# Patient Record
Sex: FEMALE | Race: WHITE | NOT HISPANIC OR LATINO | Employment: PART TIME | URBAN - METROPOLITAN AREA
[De-identification: names, ages, dates, MRNs, and addresses within clinical notes are randomized per-mention and may not be internally consistent; named-entity substitution may affect disease eponyms.]

---

## 2017-01-24 DIAGNOSIS — K51.919 ULCERATIVE COLITIS WITH COMPLICATION, UNSPECIFIED LOCATION (H): ICD-10-CM

## 2017-01-24 DIAGNOSIS — K51.90 ULCERATIVE COLITIS (H): Primary | ICD-10-CM

## 2017-01-24 NOTE — TELEPHONE ENCOUNTER
sulfaSALAzine ER (AZULFIDINE EN) 500 MG EC tablet      Last Written Prescription Date: 4/13/2016  Last Fill Quantity: 240,  # refills: 4   Last Office Visit with [facility], [facility] or [facility] prescribing provider: 4/15/2016

## 2017-01-25 RX ORDER — SULFASALAZINE 500 MG/1
TABLET, DELAYED RELEASE ORAL
Qty: 240 TABLET | Refills: 3 | Status: SHIPPED | OUTPATIENT
Start: 2017-01-25 | End: 2017-09-02

## 2017-01-25 NOTE — TELEPHONE ENCOUNTER
BK  Routing refill request to provider for review/approval because:  Drug not on the [facility] refill protocol   Please approve if appropriate  Juhi Gonzalez RN

## 2017-02-01 ENCOUNTER — TELEPHONE (OUTPATIENT)
Dept: FAMILY MEDICINE | Facility: CLINIC | Age: 57
End: 2017-02-01

## 2017-04-17 DIAGNOSIS — G89.29 CHRONIC BACK PAIN: ICD-10-CM

## 2017-04-17 DIAGNOSIS — M54.9 CHRONIC BACK PAIN: ICD-10-CM

## 2017-04-18 NOTE — TELEPHONE ENCOUNTER
Left message for patient to call and schedule        cyclobenzaprine (FLEXERIL) 10 MG tablet        Last Written Prescription Date: 4/15/2016  Last Fill Quantity: 30,  # refills: 5   Last Office Visit with [facility], [facility] or [facility] prescribing provider: 4/15/2016

## 2017-04-20 RX ORDER — CYCLOBENZAPRINE HCL 10 MG
TABLET ORAL
Qty: 30 TABLET | Refills: 0 | OUTPATIENT
Start: 2017-04-20

## 2017-06-02 ENCOUNTER — TRANSFERRED RECORDS (OUTPATIENT)
Dept: HEALTH INFORMATION MANAGEMENT | Facility: CLINIC | Age: 57
End: 2017-06-02

## 2017-06-07 ENCOUNTER — TRANSFERRED RECORDS (OUTPATIENT)
Dept: HEALTH INFORMATION MANAGEMENT | Facility: CLINIC | Age: 57
End: 2017-06-07

## 2017-06-13 ENCOUNTER — TRANSFERRED RECORDS (OUTPATIENT)
Dept: HEALTH INFORMATION MANAGEMENT | Facility: CLINIC | Age: 57
End: 2017-06-13

## 2017-06-16 ENCOUNTER — HOSPITAL ENCOUNTER (EMERGENCY)
Facility: CLINIC | Age: 57
Discharge: HOME | End: 2017-06-16
Attending: EMERGENCY MEDICINE | Admitting: EMERGENCY MEDICINE
Payer: COMMERCIAL

## 2017-06-16 VITALS
OXYGEN SATURATION: 98 % | TEMPERATURE: 98.3 F | HEART RATE: 103 BPM | DIASTOLIC BLOOD PRESSURE: 73 MMHG | HEIGHT: 61 IN | RESPIRATION RATE: 20 BRPM | SYSTOLIC BLOOD PRESSURE: 122 MMHG

## 2017-06-16 DIAGNOSIS — R55 NEAR SYNCOPE: ICD-10-CM

## 2017-06-16 DIAGNOSIS — E86.0 DEHYDRATION: ICD-10-CM

## 2017-06-16 LAB
ALBUMIN UR-MCNC: NEGATIVE MG/DL
ANION GAP SERPL CALCULATED.3IONS-SCNC: 12 MMOL/L (ref 3–14)
APPEARANCE UR: CLEAR
BASOPHILS # BLD AUTO: 0 10E9/L (ref 0–0.2)
BASOPHILS NFR BLD AUTO: 0.2 %
BILIRUB UR QL STRIP: NEGATIVE
BUN SERPL-MCNC: 10 MG/DL (ref 7–30)
CALCIUM SERPL-MCNC: 7.8 MG/DL (ref 8.5–10.1)
CHLORIDE SERPL-SCNC: 107 MMOL/L (ref 94–109)
CO2 SERPL-SCNC: 19 MMOL/L (ref 20–32)
COLOR UR AUTO: YELLOW
CREAT SERPL-MCNC: 0.81 MG/DL (ref 0.52–1.04)
DIFFERENTIAL METHOD BLD: ABNORMAL
EOSINOPHIL # BLD AUTO: 0.1 10E9/L (ref 0–0.7)
EOSINOPHIL NFR BLD AUTO: 0.9 %
ERYTHROCYTE [DISTWIDTH] IN BLOOD BY AUTOMATED COUNT: 14.1 % (ref 10–15)
ETHANOL SERPL-MCNC: 0.04 G/DL
GFR SERPL CREATININE-BSD FRML MDRD: 73 ML/MIN/1.7M2
GLUCOSE SERPL-MCNC: 110 MG/DL (ref 70–99)
GLUCOSE UR STRIP-MCNC: NEGATIVE MG/DL
HCT VFR BLD AUTO: 35.8 % (ref 35–47)
HGB BLD-MCNC: 12.1 G/DL (ref 11.7–15.7)
HGB UR QL STRIP: NEGATIVE
IMM GRANULOCYTES # BLD: 0.1 10E9/L (ref 0–0.4)
IMM GRANULOCYTES NFR BLD: 0.5 %
KETONES UR STRIP-MCNC: NEGATIVE MG/DL
LEUKOCYTE ESTERASE UR QL STRIP: NEGATIVE
LYMPHOCYTES # BLD AUTO: 1.5 10E9/L (ref 0.8–5.3)
LYMPHOCYTES NFR BLD AUTO: 13.4 %
MCH RBC QN AUTO: 33.3 PG (ref 26.5–33)
MCHC RBC AUTO-ENTMCNC: 33.8 G/DL (ref 31.5–36.5)
MCV RBC AUTO: 99 FL (ref 78–100)
MONOCYTES # BLD AUTO: 0.6 10E9/L (ref 0–1.3)
MONOCYTES NFR BLD AUTO: 5.7 %
NEUTROPHILS # BLD AUTO: 8.7 10E9/L (ref 1.6–8.3)
NEUTROPHILS NFR BLD AUTO: 79.3 %
NITRATE UR QL: NEGATIVE
NRBC # BLD AUTO: 0 10*3/UL
NRBC BLD AUTO-RTO: 0 /100
PH UR STRIP: 5 PH (ref 5–7)
PLATELET # BLD AUTO: 303 10E9/L (ref 150–450)
POTASSIUM SERPL-SCNC: 3.4 MMOL/L (ref 3.4–5.3)
RBC # BLD AUTO: 3.63 10E12/L (ref 3.8–5.2)
SODIUM SERPL-SCNC: 138 MMOL/L (ref 133–144)
SP GR UR STRIP: 1.01 (ref 1–1.03)
URN SPEC COLLECT METH UR: NORMAL
UROBILINOGEN UR STRIP-ACNC: 0.2 EU/DL (ref 0.2–1)
WBC # BLD AUTO: 10.9 10E9/L (ref 4–11)

## 2017-06-16 PROCEDURE — 93005 ELECTROCARDIOGRAM TRACING: CPT

## 2017-06-16 PROCEDURE — 99284 EMERGENCY DEPT VISIT MOD MDM: CPT | Mod: 25

## 2017-06-16 PROCEDURE — 80048 BASIC METABOLIC PNL TOTAL CA: CPT | Performed by: EMERGENCY MEDICINE

## 2017-06-16 PROCEDURE — 80320 DRUG SCREEN QUANTALCOHOLS: CPT | Performed by: EMERGENCY MEDICINE

## 2017-06-16 PROCEDURE — 81003 URINALYSIS AUTO W/O SCOPE: CPT | Performed by: EMERGENCY MEDICINE

## 2017-06-16 PROCEDURE — 85025 COMPLETE CBC W/AUTO DIFF WBC: CPT | Performed by: EMERGENCY MEDICINE

## 2017-06-16 PROCEDURE — 96360 HYDRATION IV INFUSION INIT: CPT

## 2017-06-16 PROCEDURE — 25000128 H RX IP 250 OP 636: Performed by: EMERGENCY MEDICINE

## 2017-06-16 PROCEDURE — 96361 HYDRATE IV INFUSION ADD-ON: CPT

## 2017-06-16 RX ORDER — SODIUM CHLORIDE 9 MG/ML
1000 INJECTION, SOLUTION INTRAVENOUS CONTINUOUS
Status: DISCONTINUED | OUTPATIENT
Start: 2017-06-16 | End: 2017-06-16 | Stop reason: HOSPADM

## 2017-06-16 RX ADMIN — SODIUM CHLORIDE 1000 ML: 9 INJECTION, SOLUTION INTRAVENOUS at 17:57

## 2017-06-16 RX ADMIN — SODIUM CHLORIDE 1000 ML: 9 INJECTION, SOLUTION INTRAVENOUS at 16:47

## 2017-06-16 NOTE — ED AVS SNAPSHOT
Emergency Department    [address] [place] [address]    Phone:  [phone]    Fax:  [phone]                                       John Steven   MRN: [id number]    Department:   Emergency Department   Date of Visit:  6/16/2017           After Visit Summary Signature Page     I have received my discharge instructions, and my questions have been answered. I have discussed any challenges I see with this plan with the nurse or doctor.    ..........................................................................................................................................  Patient/Patient Representative Signature      ..........................................................................................................................................  Patient Representative Print Name and Relationship to Patient    ..................................................               ................................................  Date                                            Time    ..........................................................................................................................................  Reviewed by Signature/Title    ...................................................              ..............................................  Date                                                            Time

## 2017-06-16 NOTE — ED NOTES
Bed: ED28  Expected date:   Expected time:   Means of arrival:   Comments:  Tawana 414 low BP 57 female

## 2017-06-16 NOTE — ED AVS SNAPSHOT
Emergency Department    [address] [facility] [address]    Phone:  [phone]    Fax:  [phone]                                       John Steven   MRN: [id number]    Department:   Emergency Department   Date of Visit:  6/16/2017           Patient Information     Date Of Birth          1960        Your diagnoses for this visit were:     Near syncope     Dehydration        You were seen by Trierweiler, Chad A, MD.      Follow-up Information     Follow up with Rianna Sim MD In 1 week.    Specialty:  Internal Medicine    Contact information:    [facility]  [address] [facility] [address]  [facility] [id number] [phone]          Follow up with  Emergency Department.    Specialty:  EMERGENCY MEDICINE    Why:  If symptoms worsen    Contact information:    [address] [facility] [address] [phone]        Discharge Instructions       Discharge Instructions  Syncope    Syncope (fainting) is a sudden, short loss of consciousness (passing out spell). People will usually fall to the ground when they faint or slump over if seated.  At this time your doctor does not find that your fainting spell is a sign of anything dangerous or life-threatening.  However, sometimes the signs of serious illness do not show up right away.  If you have new or worse symptoms, you may need to be seen again in the Emergency Department or by your primary doctor. Be sure to follow up as directed, or at least within 1 week.      Return to the Emergency Department if:    You faint again.     You have any significant bleeding.    You have chest pain or fast heartbeat, or if your heartbeat is irregular or skipping beats.    You feel short of breath.    You cough up any blood.    You have belly (abdominal) pain or unusual back pain.    You have ongoing vomiting (throwing up) or diarrhea, or have a black or tarry bowel movement or blood in the bowels or blood in your vomit.    You have a  fever over 101 degrees.    You lose feeling or cannot move a part of your body or cannot talk normally.    You are confused, have a headache, cannot see well, or have a seizure.    DO NOT DRIVE. CALL 911 INSTEAD!    What can I do to help myself?    Follow any specific instructions that your provider discussed with you.    If you feel light-headed, make sure to sit down right away, even if you have to sit on the floor.    Follow up with your regular medical doctor as discussed for further management. This may include lowering your blood pressure medications, insulin or other diabetic medications, checking your blood sugar more frequently, and drinking more fluids, taking medicines for vomiting or diarrhea or getting up slower.  If you were given a prescription for medicine here today, be sure to read all of the information (including the package insert) that comes with your prescription.  This will include important information about the medicine, its side effects, and any warnings that you need to know about.  The pharmacist who fills the prescription can provide more information and answer questions you may have about the medicine.  If you have questions or concerns that the pharmacist cannot address, please call or return to the Emergency Department.     Future Appointments        Provider Department Dept Phone Center    6/23/2017 10:00 AM Rianna Sim MD [facility] [phone]       24 Hour Appointment Hotline       To make an appointment at any [facility], call [phone] ([phone]). If you don't have a family doctor or clinic, we will help you find one. [facility] (formerly [facility]) are conveniently located to serve the needs of you and your family.             Review of your medicines      Our records show that you are taking the medicines listed below. If these are incorrect, please call your family doctor or clinic.        Dose / Directions Last dose taken    acyclovir 5 % cream   Commonly  known as:  ZOVIRAX   Quantity:  5 g        Apply topically 5 times daily Apply to cold sores/fever blisters   Refills:  3        albuterol 108 (90 BASE) MCG/ACT Inhaler   Commonly known as:  PROAIR HFA/PROVENTIL HFA/VENTOLIN HFA   Dose:  2 puff        Inhale 2 puffs into the lungs every 6 hours as needed for shortness of breath / dyspnea or wheezing   Refills:  0        ALPRAZolam 0.25 MG tablet   Commonly known as:  XANAX   Quantity:  45 tablet        TAKE 2 TABLETS BY MOUTH THREE TIMES DAILY AS NEEDED FOR ANXIETY   Refills:  0        cetirizine 10 MG tablet   Commonly known as:  zyrTEC   Dose:  10 mg        Take 10 mg by mouth daily   Refills:  0        clidinium-chlordiazePOXIDE 5-2.5 MG Caps per capsule   Commonly known as:  LIBRAX   Quantity:  30 capsule        TAKE ONE CAPSULE BY MOUTH DAILY AS NEEDED   Refills:  12        cyclobenzaprine 10 MG tablet   Commonly known as:  FLEXERIL   Dose:  5-10 mg   Quantity:  30 tablet        Take 0.5-1 tablets (5-10 mg) by mouth every 8 hours as needed for muscle spasms   Refills:  5        diphenhydrAMINE 25 MG capsule   Commonly known as:  BENADRYL   Dose:  25 mg        Take 1 capsule by mouth nightly as needed for itching. Post nasal drip causing esophageal spasms   Refills:  0        folic acid 1 MG tablet   Commonly known as:  FOLVITE   Quantity:  90 tablet        TAKE 1 TABLET BY MOUTH EVERY DAY.   Refills:  3        LEVSIN/SL 0.125 MG sublingual tablet   Quantity:  30   Generic drug:  hyoscyamine        1 TAB 4 TIMES DAILY, BEFORE MEALS AND AT BEDTIME   Refills:  12        MACRODANTIN 100 MG capsule   Generic drug:  nitrofurantoin macrocrystal        1 CAPSULE AFTER SEX   Refills:  0        metroNIDAZOLE 1 % gel   Commonly known as:  METROGEL   Quantity:  30 g        Apply  topically 2 times daily.   Refills:  12        NIFEdipine ER osmotic 30 MG 24 hr tablet   Commonly known as:  NIFEDICAL XL   Dose:  30 mg   Quantity:  90 tablet        Take 1 tablet (30 mg) by  mouth daily   Refills:  3        Norethin-Eth Estrad-Fe Biphas 1 MG-10 MCG / 10 MCG Tabs   Dose:  1 tablet        Take 1 tablet by mouth daily Lo Loestrin   Refills:  0        * order for DME   Quantity:  1 each        Equipment being ordered:  Compression pump Flexitouch   Refills:  0        * order for DME   Quantity:  2 each        Equipment being ordered: Compression stockings 20-30 mm pressure   Refills:  3        psyllium 0.52 G capsule   Dose:  1 capsule        Take 1 capsule by mouth daily   Refills:  0        sulfaSALAzine  MG EC tablet   Commonly known as:  AZULFIDINE EN   Quantity:  240 tablet        TAKE 4 TABLETS BY MOUTH TWICE DAILY   Refills:  3        * Notice:  This list has 2 medication(s) that are the same as other medications prescribed for you. Read the directions carefully, and ask your doctor or other care provider to review them with you.            Procedures and tests performed during your visit     *UA reflex to Microscopic    Alcohol level blood    Basic metabolic panel    CBC with platelets differential    EKG 12 lead    Peripheral IV catheter      Orders Needing Specimen Collection     None      Pending Results     No orders found from 6/14/2017 to 6/17/2017.            Pending Culture Results     No orders found from 6/14/2017 to 6/17/2017.            Pending Results Instructions     If you had any lab results that were not finalized at the time of your Discharge, you can call the ED Lab Result RN at [phone]. You will be contacted by this team for any positive Lab results or changes in treatment. The nurses are available 7 days a week from 10A to 6:30P.  You can leave a message 24 hours per day and they will return your call.        Test Results From Your Hospital Stay        6/16/2017  4:57 PM      Component Results     Component Value Ref Range & Units Status    WBC 10.9 4.0 - 11.0 10e9/L Final    RBC Count 3.63 (L) 3.8 - 5.2 10e12/L Final    Hemoglobin 12.1 11.7 - 15.7  g/dL Final    Hematocrit 35.8 35.0 - 47.0 % Final    MCV 99 78 - 100 fl Final    MCH 33.3 (H) 26.5 - 33.0 pg Final    MCHC 33.8 31.5 - 36.5 g/dL Final    RDW 14.1 10.0 - 15.0 % Final    Platelet Count 303 150 - 450 10e9/L Final    Diff Method Automated Method  Final    % Neutrophils 79.3 % Final    % Lymphocytes 13.4 % Final    % Monocytes 5.7 % Final    % Eosinophils 0.9 % Final    % Basophils 0.2 % Final    % Immature Granulocytes 0.5 % Final    Nucleated RBCs 0 0 /100 Final    Absolute Neutrophil 8.7 (H) 1.6 - 8.3 10e9/L Final    Absolute Lymphocytes 1.5 0.8 - 5.3 10e9/L Final    Absolute Monocytes 0.6 0.0 - 1.3 10e9/L Final    Absolute Eosinophils 0.1 0.0 - 0.7 10e9/L Final    Absolute Basophils 0.0 0.0 - 0.2 10e9/L Final    Abs Immature Granulocytes 0.1 0 - 0.4 10e9/L Final    Absolute Nucleated RBC 0.0  Final         6/16/2017  5:10 PM      Component Results     Component Value Ref Range & Units Status    Sodium 138 133 - 144 mmol/L Final    Potassium 3.4 3.4 - 5.3 mmol/L Final    Chloride 107 94 - 109 mmol/L Final    Carbon Dioxide 19 (L) 20 - 32 mmol/L Final    Anion Gap 12 3 - 14 mmol/L Final    Glucose 110 (H) 70 - 99 mg/dL Final    Urea Nitrogen 10 7 - 30 mg/dL Final    Creatinine 0.81 0.52 - 1.04 mg/dL Final    GFR Estimate 73 >60 mL/min/1.7m2 Final    Non  GFR Calc    GFR Estimate If Black 89 >60 mL/min/1.7m2 Final    African American GFR Calc    Calcium 7.8 (L) 8.5 - 10.1 mg/dL Final         6/16/2017  5:10 PM      Component Results     Component Value Ref Range & Units Status    Ethanol g/dL 0.04 (H) <0.01 g/dL Final         6/16/2017  5:38 PM      Component Results     Component Value Ref Range & Units Status    Color Urine Yellow  Final    Appearance Urine Clear  Final    Glucose Urine Negative NEG mg/dL Final    Bilirubin Urine Negative NEG Final    Ketones Urine Negative NEG mg/dL Final    Specific Gravity Urine 1.010 1.003 - 1.035 Final    Blood Urine Negative NEG Final    pH  Urine 5.0 5.0 - 7.0 pH Final    Protein Albumin Urine Negative NEG mg/dL Final    Urobilinogen Urine 0.2 0.2 - 1.0 EU/dL Final    Nitrite Urine Negative NEG Final    Leukocyte Esterase Urine Negative NEG Final    Source Midstream Urine  Final                Clinical Quality Measure: Blood Pressure Screening     Your blood pressure was checked while you were in the emergency department today. The last reading we obtained was  BP: 120/63 . Please read the guidelines below about what these numbers mean and what you should do about them.  If your systolic blood pressure (the top number) is less than 120 and your diastolic blood pressure (the bottom number) is less than 80, then your blood pressure is normal. There is nothing more that you need to do about it.  If your systolic blood pressure (the top number) is 120-139 or your diastolic blood pressure (the bottom number) is 80-89, your blood pressure may be higher than it should be. You should have your blood pressure rechecked within a year by a primary care provider.  If your systolic blood pressure (the top number) is 140 or greater or your diastolic blood pressure (the bottom number) is 90 or greater, you may have high blood pressure. High blood pressure is treatable, but if left untreated over time it can put you at risk for heart attack, stroke, or kidney failure. You should have your blood pressure rechecked by a primary care provider within the next 4 weeks.  If your provider in the emergency department today gave you specific instructions to follow-up with your doctor or provider even sooner than that, you should follow that instruction and not wait for up to 4 weeks for your follow-up visit.        Thank you for choosing [facility]       Thank you for choosing [facility] for your care. Our goal is always to provide you with excellent care. Hearing back from our patients is one way we can continue to improve our services. Please take a few minutes to complete the  "written survey that you may receive in the mail after you visit with us. Thank you!        [employer] Information     [employer] lets you send messages to your doctor, view your test results, renew your prescriptions, schedule appointments and more. To sign up, go to [email or web address] . Click on \"Log in\" on the left side of the screen, which will take you to the Welcome page. Then click on \"Sign up Now\" on the right side of the page.     You will be asked to enter the access code listed below, as well as some personal information. Please follow the directions to create your username and password.     Your access code is: [email or web address]  Expires: 2017  7:08 PM     Your access code will  in 90 days. If you need help or a new code, please call your [facility] clinic or [phone].        Care EveryWhere ID     This is your Care EveryWhere ID. This could be used by other organizations to access your [facility] medical records  [id number]        After Visit Summary       This is your record. Keep this with you and show to your community pharmacist(s) and doctor(s) at your next visit.                  "

## 2017-06-17 LAB — INTERPRETATION ECG - MUSE: NORMAL

## 2017-06-17 NOTE — DISCHARGE INSTRUCTIONS
Discharge Instructions  Syncope    Syncope (fainting) is a sudden, short loss of consciousness (passing out spell). People will usually fall to the ground when they faint or slump over if seated.  At this time your doctor does not find that your fainting spell is a sign of anything dangerous or life-threatening.  However, sometimes the signs of serious illness do not show up right away.  If you have new or worse symptoms, you may need to be seen again in the Emergency Department or by your primary doctor. Be sure to follow up as directed, or at least within 1 week.      Return to the Emergency Department if:    You faint again.     You have any significant bleeding.    You have chest pain or fast heartbeat, or if your heartbeat is irregular or skipping beats.    You feel short of breath.    You cough up any blood.    You have belly (abdominal) pain or unusual back pain.    You have ongoing vomiting (throwing up) or diarrhea, or have a black or tarry bowel movement or blood in the bowels or blood in your vomit.    You have a fever over 101 degrees.    You lose feeling or cannot move a part of your body or cannot talk normally.    You are confused, have a headache, cannot see well, or have a seizure.    DO NOT DRIVE. CALL 911 INSTEAD!    What can I do to help myself?    Follow any specific instructions that your provider discussed with you.    If you feel light-headed, make sure to sit down right away, even if you have to sit on the floor.    Follow up with your regular medical doctor as discussed for further management. This may include lowering your blood pressure medications, insulin or other diabetic medications, checking your blood sugar more frequently, and drinking more fluids, taking medicines for vomiting or diarrhea or getting up slower.  If you were given a prescription for medicine here today, be sure to read all of the information (including the package insert) that comes with your prescription.  This  will include important information about the medicine, its side effects, and any warnings that you need to know about.  The pharmacist who fills the prescription can provide more information and answer questions you may have about the medicine.  If you have questions or concerns that the pharmacist cannot address, please call or return to the Emergency Department.

## 2017-06-18 NOTE — ED PROVIDER NOTES
"CHIEF COMPLAINT:  \"I'm dehydrated.\"      HISTORY OF PRESENT ILLNESS:  The patient John Steven is a 57-year-old woman presenting to the Emergency Department from a local bar where the staff felt she was dehydrated.  The patient states that she went to the [employer] game last night and drank alcohol.  She felt very tired when she got home and simply went straight to bed without eating or drinking anything.  She awoke this morning and admits to not drinking anything other than a cup of decaf coffee.  She ate nothing throughout the day.  She then went out to a local bar for drinks with her friends.  She was sitting outside in the sun drinking alcohol when she noted that she became very lightheaded.  She felt overly hot and sweaty.  Her friends state that she looked \"gray.\"  They also felt that she seemed to be somewhat confused when they asked her questions.  After they brought her inside and she cooled down she seemed to improve, but when she started to wax and wane again they called EMS who found her to have a slightly low blood pressure of 102/62 with a heart rate of 110 on the scene.  The patient denies chest pain, headache or other complaints at this juncture.  She notes that she simply felt overly hot and wonders if she is dehydrated.  There has been no vomiting or diarrhea.  She denies palpitations or chest pain.      PAST MEDICAL HISTORY:     1.  Ulcerative colitis.   2.  Anxiety.      MEDICATIONS:     1.  Sulfasalazine.   2.  Xanax.   3.  Folic acid.      ALLERGIES:     1.  Fentanyl.   2.  Morphine.   3.  Penicillin.   4.   Shellfish.     5.  She states that she is \"allergic to alcohol.\"      SOCIAL HISTORY:  The patient is a former smoker.  She denies drinking alcohol regularly.      REVIEW OF SYSTEMS:  Pertinent positives and negatives as above, all other systems reviewed and negative.      PHYSICAL EXAMINATION:   VITAL SIGNS:  Blood pressure 123/77, heart rate 103, respiratory rate 18, temperature 98.3 orally, " satting 97% on room air.   GENERAL:  The patient is a tired-appearing 57-year-old woman who is resting comfortably in the bed.   HEENT:  Pupils are equal, round, reactive to light with extraocular movements intact, no rhinorrhea, moist mucous membranes.   CARDIOVASCULAR:  Regular rate and rhythm, no murmurs, gallops or rubs.   RESPIRATORY:  Lungs clear to auscultation bilaterally without wheezes, rales or rhonchi.   GASTROINTESTINAL:  Positive bowel sounds, abdomen soft, nontender, nondistended.   MUSCULOSKELETAL:  Full range of motion of all extremities without difficulty.   SKIN:  Warm and dry without rash.   NEUROLOGIC:  Nonfocal examination.   PSYCHIATRIC:  Normal affect.      LABORATORY DATA/IMAGIN.  Unremarkable complete blood count.   2.  Unremarkable basic metabolic profile.     3.  Alcohol level 0.04.     4.  Urine clear.   5.  EKG shows a normal sinus rhythm with a rate of 98, normal axis and intervals, no ST elevation, depression or T-wave inversion concerning for ischemia.  This EKG is unchanged from a study    in 2015.  This is a normal EKG.      EMERGENCY DEPARTMENT COURSE AND MEDICAL DECISION MAKING:  The patient had an IV established, and labs were obtained.  Nursing notes were reviewed and agreed with.  The patient received 2 liters of normal saline while in the Emergency Department.      The patient presents to us with near-syncope in the setting of sitting in the sun and dehydration.  She has had nothing to eat or drink all day long, then sat in the sun drinking alcohol.  All of this points toward dehydration and a drop in her blood pressure which was found by Paramedics.  She rebounded very nicely with interventions here.  She was observed over a period of 2 hours during which time she felt completely improved and was comfortable with the plan for discharge home.  With her normal labs and improved vital signs I do feel this is reasonable.  I do not feel this represents an acute  cardiac cause of syncope or any other intracranial or neurologic event.  She was advised to watch her oral intake and to increase this appropriately.  She was otherwise invited back to our facility at any time for worsening of her condition or other emergent concerns.      DIAGNOSES:   1.  Near-syncope.   2.  Dehydration.         CHAD A. TRIERWEILER, MD             D: 2017 00:53   T: 2017 10:06   MT: HANNAH#[id number]      Name:     DELIA VALERO   MRN:      [id number]        Account:      [id number]   :      1960           Visit Date:   2017      Document: [id number]       cc: Rianna Sim MD

## 2017-06-23 ENCOUNTER — TRANSFERRED RECORDS (OUTPATIENT)
Dept: HEALTH INFORMATION MANAGEMENT | Facility: CLINIC | Age: 57
End: 2017-06-23

## 2017-06-23 ENCOUNTER — OFFICE VISIT (OUTPATIENT)
Dept: FAMILY MEDICINE | Facility: CLINIC | Age: 57
End: 2017-06-23
Payer: COMMERCIAL

## 2017-06-23 VITALS
BODY MASS INDEX: 34.55 KG/M2 | HEART RATE: 68 BPM | DIASTOLIC BLOOD PRESSURE: 60 MMHG | RESPIRATION RATE: 18 BRPM | HEIGHT: 61 IN | SYSTOLIC BLOOD PRESSURE: 100 MMHG | WEIGHT: 183 LBS | OXYGEN SATURATION: 98 % | TEMPERATURE: 97 F

## 2017-06-23 DIAGNOSIS — E66.9 NON MORBID OBESITY, UNSPECIFIED OBESITY TYPE: ICD-10-CM

## 2017-06-23 DIAGNOSIS — Z13.6 CARDIOVASCULAR SCREENING; LDL GOAL LESS THAN 160: ICD-10-CM

## 2017-06-23 DIAGNOSIS — K22.9 DISORDER OF ESOPHAGUS: ICD-10-CM

## 2017-06-23 DIAGNOSIS — Z23 NEED FOR VACCINATION: ICD-10-CM

## 2017-06-23 DIAGNOSIS — F43.9 STRESS: ICD-10-CM

## 2017-06-23 DIAGNOSIS — R42 DIZZINESS AND GIDDINESS: ICD-10-CM

## 2017-06-23 DIAGNOSIS — K51.919 ULCERATIVE COLITIS WITH COMPLICATION, UNSPECIFIED LOCATION (H): Primary | ICD-10-CM

## 2017-06-23 PROCEDURE — 99214 OFFICE O/P EST MOD 30 MIN: CPT | Performed by: INTERNAL MEDICINE

## 2017-06-23 PROCEDURE — 90471 IMMUNIZATION ADMIN: CPT | Performed by: INTERNAL MEDICINE

## 2017-06-23 PROCEDURE — 90732 PPSV23 VACC 2 YRS+ SUBQ/IM: CPT | Performed by: INTERNAL MEDICINE

## 2017-06-23 RX ORDER — MESALAMINE 1000 MG/1
SUPPOSITORY RECTAL
Refills: 10 | COMMUNITY
Start: 2017-06-15 | End: 2019-08-09

## 2017-06-23 RX ORDER — HYDROCORTISONE 100 MG/60ML
SUSPENSION RECTAL
Refills: 1 | COMMUNITY
Start: 2017-05-19 | End: 2019-08-09

## 2017-06-23 RX ORDER — NORETHINDRONE ACETATE AND ETHINYL ESTRADIOL, ETHINYL ESTRADIOL AND FERROUS FUMARATE 1MG-10(24)
KIT ORAL
Refills: 3 | COMMUNITY
Start: 2017-06-04 | End: 2018-08-10

## 2017-06-23 RX ORDER — HYDROCORTISONE ACETATE 25 MG
SUPPOSITORY, RECTAL RECTAL
Refills: 1 | COMMUNITY
Start: 2017-06-15 | End: 2019-08-09

## 2017-06-23 RX ORDER — ALPRAZOLAM 0.25 MG
TABLET ORAL
Qty: 45 TABLET | Refills: 1 | Status: SHIPPED | OUTPATIENT
Start: 2017-06-23 | End: 2018-08-10

## 2017-06-23 NOTE — NURSING NOTE
"Chief Complaint   Patient presents with     Physical       Initial /60 (BP Location: Left arm, Patient Position: Chair, Cuff Size: Adult Large)  Pulse 68  Temp 97  F (36.1  C) (Oral)  Resp 18  Ht 5' 1.25\" (1.556 m)  Wt 183 lb (83 kg)  SpO2 98%  Breastfeeding? No  BMI 34.3 kg/m2 Estimated body mass index is 34.3 kg/(m^2) as calculated from the following:    Height as of this encounter: 5' 1.25\" (1.556 m).    Weight as of this encounter: 183 lb (83 kg).  Medication Reconciliation: complete   Briana Rodas CMA (AAMA)      "

## 2017-06-23 NOTE — NURSING NOTE
Screening Questionnaire for Adult Immunization    Are you sick today?   No   Do you have allergies to medications, food, a vaccine component or latex?   No   Have you ever had a serious reaction after receiving a vaccination?   No   Do you have a long-term health problem with heart disease, lung disease, asthma, kidney disease, metabolic disease (e.g. diabetes), anemia, or other blood disorder?   No   Do you have cancer, leukemia, HIV/AIDS, or any other immune system problem?   No   In the past 3 months, have you taken medications that affect  your immune system, such as prednisone, other steroids, or anticancer drugs; drugs for the treatment of rheumatoid arthritis, Crohn s disease, or psoriasis; or have you had radiation treatments?   No   Have you had a seizure, or a brain or other nervous system problem?   No   During the past year, have you received a transfusion of blood or blood     products, or been given immune (gamma) globulin or antiviral drug?   No   For women: Are you pregnant or is there a chance you could become        pregnant during the next month?   No   Have you received any vaccinations in the past 4 weeks?   No     Immunization questionnaire answers were all negative.      MNVFC doesn't apply on this patient    Per orders of Dr. Sim, injection of Pneumovax 23 given by Briana Rodas. Patient instructed to remain in clinic for 20 minutes afterwards, and to report any adverse reaction to me immediately.       Screening performed by Briana Rodas on 6/23/2017 at 10:57 AM.

## 2017-06-23 NOTE — PROGRESS NOTES
SUBJECTIVE:                                                    John Steven is a 57 year old female who presents to clinic today for the following health issues:   SUBJECTIVE:     CC: John Steven is an 57 year old woman who presents for preventive health visit.     Healthy Habits:    Do you get at least three servings of calcium containing foods daily (dairy, green leafy vegetables, etc.)? no, taking calcium and/or vitamin D supplement: no    Amount of exercise or daily activities, outside of work: walking only    Problems taking medications regularly No    Medication side effects: No    Have you had an eye exam in the past two years? yes    Do you see a dentist twice per year? yes    Do you have sleep apnea, excessive snoring or daytime drowsiness?no    Patient is seeing GI for colitis and GERD issues  She sees Dr Messina today    She was on Enterocort and now done with it  It did not help  She also has been on OCP due to issues with Colitis  Her gyn manages that  GI issues are stable    She added extra BCP to help with colitis  No blood in stools with that  She is on Rectal suppository  She has bleeding , urgency, diarrhea with flare up  She feels very stressed         Problem list and histories reviewed & adjusted, as indicated.  Additional history: as documented    Patient Active Problem List   Diagnosis     Other specified disorder of rectum and anus     Ulcerative colitis (H)     Other urethritis(187.89)     Other and unspecified hormones and synthetic substitutes causing adverse effect in therapeutic use     Allergic rhinitis     Disorder of esophagus     Rosacea     Dizziness and giddiness     CARDIOVASCULAR SCREENING; LDL GOAL LESS THAN 160     Obesity     Perimenopausal     Past Surgical History:   Procedure Laterality Date     BIOPSY      BREAST     C NONSPECIFIC PROCEDURE  2003    Heller myotomy     C NONSPECIFIC PROCEDURE      EB for spotting,fibroid     C NONSPECIFIC PROCEDURE  2007    rt breast biopsy      DILATION AND CURETTAGE, HYSTEROSCOPY DIAGNOSTIC, COMBINED N/A 8/24/2015    Procedure: COMBINED DILATION AND CURETTAGE, HYSTEROSCOPY DIAGNOSTIC;  Surgeon: Chelle Rebollar MD;  Location: [facility]     GI SURGERY       GYN SURGERY       HC COLONOSCOPY THRU STOMA, DIAGNOSTIC  4/06       Social History   Substance Use Topics     Smoking status: Former Smoker     Packs/day: 1.00     Years: 10.00     Types: Cigarettes     Quit date: 1/1/1989     Smokeless tobacco: Never Used     Alcohol use 0.0 oz/week     0 Standard drinks or equivalent per week      Comment: 2-3 drinks  times a week     Family History   Problem Relation Age of Onset     CEREBROVASCULAR DISEASE Mother      80     DIABETES Mother      Breast Cancer Mother      60     Blood Disease Mother      merkel cell     GASTROINTESTINAL DISEASE Brother      Collagenous colitis     Asthma Son      DIABETES Maternal Grandmother          Current Outpatient Prescriptions   Medication Sig Dispense Refill     ALPRAZolam (XANAX) 0.25 MG tablet TAKE 2 TABLETS BY MOUTH THREE TIMES DAILY AS NEEDED FOR ANXIETY 45 tablet 1     CANASA 1000 MG Suppository UNW AND I 1 SUP REC QD PRN  10     hydrocortisone (CORTENEMA) 100 MG/60ML enema PLACE 1 ENEMA  BY RECTAL ROUTE QD FOR 2 WEEKS HS.  1     ANUCORT-HC 25 MG Suppository UNW AND I 1 SUP REC  Q EVENING FOR 2 WEEKS  1     CORTIFOAM 10 % rectal foam JESSICA REC BID FOR 14 DAYS  0     LO LOESTRIN FE 1 MG-10 MCG / 10 MCG TABS TK ONE ACTIVE T PO ONCE D  3     sulfaSALAzine ER (AZULFIDINE EN) 500 MG EC tablet TAKE 4 TABLETS BY MOUTH TWICE DAILY 240 tablet 3     order for DME Equipment being ordered: Compression stockings  20-30 mm pressure 2 each 3     clidinium-chlordiazePOXIDE (LIBRAX) 5-2.5 MG CAPS TAKE ONE CAPSULE BY MOUTH DAILY AS NEEDED 30 capsule 12     folic acid (FOLVITE) 1 MG tablet TAKE 1 TABLET BY MOUTH EVERY DAY. 90 tablet 3     cyclobenzaprine (FLEXERIL) 10 MG tablet Take 0.5-1 tablets (5-10 mg) by mouth every 8 hours as  "needed for muscle spasms 30 tablet 5     order for DME Equipment being ordered:  Compression pump  Flexitouch 1 each 0     metroNIDAZOLE (METROGEL) 1 % gel Apply  topically 2 times daily. 30 g 12     psyllium 0.52 G capsule Take 1 capsule by mouth daily       cetirizine (ZYRTEC) 10 MG tablet Take 10 mg by mouth daily       albuterol (PROAIR HFA, PROVENTIL HFA, VENTOLIN HFA) 108 (90 BASE) MCG/ACT inhaler Inhale 2 puffs into the lungs every 6 hours as needed for shortness of breath / dyspnea or wheezing       NIFEdipine osmotic (NIFEDICAL XL) 30 MG 24 hr tablet Take 1 tablet (30 mg) by mouth daily 90 tablet 3     acyclovir (ZOVIRAX) 5 % cream Apply topically 5 times daily Apply to cold sores/fever blisters 5 g 3     diphenhydrAMINE (BENADRYL) 25 MG capsule Take 1 capsule by mouth nightly as needed for itching. Post nasal drip causing esophageal spasms       LEVSIN/SL 0.125 MG SL SUBL 1 TAB 4 TIMES DAILY, BEFORE MEALS AND AT BEDTIME 30 12     MACRODANTIN 100 MG OR CAPS 1 CAPSULE AFTER SEX         Reviewed and updated as needed this visit by clinical staff  Tobacco  Allergies  Meds  Problems  Med Hx  Surg Hx  Fam Hx  Soc Hx        Reviewed and updated as needed this visit by Provider  Allergies  Meds  Problems  Med Hx  Fam Hx         ROS:  Constitutional, HEENT, cardiovascular, pulmonary, GI, , musculoskeletal, neuro, skin, endocrine and psych systems are negative, except as otherwise noted.    OBJECTIVE:                                                    /60 (BP Location: Left arm, Patient Position: Chair, Cuff Size: Adult Large)  Pulse 68  Temp 97  F (36.1  C) (Oral)  Resp 18  Ht 5' 1.25\" (1.556 m)  Wt 183 lb (83 kg)  SpO2 98%  Breastfeeding? No  BMI 34.3 kg/m2  Body mass index is 34.3 kg/(m^2).  GENERAL: healthy, alert and no distress  EYES: Eyes grossly normal to inspection, PERRL and conjunctivae and sclerae normal  HENT: ear canals and TM's normal, nose and mouth without ulcers or " lesions  NECK: no adenopathy, no asymmetry, masses, or scars and thyroid normal to palpation  RESP: lungs clear to auscultation - no rales, rhonchi or wheezes  BREAST: normal without masses, tenderness or nipple discharge and no palpable axillary masses or adenopathy  CV: regular rate and rhythm, normal S1 S2, no S3 or S4, no murmur, click or rub, no peripheral edema and peripheral pulses strong  ABDOMEN: soft, nontender, no hepatosplenomegaly, no masses and bowel sounds normal  MS: no gross musculoskeletal defects noted, no edema  SKIN: no suspicious lesions or rashes  NEURO: Normal strength and tone, mentation intact and speech normal  PSYCH: mentation appears normal, affect normal/bright         ASSESSMENT/PLAN:                                                            1. Ulcerative colitis with complication, unspecified location (H)  Patient will start a biological agent and Pneumovax will be given today  Screening has been done by gastroenterology    - Hepatitis C antibody; Future  Risk of  infection and risk of skin cancer was discussed    2. Non morbid obesity, unspecified obesity type  Patient was on steroids and will start to lose weight    3. Disorder of esophagus  She will see her gastroenterologist today and is symptom-free    4. Dizziness and giddiness  Occasional dizziness could be from Low blood pressure as well and she will increase fluids  - ALPRAZolam (XANAX) 0.25 MG tablet; TAKE 2 TABLETS BY MOUTH THREE TIMES DAILY AS NEEDED FOR ANXIETY  Dispense: 45 tablet; Refill: 1    5. Stress    - ALPRAZolam (XANAX) 0.25 MG tablet; TAKE 2 TABLETS BY MOUTH THREE TIMES DAILY AS NEEDED FOR ANXIETY  Dispense: 45 tablet; Refill: 1    6. CARDIOVASCULAR SCREENING; LDL GOAL LESS THAN 160    - Lipid panel reflex to direct LDL; Future        Today's PHQ-2 Score:   PHQ-2 ( 1999 Pfizer) 6/23/2017 4/15/2016   Q1: Little interest or pleasure in doing things 0 0   Q2: Feeling down, depressed or hopeless 0 0   PHQ-2 Score 0  "0       Abuse: Current or Past(Physical, Sexual or Emotional)- No  Do you feel safe in your environment - Yes    Social History   Substance Use Topics     Smoking status: Former Smoker     Packs/day: 1.00     Years: 10.00     Types: Cigarettes     Quit date: 1/1/1989     Smokeless tobacco: Never Used     Alcohol use 0.0 oz/week     0 Standard drinks or equivalent per week      Comment: 2-3 drinks  times a week     The patient does not drink >3 drinks per day nor >7 drinks per week.    Recent Labs   Lab Test  04/11/12   0937  06/07/10   1123   CHOL  210*  203*   HDL  68  70   LDL  120  118   TRIG  110  76   CHOLHDLRATIO  3.1  2.9           Pertinent mammograms are reviewed under the imaging tab.  History of abnormal Pap smear: NO - age 30- 65 PAP every 3 years recommended    Reviewed and updated as needed this visit by clinical staff  Tobacco  Allergies  Meds  Problems  Med Hx  Surg Hx  Fam Hx  Soc Hx          Reviewed and updated as needed this visit by Provider  Allergies  Meds  Problems  Med Hx  Fam Hx      reports that she quit smoking about 28 years ago. Her smoking use included Cigarettes. She has a 10.00 pack-year smoking history. She has never used smokeless tobacco.    Estimated body mass index is 34.3 kg/(m^2) as calculated from the following:    Height as of this encounter: 5' 1.25\" (1.556 m).    Weight as of this encounter: 183 lb (83 kg).   Weight management plan: Discussed healthy diet and exercise guidelines and patient will follow up in 12 months in clinic to re-evaluate.    Counseling Resources:  ATP IV Guidelines  Pooled Cohorts Equation Calculator  Breast Cancer Risk Calculator  FRAX Risk Assessment  ICSI Preventive Guidelines  Dietary Guidelines for Americans, 2010  USDA's MyPlate  ASA Prophylaxis  Lung CA Screening    Rianna Sim MD  [facility]  "

## 2017-06-23 NOTE — MR AVS SNAPSHOT
"              After Visit Summary   6/23/2017    John Steven    MRN: [id number]           Patient Information     Date Of Birth          1960        Visit Information        Provider Department      6/23/2017 10:00 AM Rianna Sim MD [facility]        Today's Diagnoses     Ulcerative colitis with complication, unspecified location (H)    -  1    Non morbid obesity, unspecified obesity type        Disorder of esophagus        Dizziness and giddiness        Stress        CARDIOVASCULAR SCREENING; LDL GOAL LESS THAN 160           Follow-ups after your visit        Future tests that were ordered for you today     Open Future Orders        Priority Expected Expires Ordered    Hepatitis C antibody Routine 6/23/2017 6/23/2018 6/23/2017    Lipid panel reflex to direct LDL Routine 6/23/2017 6/20/2018 6/23/2017            Who to contact     If you have questions or need follow up information about today's clinic visit or your schedule please contact [facility] directly at [phone].  Normal or non-critical lab and imaging results will be communicated to you by MyChart, letter or phone within 4 business days after the clinic has received the results. If you do not hear from us within 7 days, please contact the clinic through [employer] or phone. If you have a critical or abnormal lab result, we will notify you by phone as soon as possible.  Submit refill requests through [employer] or call your pharmacy and they will forward the refill request to us. Please allow 3 business days for your refill to be completed.          Additional Information About Your Visit        ConisusharGoFormz Information     [employer]us lets you send messages to your doctor, view your test results, renew your prescriptions, schedule appointments and more. To sign up, go to [email or web address] . Click on \"Log in\" on the left side of the screen, which will take you to the Welcome page. Then click on \"Sign up Now\" on the right side of " "the page.     You will be asked to enter the access code listed below, as well as some personal information. Please follow the directions to create your username and password.     Your access code is: [email or web address]  Expires: 2017  7:08 PM     Your access code will  in 90 days. If you need help or a new code, please call your [place] clinic or [phone].        Care EveryWhere ID     This is your Care EveryWhere ID. This could be used by other organizations to access your [place] medical records  [id number]        Your Vitals Were     Pulse Temperature Height Pulse Oximetry Breastfeeding? BMI (Body Mass Index)    109 97.1  F (36.2  C) (Oral) 5' 1.25\" (1.556 m) 96% No 34.3 kg/m2       Blood Pressure from Last 3 Encounters:   17 100/60   17 122/73   04/15/16 109/70    Weight from Last 3 Encounters:   17 183 lb (83 kg)   04/15/16 181 lb (82.1 kg)   08/24/15 183 lb 14.4 oz (83.4 kg)                 Today's Medication Changes          These changes are accurate as of: 17 10:40 AM.  If you have any questions, ask your nurse or doctor.               These medicines have changed or have updated prescriptions.        Dose/Directions    ALPRAZolam 0.25 MG tablet   Commonly known as:  XANAX   This may have changed:  See the new instructions.   Used for:  Dizziness and giddiness, Stress   Changed by:  Rianna Sim MD        TAKE 2 TABLETS BY MOUTH THREE TIMES DAILY AS NEEDED FOR ANXIETY   Quantity:  45 tablet   Refills:  1         Stop taking these medicines if you haven't already. Please contact your care team if you have questions.     Norethin-Eth Estrad-Fe Biphas 1 MG-10 MCG / 10 MCG Tabs   Stopped by:  Rianna Sim MD                Where to get your medicines      Some of these will need a paper prescription and others can be bought over the counter.  Ask your nurse if you have questions.     Bring a paper prescription for each of these medications     ALPRAZolam 0.25 MG tablet    "             Primary Care Provider Office Phone # Fax #    Rianna Sim [phone]       [facility] [address] [facility] [address]  [facility] [address]        Equal Access to Services     MAHESH FISH : Hadii aad ku hadamieo Soomaali, waaxda luqadaha, qaybta kaalmada adeegyada, waxjohn diggsn nallelymerly stallings luann muñoz. So [facility] [phone].    ATENCIÓN: Si habla español, tiene a bonilla disposición servicios gratuitos de asistencia lingüística. Llame al [phone].    We comply with applicable federal civil rights laws and [place] laws. We do not discriminate on the basis of race, color, national origin, age, disability sex, sexual orientation or gender identity.            Thank you!     Thank you for choosing [facility]  for your care. Our goal is always to provide you with excellent care. Hearing back from our patients is one way we can continue to improve our services. Please take a few minutes to complete the written survey that you may receive in the mail after your visit with us. Thank you!             Your Updated Medication List - Protect others around you: Learn how to safely use, store and throw away your medicines at www.disposemymeds.org.          This list is accurate as of: 6/23/17 10:40 AM.  Always use your most recent med list.                   Brand Name Dispense Instructions for use Diagnosis    acyclovir 5 % cream    ZOVIRAX    5 g    Apply topically 5 times daily Apply to cold sores/fever blisters    Herpes simplex without mention of complication       albuterol 108 (90 BASE) MCG/ACT Inhaler    PROAIR HFA/PROVENTIL HFA/VENTOLIN HFA     Inhale 2 puffs into the lungs every 6 hours as needed for shortness of breath / dyspnea or wheezing        ALPRAZolam 0.25 MG tablet    XANAX    45 tablet    TAKE 2 TABLETS BY MOUTH THREE TIMES DAILY AS NEEDED FOR ANXIETY    Dizziness and giddiness, Stress       cetirizine 10 MG tablet    zyrTEC     Take 10 mg by mouth daily         clidinium-chlordiazePOXIDE 5-2.5 MG Caps per capsule    LIBRAX    30 capsule    TAKE ONE CAPSULE BY MOUTH DAILY AS NEEDED    Ulcerative colitis with complication, unspecified ulcerative colitis       cyclobenzaprine 10 MG tablet    FLEXERIL    30 tablet    Take 0.5-1 tablets (5-10 mg) by mouth every 8 hours as needed for muscle spasms    Chronic back pain       diphenhydrAMINE 25 MG capsule    BENADRYL     Take 1 capsule by mouth nightly as needed for itching. Post nasal drip causing esophageal spasms        folic acid 1 MG tablet    FOLVITE    90 tablet    TAKE 1 TABLET BY MOUTH EVERY DAY.    Ulcerative colitis with complication, unspecified ulcerative colitis       LEVSIN/SL 0.125 MG sublingual tablet   Generic drug:  hyoscyamine     30    1 TAB 4 TIMES DAILY, BEFORE MEALS AND AT BEDTIME    Unspecified disorder of esophagus       MACRODANTIN 100 MG capsule   Generic drug:  nitrofurantoin macrocrystal      1 CAPSULE AFTER SEX        metroNIDAZOLE 1 % gel    METROGEL    30 g    Apply  topically 2 times daily.    Rosacea       NIFEdipine ER osmotic 30 MG 24 hr tablet    NIFEDICAL XL    90 tablet    Take 1 tablet (30 mg) by mouth daily    Unspecified disorder of esophagus       * order for DME     1 each    Equipment being ordered:  Compression pump Flexitouch    Edema, unspecified edema       * order for DME     2 each    Equipment being ordered: Compression stockings 20-30 mm pressure    Edema       psyllium 0.52 G capsule      Take 1 capsule by mouth daily        sulfaSALAzine  MG EC tablet    AZULFIDINE EN    240 tablet    TAKE 4 TABLETS BY MOUTH TWICE DAILY    Ulcerative colitis with complication, unspecified location (H)       * Notice:  This list has 2 medication(s) that are the same as other medications prescribed for you. Read the directions carefully, and ask your doctor or other care provider to review them with you.

## 2017-06-30 ENCOUNTER — TELEPHONE (OUTPATIENT)
Dept: FAMILY MEDICINE | Facility: CLINIC | Age: 57
End: 2017-06-30

## 2017-06-30 NOTE — TELEPHONE ENCOUNTER
Reason for Call:  Other     Detailed comments: patient was seen 6/23.  She has low calcium and said  Dr Sim was going to discuss treatment for it, but said they got off on another subject and did not get to it.  She would like to know what to do.       Phone Number Patient can be reached at: Cell number on file:    Telephone Information:   Mobile [phone]     Best Time: any    Can we leave a detailed message on this number? YES  Ok to leave detailed information on voice mail    Call taken on 6/30/2017 at 9:41 AM by Kirsten Domingo

## 2017-06-30 NOTE — TELEPHONE ENCOUNTER
She needs to take Vitamin D 1000 international units daily  And 600 mg b.i.d. calcium in diet and supplements  Calcium is present in   Cheese  2. Yogurt  3. Milk  4. Sardines  5. Dark leafy greens like spinach, kale, turnips, and ildefonso greens  6. Fortified cereals such as Total, Raisin Bran, Corn Flakes (They have a lot of calcium in one serving.)  7. Fortified orange juice  8. Soybeans  9. Fortified soymilk (Not all soymilk is a good source of calcium, so it's best to check the label.)  10. Enriched breads, grains, and waffles

## 2017-06-30 NOTE — TELEPHONE ENCOUNTER
PCP: Please advise on low calcium levels and recommended treatment. See message below.     Ana Zendejas RN

## 2017-06-30 NOTE — TELEPHONE ENCOUNTER
I called patient and spoke with her   Relayed Dr. Sim's message below regarding Vit D 1,000 international units daily  And 600 mg BID calcium in diet and supplements.     Relayed high in calcium foods.     Patient will call back with questions.    Ana Zendejas RN

## 2017-08-05 ENCOUNTER — HEALTH MAINTENANCE LETTER (OUTPATIENT)
Age: 57
End: 2017-08-05

## 2017-08-15 DIAGNOSIS — K21.00 GASTROESOPHAGEAL REFLUX DISEASE WITH ESOPHAGITIS: ICD-10-CM

## 2017-08-15 RX ORDER — NIFEDIPINE 30 MG/1
30 TABLET, EXTENDED RELEASE ORAL DAILY
Qty: 90 TABLET | Refills: 0 | Status: CANCELLED | OUTPATIENT
Start: 2017-08-15

## 2017-08-15 NOTE — TELEPHONE ENCOUNTER
Pt has only been prescribed 30 mg, pharmacy is requesting 60mg    Pended 30mg as well, cancel as appropriate     Pending Prescriptions:                       Disp   Refills    NIFEDICAL XL 60 MG TB24 [Pharmacy Med Nam*30 tab*0            Sig: TAKE 1 TABLET BY MOUTH EVERY DAY    NIFEdipine ER osmotic (NIFEDICAL XL) 30 M*90 tab*0            Sig: Take 1 tablet (30 mg) by mouth daily     30mg       Last Written Prescription Date: 7/26/17  Last Fill Quantity: 90, # refills: 0    Last Office Visit with G, P or [facility] prescribing provider:  6/23/17 Chiquis   Future Office Visit:      BP Readings from Last 3 Encounters:   06/23/17 100/60   06/16/17 122/73   04/15/16 109/70     Lab Results   Component Value Date    ALT 16 04/15/2016     Lab Results   Component Value Date    CHOL 210 04/11/2012     Lab Results   Component Value Date    HDL 68 04/11/2012     Lab Results   Component Value Date     04/11/2012     Lab Results   Component Value Date    TRIG 110 04/11/2012     Lab Results   Component Value Date    CHOLHDLRATIO 3.1 04/11/2012     RT Emely(R)

## 2017-08-16 NOTE — TELEPHONE ENCOUNTER
I called patient and spoke with her.  She does report that for last 2 years she has been getting Nifedical XL 60 mg [id number] THROUGH [facility]    She has placed call to [facility] and is awaiting call back.   She would like refill to go through [facility] as that is who has been prescribing.     If for some reason [facility] comes back to patient and states that she needs to get medication through Dr. Sim, then patient will call clinic back.    Ana Zendejas RN

## 2017-08-18 NOTE — TELEPHONE ENCOUNTER
Sent denial to pharmacy with note that prescription should come from specialist ([facility])  Patient knows to call clinic if [facility] not able to fill prescription for her.     Ana Zendejas RN

## 2017-09-02 DIAGNOSIS — K51.919 ULCERATIVE COLITIS WITH COMPLICATION, UNSPECIFIED LOCATION (H): ICD-10-CM

## 2017-09-02 NOTE — TELEPHONE ENCOUNTER
sulfaSALAzine ER (AZULFIDINE EN) 500 MG EC tablet      Last Written Prescription Date:  1/25/17  Last Fill Quantity: 240,   # refills: 3  Last Office Visit with [facility] or [facility] prescribing provider: 6/23/17  Future Office visit:       Routing refill request to provider for review/approval because:  Drug not on the [facility], [facility] or [facility] refill protocol or controlled substance            Yessica QUINONES(R)

## 2017-09-05 RX ORDER — SULFASALAZINE 500 MG/1
TABLET, DELAYED RELEASE ORAL
Qty: 240 TABLET | Refills: 2 | Status: SHIPPED | OUTPATIENT
Start: 2017-09-05 | End: 2019-05-08

## 2017-10-13 ENCOUNTER — TRANSFERRED RECORDS (OUTPATIENT)
Dept: HEALTH INFORMATION MANAGEMENT | Facility: CLINIC | Age: 57
End: 2017-10-13

## 2017-11-17 ENCOUNTER — TELEPHONE (OUTPATIENT)
Dept: FAMILY MEDICINE | Facility: CLINIC | Age: 57
End: 2017-11-17

## 2017-11-17 NOTE — TELEPHONE ENCOUNTER
"11/17/2017      Patient Communication Preferences indicate  Do not contact  and/or communication by \"Phone\" is not preferred. Call not required per Outreach team.      Outreach ,  Terry Juárez        "

## 2017-11-30 DIAGNOSIS — K51.919 ULCERATIVE COLITIS WITH COMPLICATION, UNSPECIFIED LOCATION (H): ICD-10-CM

## 2017-11-30 NOTE — TELEPHONE ENCOUNTER
Pending Prescriptions:                       Disp   Refills    sulfaSALAzine ER (AZULFIDINE EN) 500 MG E*240 ta*0            Sig: TAKE 4 TABLETS BY MOUTH TWICE DAILY        Last Written Prescription Date:  9/5/17  Last Fill Quantity: 240,   # refills: 2  Last Office Visit: 6/23/17 [facility] Office visit:         Lisbeth Adkins RT(R)

## 2017-12-01 RX ORDER — SULFASALAZINE 500 MG/1
TABLET, DELAYED RELEASE ORAL
Start: 2017-12-01

## 2017-12-13 ENCOUNTER — TRANSFERRED RECORDS (OUTPATIENT)
Dept: HEALTH INFORMATION MANAGEMENT | Facility: CLINIC | Age: 57
End: 2017-12-13

## 2018-04-16 ENCOUNTER — HOSPITAL ENCOUNTER (OUTPATIENT)
Dept: MAMMOGRAPHY | Facility: CLINIC | Age: 58
Discharge: HOME | End: 2018-04-16
Attending: OBSTETRICS & GYNECOLOGY | Admitting: OBSTETRICS & GYNECOLOGY
Payer: COMMERCIAL

## 2018-04-16 DIAGNOSIS — Z12.31 VISIT FOR SCREENING MAMMOGRAM: ICD-10-CM

## 2018-04-16 PROCEDURE — 77067 SCR MAMMO BI INCL CAD: CPT

## 2018-06-07 ENCOUNTER — TRANSFERRED RECORDS (OUTPATIENT)
Dept: HEALTH INFORMATION MANAGEMENT | Facility: CLINIC | Age: 58
End: 2018-06-07

## 2018-07-01 ENCOUNTER — TRANSFERRED RECORDS (OUTPATIENT)
Dept: MULTI SPECIALTY CLINIC | Facility: CLINIC | Age: 58
End: 2018-07-01

## 2018-07-01 LAB — PAP SMEAR - HIM PATIENT REPORTED: NEGATIVE

## 2018-07-06 ENCOUNTER — TRANSFERRED RECORDS (OUTPATIENT)
Dept: HEALTH INFORMATION MANAGEMENT | Facility: CLINIC | Age: 58
End: 2018-07-06

## 2018-07-11 ENCOUNTER — TRANSFERRED RECORDS (OUTPATIENT)
Dept: HEALTH INFORMATION MANAGEMENT | Facility: CLINIC | Age: 58
End: 2018-07-11

## 2018-07-25 ENCOUNTER — TRANSFERRED RECORDS (OUTPATIENT)
Dept: HEALTH INFORMATION MANAGEMENT | Facility: CLINIC | Age: 58
End: 2018-07-25

## 2018-08-08 ENCOUNTER — TRANSFERRED RECORDS (OUTPATIENT)
Dept: HEALTH INFORMATION MANAGEMENT | Facility: CLINIC | Age: 58
End: 2018-08-08

## 2018-08-08 ENCOUNTER — MEDICAL CORRESPONDENCE (OUTPATIENT)
Dept: HEALTH INFORMATION MANAGEMENT | Facility: CLINIC | Age: 58
End: 2018-08-08

## 2018-08-10 ENCOUNTER — TELEPHONE (OUTPATIENT)
Dept: FAMILY MEDICINE | Facility: CLINIC | Age: 58
End: 2018-08-10

## 2018-08-10 ENCOUNTER — OFFICE VISIT (OUTPATIENT)
Dept: FAMILY MEDICINE | Facility: CLINIC | Age: 58
End: 2018-08-10
Payer: COMMERCIAL

## 2018-08-10 VITALS
HEART RATE: 82 BPM | WEIGHT: 183 LBS | DIASTOLIC BLOOD PRESSURE: 61 MMHG | OXYGEN SATURATION: 96 % | SYSTOLIC BLOOD PRESSURE: 99 MMHG | HEIGHT: 62 IN | TEMPERATURE: 98.5 F | BODY MASS INDEX: 33.68 KG/M2

## 2018-08-10 DIAGNOSIS — M54.50 CHRONIC MIDLINE LOW BACK PAIN WITHOUT SCIATICA: ICD-10-CM

## 2018-08-10 DIAGNOSIS — R60.0 LOCALIZED EDEMA: ICD-10-CM

## 2018-08-10 DIAGNOSIS — Z11.59 NEED FOR HEPATITIS C SCREENING TEST: ICD-10-CM

## 2018-08-10 DIAGNOSIS — F43.9 STRESS: ICD-10-CM

## 2018-08-10 DIAGNOSIS — T78.40XS ALLERGIC STATE, SEQUELA: ICD-10-CM

## 2018-08-10 DIAGNOSIS — Z23 NEED FOR PROPHYLACTIC VACCINATION WITH TETANUS-DIPHTHERIA (TD): ICD-10-CM

## 2018-08-10 DIAGNOSIS — L71.9 ROSACEA: ICD-10-CM

## 2018-08-10 DIAGNOSIS — D84.9 IMMUNOCOMPROMISED (H): ICD-10-CM

## 2018-08-10 DIAGNOSIS — G89.29 CHRONIC MIDLINE LOW BACK PAIN WITHOUT SCIATICA: ICD-10-CM

## 2018-08-10 DIAGNOSIS — Z11.4 SCREENING FOR HIV (HUMAN IMMUNODEFICIENCY VIRUS): ICD-10-CM

## 2018-08-10 DIAGNOSIS — R42 DIZZINESS AND GIDDINESS: ICD-10-CM

## 2018-08-10 DIAGNOSIS — K22.9 DISORDER OF ESOPHAGUS: ICD-10-CM

## 2018-08-10 DIAGNOSIS — K51.919 ULCERATIVE COLITIS WITH COMPLICATION, UNSPECIFIED LOCATION (H): ICD-10-CM

## 2018-08-10 DIAGNOSIS — Z00.00 ROUTINE GENERAL MEDICAL EXAMINATION AT A HEALTH CARE FACILITY: Primary | ICD-10-CM

## 2018-08-10 PROCEDURE — 36415 COLL VENOUS BLD VENIPUNCTURE: CPT | Performed by: INTERNAL MEDICINE

## 2018-08-10 PROCEDURE — 99396 PREV VISIT EST AGE 40-64: CPT | Mod: 25 | Performed by: INTERNAL MEDICINE

## 2018-08-10 PROCEDURE — 90714 TD VACC NO PRESV 7 YRS+ IM: CPT | Performed by: INTERNAL MEDICINE

## 2018-08-10 PROCEDURE — 80061 LIPID PANEL: CPT | Performed by: INTERNAL MEDICINE

## 2018-08-10 PROCEDURE — 90471 IMMUNIZATION ADMIN: CPT | Performed by: INTERNAL MEDICINE

## 2018-08-10 PROCEDURE — 86803 HEPATITIS C AB TEST: CPT | Performed by: INTERNAL MEDICINE

## 2018-08-10 PROCEDURE — 87389 HIV-1 AG W/HIV-1&-2 AB AG IA: CPT | Performed by: INTERNAL MEDICINE

## 2018-08-10 RX ORDER — CHLORDIAZEPOXIDE HYDROCHLORIDE AND CLIDINIUM BROMIDE 5; 2.5 MG/1; MG/1
CAPSULE ORAL
Qty: 30 CAPSULE | Refills: 12 | Status: SHIPPED | OUTPATIENT
Start: 2018-08-10 | End: 2018-08-10

## 2018-08-10 RX ORDER — METRONIDAZOLE 10 MG/G
GEL TOPICAL
Qty: 30 G | Refills: 12 | Status: SHIPPED | OUTPATIENT
Start: 2018-08-10

## 2018-08-10 RX ORDER — ERGOCALCIFEROL 1.25 MG/1
CAPSULE, LIQUID FILLED ORAL
Status: ON HOLD | COMMUNITY
Start: 2018-08-06 | End: 2019-05-13

## 2018-08-10 RX ORDER — CYCLOBENZAPRINE HCL 10 MG
TABLET ORAL
Qty: 30 TABLET | Refills: 3 | Status: SHIPPED | OUTPATIENT
Start: 2018-08-10

## 2018-08-10 RX ORDER — INFLIXIMAB 100 MG/10ML
INJECTION, POWDER, LYOPHILIZED, FOR SOLUTION INTRAVENOUS
COMMUNITY
Start: 2018-07-11

## 2018-08-10 RX ORDER — CHLORDIAZEPOXIDE HYDROCHLORIDE AND CLIDINIUM BROMIDE 5; 2.5 MG/1; MG/1
CAPSULE ORAL
Qty: 30 CAPSULE | Refills: 12 | Status: SHIPPED | OUTPATIENT
Start: 2018-08-10 | End: 2018-08-24

## 2018-08-10 RX ORDER — ALPRAZOLAM 0.25 MG
TABLET ORAL
Qty: 45 TABLET | Refills: 1 | Status: SHIPPED | OUTPATIENT
Start: 2018-08-10 | End: 2019-03-13

## 2018-08-10 NOTE — NURSING NOTE
Screening Questionnaire for Adult Immunization    Are you sick today?   No   Do you have allergies to medications, food, a vaccine component or latex?   Yes   Have you ever had a serious reaction after receiving a vaccination?   No   Do you have a long-term health problem with heart disease, lung disease, asthma, kidney disease, metabolic disease (e.g. diabetes), anemia, or other blood disorder?   No   Do you have cancer, leukemia, HIV/AIDS, or any other immune system problem?   Yes   In the past 3 months, have you taken medications that affect  your immune system, such as prednisone, other steroids, or anticancer drugs; drugs for the treatment of rheumatoid arthritis, Crohn s disease, or psoriasis; or have you had radiation treatments?   Yes   Have you had a seizure, or a brain or other nervous system problem?   No   During the past year, have you received a transfusion of blood or blood     products, or been given immune (gamma) globulin or antiviral drug?   No   For women: Are you pregnant or is there a chance you could become        pregnant during the next month?   No   Have you received any vaccinations in the past 4 weeks?   No     Immunization questionnaire was positive for at least one answer.  Notified Dr. Sim.        Per orders of Dr. Sim, injection of TD given by Vonda Rivera. Patient instructed to remain in clinic for 15 minutes afterwards, and to report any adverse reaction to me immediately.       Screening performed by Vonda Rivera on 8/10/2018 at 11:39 AM.

## 2018-08-10 NOTE — NURSING NOTE
Screening Questionnaire for Adult Immunization    Are you sick today?   No   Do you have allergies to medications, food, a vaccine component or latex?   Yes   Have you ever had a serious reaction after receiving a vaccination?   No   Do you have a long-term health problem with heart disease, lung disease, asthma, kidney disease, metabolic disease (e.g. diabetes), anemia, or other blood disorder?   No   Do you have cancer, leukemia, HIV/AIDS, or any other immune system problem?   No   In the past 3 months, have you taken medications that affect  your immune system, such as prednisone, other steroids, or anticancer drugs; drugs for the treatment of rheumatoid arthritis, Crohn s disease, or psoriasis; or have you had radiation treatments?   No   Have you had a seizure, or a brain or other nervous system problem?   No   During the past year, have you received a transfusion of blood or blood     products, or been given immune (gamma) globulin or antiviral drug?   No   For women: Are you pregnant or is there a chance you could become        pregnant during the next month?   No   Have you received any vaccinations in the past 4 weeks?   No     Immunization questionnaire was positive for at least one answer.  Notified Dr. Sim.        Per orders of Dr. Sim, injection of TD given by Vonda Rivera. Patient instructed to remain in clinic for 15 minutes afterwards, and to report any adverse reaction to me immediately.       Screening performed by Vonda Rivera on 8/10/2018 at 1:48 PM.

## 2018-08-10 NOTE — MR AVS SNAPSHOT
After Visit Summary   8/10/2018    John Steven    MRN: [id number]           Patient Information     Date Of Birth          1960        Visit Information        Provider Department      8/10/2018 11:00 AM Rianna Sim MD [facility]        Today's Diagnoses     Routine general medical examination at a health care facility    -  1    Ulcerative colitis with complication, unspecified location (H)        Immunocompromised (H)        Disorder of esophagus        Localized edema        Rosacea        Allergic state, sequela        Chronic midline low back pain without sciatica        Need for hepatitis C screening test        Screening for HIV (human immunodeficiency virus)        Need for prophylactic vaccination with tetanus-diphtheria (TD)        Dizziness and giddiness        Stress          Care Instructions      Preventive Health Recommendations  Female Ages 50 - 64    Yearly exam: See your health care provider every year in order to  o Review health changes.   o Discuss preventive care.    o Review your medicines if your doctor has prescribed any.      Get a Pap test every three years (unless you have an abnormal result and your provider advises testing more often).    If you get Pap tests with HPV test, you only need to test every 5 years, unless you have an abnormal result.     You do not need a Pap test if your uterus was removed (hysterectomy) and you have not had cancer.    You should be tested each year for STDs (sexually transmitted diseases) if you're at risk.     Have a mammogram every 1 to 2 years.    Have a colonoscopy at age 50, or have a yearly FIT test (stool test). These exams screen for colon cancer.      Have a cholesterol test every 5 years, or more often if advised.    Have a diabetes test (fasting glucose) every three years. If you are at risk for diabetes, you should have this test more often.     If you are at risk for osteoporosis (brittle bone disease), think  about having a bone density scan (DEXA).    Shots: Get a flu shot each year. Get a tetanus shot every 10 years.    Nutrition:     Eat at least 5 servings of fruits and vegetables each day.    Eat whole-grain bread, whole-wheat pasta and brown rice instead of white grains and rice.    Get adequate Calcium and Vitamin D.     Lifestyle    Exercise at least 150 minutes a week (30 minutes a day, 5 days a week). This will help you control your weight and prevent disease.    Limit alcohol to one drink per day.    No smoking.     Wear sunscreen to prevent skin cancer.     See your dentist every six months for an exam and cleaning.    See your eye doctor every 1 to 2 years.            Follow-ups after your visit        Who to contact     If you have questions or need follow up information about today's clinic visit or your schedule please contact [facility] directly at [phone].  Normal or non-critical lab and imaging results will be communicated to you by [employer], letter or phone within 4 business days after the clinic has received the results. If you do not hear from us within 7 days, please contact the clinic through [employer] or phone. If you have a critical or abnormal lab result, we will notify you by phone as soon as possible.  Submit refill requests through [employer] or call your pharmacy and they will forward the refill request to us. Please allow 3 business days for your refill to be completed.          Additional Information About Your Visit        [employer] Information     [employer] gives you secure access to your electronic health record. If you see a primary care provider, you can also send messages to your care team and make appointments. If you have questions, please call your primary care clinic.  If you do not have a primary care provider, please call [phone] and they will assist you.        Care EveryWhere ID     This is your Care EveryWhere ID. This could be used by other organizations to  "access your [facility] medical records  [id number]        Your Vitals Were     Pulse Temperature Height Pulse Oximetry Breastfeeding? BMI (Body Mass Index)    82 98.5  F (36.9  C) (Tympanic) 5' 1.8\" (1.57 m) 96% No 33.69 kg/m2       Blood Pressure from Last 3 Encounters:   08/10/18 99/61   06/23/17 100/60   06/16/17 122/73    Weight from Last 3 Encounters:   08/10/18 183 lb (83 kg)   06/23/17 183 lb (83 kg)   04/15/16 181 lb (82.1 kg)              We Performed the Following          ADMIN VACCINE, FIRST     Hepatitis C Screen Reflex to HCV RNA Quant and Genotype     HIV Screening     Lipid panel reflex to direct LDL Non-fasting     TD (ADULT, 7+) PRESERVE FREE          Today's Medication Changes          These changes are accurate as of 8/10/18 11:31 AM.  If you have any questions, ask your nurse or doctor.               Start taking these medicines.        Dose/Directions    order for DME   Used for:  Localized edema   Started by:  Rianna Sim MD        Equipment being ordered: Medium compression stockings  20-30 mm   Quantity:  2 each   Refills:  1         These medicines have changed or have updated prescriptions.        Dose/Directions    * albuterol 108 (90 Base) MCG/ACT inhaler   Commonly known as:  PROAIR HFA/PROVENTIL HFA/VENTOLIN HFA   This may have changed:  Another medication with the same name was added. Make sure you understand how and when to take each.   Changed by:  Rianna Sim MD        Dose:  2 puff   Inhale 2 puffs into the lungs every 6 hours as needed for shortness of breath / dyspnea or wheezing   Refills:  0       * albuterol 108 (90 Base) MCG/ACT Aepb inhaler   Commonly known as:  PROAIR RESPICLICK   This may have changed:  You were already taking a medication with the same name, and this prescription was added. Make sure you understand how and when to take each.   Used for:  Allergic state, sequela   Changed by:  Rianna Sim MD        Dose:  2 puff   Inhale 2 puffs into the lungs " every 6 hours as needed for shortness of breath / dyspnea or wheezing   Quantity:  1 each   Refills:  3       * Notice:  This list has 2 medication(s) that are the same as other medications prescribed for you. Read the directions carefully, and ask your doctor or other care provider to review them with you.         Where to get your medicines      These medications were sent to [email or web address] [address] [address] [place] [facility] [address], [facility] [address]     Phone:  [phone]     albuterol 108 (90 Base) MCG/ACT Aepb inhaler    clidinium-chlordiazePOXIDE 5-2.5 MG Caps per capsule    cyclobenzaprine 10 MG tablet    metroNIDAZOLE 1 % gel         Some of these will need a paper prescription and others can be bought over the counter.  Ask your nurse if you have questions.     Bring a paper prescription for each of these medications     ALPRAZolam 0.25 MG tablet    order for DME                Primary Care Provider Office Phone # Fax #    Rianna Sim [phone] [address] [facility] [address]  [facility] [address]        Equal Access to Services     WANDER FISH AH: Hadii marylu cristina hadamieo Sodominguez, waaxda luqadaha, qaybta kaalmada adestalin, lavon muñzo. So [facility] [place] [phone].    ATENCIÓN: Si habla español, tiene a bonilla disposición servicios gratuitos de asistencia lingüística. Kaydename al [phone].    We comply with applicable federal civil rights laws and [place] laws. We do not discriminate on the basis of race, color, national origin, age, disability, sex, sexual orientation, or gender identity.            Thank you!     Thank you for choosing [facility]  for your care. Our goal is always to provide you with excellent care. Hearing back from our patients is one way we can continue to improve our services. Please take a few minutes to complete the written survey that you may receive in the mail after your  visit with us. Thank you!             Your Updated Medication List - Protect others around you: Learn how to safely use, store and throw away your medicines at www.disposemymeds.org.          This list is accurate as of 8/10/18 11:31 AM.  Always use your most recent med list.                   Brand Name Dispense Instructions for use Diagnosis    acyclovir 5 % cream    ZOVIRAX    5 g    Apply topically 5 times daily Apply to cold sores/fever blisters    Herpes simplex without mention of complication       * albuterol 108 (90 Base) MCG/ACT inhaler    PROAIR HFA/PROVENTIL HFA/VENTOLIN HFA     Inhale 2 puffs into the lungs every 6 hours as needed for shortness of breath / dyspnea or wheezing        * albuterol 108 (90 Base) MCG/ACT Aepb inhaler    PROAIR RESPICLICK    1 each    Inhale 2 puffs into the lungs every 6 hours as needed for shortness of breath / dyspnea or wheezing    Allergic state, sequela       ALPRAZolam 0.25 MG tablet    XANAX    45 tablet    TAKE 2 TABLETS BY MOUTH THREE TIMES DAILY AS NEEDED FOR ANXIETY    Dizziness and giddiness, Stress       ANUCORT-HC 25 MG Suppository   Generic drug:  hydrocortisone      UNW AND I 1 SUP REC  Q EVENING FOR 2 WEEKS        CANASA 1000 MG Suppository   Generic drug:  mesalamine      UNW AND I 1 SUP REC QD PRN        cetirizine 10 MG tablet    zyrTEC     Take 10 mg by mouth daily        clidinium-chlordiazePOXIDE 5-2.5 MG Caps per capsule    LIBRAX    30 capsule    TAKE ONE CAPSULE BY MOUTH DAILY AS NEEDED    Ulcerative colitis with complication, unspecified location (H), Disorder of esophagus       CORTIFOAM 10 % rectal foam   Generic drug:  hydrocortisone      JESSICA REC BID FOR 14 DAYS        cyclobenzaprine 10 MG tablet    FLEXERIL    30 tablet    TAKE 1/2 TO 1 TABLET(5 TO 10 MG) BY MOUTH EVERY 8 HOURS AS NEEDED FOR MUSCLE SPASMS    Chronic midline low back pain without sciatica       diphenhydrAMINE 25 MG capsule    BENADRYL     Take 1 capsule by mouth nightly as  needed for itching. Post nasal drip causing esophageal spasms        folic acid 1 MG tablet    FOLVITE    90 tablet    TAKE 1 TABLET BY MOUTH EVERY DAY.    Ulcerative colitis with complication, unspecified ulcerative colitis       hydrocortisone 100 MG/60ML enema    CORTENEMA     PLACE 1 ENEMA  BY RECTAL ROUTE QD FOR 2 WEEKS HS.        LEVSIN/SL 0.125 MG Subl   Generic drug:  Hyoscyamine Sulfate SL     30    1 TAB 4 TIMES DAILY, BEFORE MEALS AND AT BEDTIME    Unspecified disorder of esophagus       methotrexate 2.5 MG tablet CHEMO           metroNIDAZOLE 1 % gel    METROGEL    30 g    Apply  topically 2 times daily.    Rosacea       NIFEdipine ER osmotic 30 MG 24 hr tablet    NIFEDICAL XL    90 tablet    Take 1 tablet (30 mg) by mouth daily    Gastroesophageal reflux disease with esophagitis       * order for DME     1 each    Equipment being ordered:  Compression pump Flexitouch    Edema, unspecified edema       * order for DME     2 each    Equipment being ordered: Compression stockings 20-30 mm pressure    Edema       order for DME     2 each    Equipment being ordered: Medium compression stockings  20-30 mm    Localized edema       progesterone 200 MG capsule    PROMETRIUM          psyllium 0.52 g capsule      Take 1 capsule by mouth daily        REMICADE 100 MG injection   Generic drug:  inFLIXimab           sulfaSALAzine  MG EC tablet    AZULFIDINE EN    240 tablet    TAKE 4 TABLETS BY MOUTH TWICE DAILY    Ulcerative colitis with complication, unspecified location (H)       vitamin D 49151 UNIT capsule    ERGOCALCIFEROL          * Notice:  This list has 4 medication(s) that are the same as other medications prescribed for you. Read the directions carefully, and ask your doctor or other care provider to review them with you.

## 2018-08-10 NOTE — PROGRESS NOTES
SUBJECTIVE:   CC: John Steven is an 58 year old woman who presents for preventive health visit.     Healthy Habits:     Do you get at least three servings of calcium containing foods daily (dairy, green leafy vegetables, etc.)? no, taking calcium and/or vitamin D supplement: yes - vitamin D    Amount of exercise or daily activities, outside of work: 3 day(s) per week    Problems taking medications regularly No    Medication side effects: No    Have you had an eye exam in the past two years? yes    Do you see a dentist twice per year? yes    Do you have sleep apnea, excessive snoring or daytime drowsiness?no    Patient had ulcerative colitis flare up  She is doing well now on Methotrexate and Remicade  She still has esophageal issues, on Nifedipine  Lot of stress in life,    needs hip surgery      Today's PHQ-2 Score:   PHQ-2 ( 1999 [employer]) 6/23/2017 4/15/2016   Q1: Little interest or pleasure in doing things 0 0   Q2: Feeling down, depressed or hopeless 0 0   PHQ-2 Score 0 0       Abuse: Current or Past(Physical, Sexual or Emotional)- No  Do you feel safe in your environment - Yes    Social History   Substance Use Topics     Smoking status: Former Smoker     Packs/day: 1.00     Years: 10.00     Types: Cigarettes     Quit date: 1/1/1989     Smokeless tobacco: Never Used     Alcohol use 0.0 oz/week     0 Standard drinks or equivalent per week      Comment: 2-3 drinks  times a week     If you drink alcohol do you typically have >3 drinks per day or >7 drinks per week? No                     Reviewed orders with patient.  Reviewed health maintenance and updated orders accordingly - Yes  Patient Active Problem List   Diagnosis     Other specified disorder of rectum and anus     Ulcerative colitis (H)     Other urethritis(437.89)     Other and unspecified hormones and synthetic substitutes causing adverse effect in therapeutic use     Allergic rhinitis     Disorder of esophagus     Rosacea     Dizziness and  giddiness     Obesity     Past Surgical History:   Procedure Laterality Date     BIOPSY      BREAST     C NONSPECIFIC PROCEDURE  2003    Heller myotomy     C NONSPECIFIC PROCEDURE      EB for spotting,fibroid     C NONSPECIFIC PROCEDURE  2007    rt breast biopsy     DILATION AND CURETTAGE, HYSTEROSCOPY DIAGNOSTIC, COMBINED N/A 8/24/2015    Procedure: COMBINED DILATION AND CURETTAGE, HYSTEROSCOPY DIAGNOSTIC;  Surgeon: Chelle Rebollar MD;  Location: [facility]     GI SURGERY       GYN SURGERY       HC COLONOSCOPY THRU STOMA, DIAGNOSTIC  4/06       Social History   Substance Use Topics     Smoking status: Former Smoker     Packs/day: 1.00     Years: 10.00     Types: Cigarettes     Quit date: 1/1/1989     Smokeless tobacco: Never Used     Alcohol use 0.0 oz/week     0 Standard drinks or equivalent per week      Comment: 2-3 drinks  times a week     Family History   Problem Relation Age of Onset     Cerebrovascular Disease Mother      80     Diabetes Mother      Breast Cancer Mother      60     Blood Disease Mother      merkel cell     GASTROINTESTINAL DISEASE Brother      Collagenous colitis     Asthma Son      Diabetes Maternal Grandmother          Current Outpatient Prescriptions   Medication Sig Dispense Refill     acyclovir (ZOVIRAX) 5 % cream Apply topically 5 times daily Apply to cold sores/fever blisters 5 g 3     albuterol (PROAIR HFA, PROVENTIL HFA, VENTOLIN HFA) 108 (90 BASE) MCG/ACT inhaler Inhale 2 puffs into the lungs every 6 hours as needed for shortness of breath / dyspnea or wheezing       ALPRAZolam (XANAX) 0.25 MG tablet TAKE 2 TABLETS BY MOUTH THREE TIMES DAILY AS NEEDED FOR ANXIETY 45 tablet 1     ANUCORT-HC 25 MG Suppository UNW AND I 1 SUP REC  Q EVENING FOR 2 WEEKS  1     CANASA 1000 MG Suppository UNW AND I 1 SUP REC QD PRN  10     cetirizine (ZYRTEC) 10 MG tablet Take 10 mg by mouth daily       clidinium-chlordiazePOXIDE (LIBRAX) 5-2.5 MG CAPS TAKE ONE CAPSULE BY MOUTH DAILY AS NEEDED 30  capsule 12     CORTIFOAM 10 % rectal foam JESSICA REC BID FOR 14 DAYS  0     cyclobenzaprine (FLEXERIL) 10 MG tablet TAKE 1/2 TO 1 TABLET(5 TO 10 MG) BY MOUTH EVERY 8 HOURS AS NEEDED FOR MUSCLE SPASMS 30 tablet 0     diphenhydrAMINE (BENADRYL) 25 MG capsule Take 1 capsule by mouth nightly as needed for itching. Post nasal drip causing esophageal spasms       folic acid (FOLVITE) 1 MG tablet TAKE 1 TABLET BY MOUTH EVERY DAY. 90 tablet 3     hydrocortisone (CORTENEMA) 100 MG/60ML enema PLACE 1 ENEMA  BY RECTAL ROUTE QD FOR 2 WEEKS HS.  1     LEVSIN/SL 0.125 MG SL SUBL 1 TAB 4 TIMES DAILY, BEFORE MEALS AND AT BEDTIME 30 12     methotrexate 2.5 MG tablet CHEMO        metroNIDAZOLE (METROGEL) 1 % gel Apply  topically 2 times daily. 30 g 12     NIFEdipine ER osmotic (NIFEDICAL XL) 30 MG 24 hr tablet Take 1 tablet (30 mg) by mouth daily 90 tablet 0     order for DME Equipment being ordered: Medium compression stockings   20-30 mm 2 each 1     order for DME Equipment being ordered: Compression stockings  20-30 mm pressure 2 each 3     order for DME Equipment being ordered:  Compression pump  Flexitouch 1 each 0     psyllium 0.52 G capsule Take 1 capsule by mouth daily       REMICADE 100 MG injection        sulfaSALAzine ER (AZULFIDINE EN) 500 MG EC tablet TAKE 4 TABLETS BY MOUTH TWICE DAILY 240 tablet 2     progesterone (PROMETRIUM) 200 MG capsule        vitamin D (ERGOCALCIFEROL) 97863 UNIT capsule        [DISCONTINUED] metroNIDAZOLE (METROGEL) 1 % gel Apply  topically 2 times daily. 30 g 12       Patient over age 50, mutual decision to screen reflected in health maintenance.    Pertinent mammograms are reviewed under the imaging tab.  History of abnormal Pap smear: NO - age 30- 65 PAP every 3 years recommended     Reviewed and updated as needed this visit by clinical staff  Tobacco  Allergies  Meds  Problems         Reviewed and updated as needed this visit by Provider  Allergies  Meds  Problems            ROS:   ROS:  "10 point ROS neg other than the symptoms noted above in the HPI.    OBJECTIVE:   BP 99/61 (BP Location: Left arm, Cuff Size: Adult Large)  Pulse 82  Temp 98.5  F (36.9  C) (Tympanic)  Ht 5' 1.8\" (1.57 m)  Wt 183 lb (83 kg)  SpO2 96%  Breastfeeding? No  BMI 33.69 kg/m2  EXAM:  GENERAL APPEARANCE: healthy, alert and no distress  EYES: Eyes grossly normal to inspection, PERRL and conjunctivae and sclerae normal  HENT: ear canals and TM's normal, nose and mouth without ulcers or lesions, oropharynx clear and oral mucous membranes moist  NECK: no adenopathy, no asymmetry, masses, or scars and thyroid normal to palpation  RESP: lungs clear to auscultation - no rales, rhonchi or wheezes  BREAST: normal without masses, tenderness or nipple discharge and no palpable axillary masses or adenopathy  CV: regular rate and rhythm, normal S1 S2, no S3 or S4, no murmur, click or rub, no peripheral edema and peripheral pulses strong  ABDOMEN: soft, nontender, no hepatosplenomegaly, no masses and bowel sounds normal  MS: no musculoskeletal defects are noted and gait is age appropriate without ataxia  SKIN: no suspicious lesions or rashes  Freckles, benign nevi  NEURO: Normal strength and tone, sensory exam grossly normal, mentation intact and speech normal  PSYCH: mentation appears normal and affect normal/bright        ASSESSMENT/PLAN:   John was seen today for physical.    Diagnoses and all orders for this visit:    Routine general medical examination at a health care facility  Mammogram in April EB scheduled with gyn next week  We talked about Shingrix also    Ulcerative colitis with complication, unspecified location (H)  -     Lipid panel reflex to direct LDL Non-fasting  -     clidinium-chlordiazePOXIDE (LIBRAX) 5-2.5 MG CAPS per capsule; TAKE ONE CAPSULE BY MOUTH DAILY AS NEEDED  In remission now after enterocort  Also, on methotrexate and Remicade  Immunocompromised (H)  Will take shingrix  Advise about stress steroid " "doses    Disorder of esophagus  -     clidinium-chlordiazePOXIDE (LIBRAX) 5-2.5 MG CAPS per capsule; TAKE ONE CAPSULE BY MOUTH DAILY AS NEEDED  On Nifedipine  Localized edema  -     order for DME; Equipment being ordered: Medium compression stockings   20-30 mm    Rosacea  -     metroNIDAZOLE (METROGEL) 1 % gel; Apply  topically 2 times daily.    Allergic state, sequela  -     albuterol (PROAIR RESPICLICK) 108 (90 Base) MCG/ACT AEPB inhaler; Inhale 2 puffs into the lungs every 6 hours as needed for shortness of breath / dyspnea or wheezing    Chronic midline low back pain without sciatica  -     cyclobenzaprine (FLEXERIL) 10 MG tablet; TAKE 1/2 TO 1 TABLET(5 TO 10 MG) BY MOUTH EVERY 8 HOURS AS NEEDED FOR MUSCLE SPASMS    Need for hepatitis C screening test  -     Hepatitis C Screen Reflex to HCV RNA Quant and Genotype    Screening for HIV (human immunodeficiency virus)  -     HIV Screening    Need for prophylactic vaccination with tetanus-diphtheria (TD)  -     TD (ADULT, 7+) PRESERVE FREE  -          ADMIN VACCINE, FIRST    Dizziness and giddiness  -     ALPRAZolam (XANAX) 0.25 MG tablet; TAKE 2 TABLETS BY MOUTH THREE TIMES DAILY AS NEEDED FOR ANXIETY    Stress  -     ALPRAZolam (XANAX) 0.25 MG tablet; TAKE 2 TABLETS BY MOUTH THREE TIMES DAILY AS NEEDED FOR ANXIETY    For colitis work up prn        COUNSELING:   Reviewed preventive health counseling, as reflected in patient instructions       Regular exercise       Healthy diet/nutrition    BP Readings from Last 1 Encounters:   08/10/18 99/61     Estimated body mass index is 33.69 kg/(m^2) as calculated from the following:    Height as of this encounter: 5' 1.8\" (1.57 m).    Weight as of this encounter: 183 lb (83 kg).      Weight management plan: Discussed healthy diet and exercise guidelines and patient will follow up in 6 months in clinic to re-evaluate.     reports that she quit smoking about 29 years ago. Her smoking use included Cigarettes. She has a 10.00 " pack-year smoking history. She has never used smokeless tobacco.      Counseling Resources:  ATP IV Guidelines  Pooled Cohorts Equation Calculator  Breast Cancer Risk Calculator  FRAX Risk Assessment  ICSI Preventive Guidelines  Dietary Guidelines for Americans, 2010  USDA's MyPlate  ASA Prophylaxis  Lung CA Screening    Rianna Sim MD  [facility]

## 2018-08-10 NOTE — TELEPHONE ENCOUNTER
Dr Sim,   You Rx'd Librax Rx for patient today  E-prescribed failed  I tried to resend Rx - failed again  Can you please print Rx for us to hand fax instead?  Thank you,  Valorie MOSES RN

## 2018-08-11 LAB
CHOLEST SERPL-MCNC: 170 MG/DL
HCV AB SERPL QL IA: NONREACTIVE
HDLC SERPL-MCNC: 51 MG/DL
HIV 1+2 AB+HIV1 P24 AG SERPL QL IA: NONREACTIVE
LDLC SERPL CALC-MCNC: 98 MG/DL
NONHDLC SERPL-MCNC: 119 MG/DL
TRIGL SERPL-MCNC: 103 MG/DL

## 2018-08-22 ENCOUNTER — TRANSFERRED RECORDS (OUTPATIENT)
Dept: HEALTH INFORMATION MANAGEMENT | Facility: CLINIC | Age: 58
End: 2018-08-22

## 2018-08-22 LAB — PAP SMEAR - HIM PATIENT REPORTED: NEGATIVE

## 2018-08-24 ENCOUNTER — TELEPHONE (OUTPATIENT)
Dept: FAMILY MEDICINE | Facility: CLINIC | Age: 58
End: 2018-08-24

## 2018-08-24 ENCOUNTER — OFFICE VISIT (OUTPATIENT)
Dept: FAMILY MEDICINE | Facility: CLINIC | Age: 58
End: 2018-08-24
Payer: COMMERCIAL

## 2018-08-24 VITALS
OXYGEN SATURATION: 96 % | HEART RATE: 87 BPM | DIASTOLIC BLOOD PRESSURE: 63 MMHG | BODY MASS INDEX: 33.68 KG/M2 | HEIGHT: 62 IN | WEIGHT: 183 LBS | SYSTOLIC BLOOD PRESSURE: 100 MMHG | TEMPERATURE: 99.2 F

## 2018-08-24 DIAGNOSIS — M89.9 DISORDER OF BONE AND CARTILAGE: ICD-10-CM

## 2018-08-24 DIAGNOSIS — K51.919 ULCERATIVE COLITIS WITH COMPLICATION, UNSPECIFIED LOCATION (H): ICD-10-CM

## 2018-08-24 DIAGNOSIS — M94.9 DISORDER OF BONE AND CARTILAGE: ICD-10-CM

## 2018-08-24 DIAGNOSIS — F41.8 SITUATIONAL ANXIETY: Primary | ICD-10-CM

## 2018-08-24 DIAGNOSIS — B00.1 COLD SORE: ICD-10-CM

## 2018-08-24 DIAGNOSIS — K22.9 DISORDER OF ESOPHAGUS: ICD-10-CM

## 2018-08-24 PROCEDURE — 99214 OFFICE O/P EST MOD 30 MIN: CPT | Performed by: INTERNAL MEDICINE

## 2018-08-24 RX ORDER — ACYCLOVIR 50 MG/G
CREAM TOPICAL
Qty: 5 G | Refills: 3 | Status: SHIPPED | OUTPATIENT
Start: 2018-08-24 | End: 2023-02-17

## 2018-08-24 RX ORDER — DESVENLAFAXINE 25 MG/1
25 TABLET, EXTENDED RELEASE ORAL DAILY
Qty: 31 TABLET | Refills: 3 | Status: SHIPPED | OUTPATIENT
Start: 2018-08-24 | End: 2018-12-26

## 2018-08-24 RX ORDER — CHLORDIAZEPOXIDE HYDROCHLORIDE AND CLIDINIUM BROMIDE 5; 2.5 MG/1; MG/1
CAPSULE ORAL
Qty: 30 CAPSULE | Refills: 12 | Status: SHIPPED | OUTPATIENT
Start: 2018-08-24 | End: 2018-08-24

## 2018-08-24 RX ORDER — CHLORDIAZEPOXIDE HYDROCHLORIDE AND CLIDINIUM BROMIDE 5; 2.5 MG/1; MG/1
CAPSULE ORAL
Qty: 30 CAPSULE | Refills: 12 | Status: SHIPPED | OUTPATIENT
Start: 2018-08-24 | End: 2020-03-03

## 2018-08-24 ASSESSMENT — ANXIETY QUESTIONNAIRES
IF YOU CHECKED OFF ANY PROBLEMS ON THIS QUESTIONNAIRE, HOW DIFFICULT HAVE THESE PROBLEMS MADE IT FOR YOU TO DO YOUR WORK, TAKE CARE OF THINGS AT HOME, OR GET ALONG WITH OTHER PEOPLE: SOMEWHAT DIFFICULT
1. FEELING NERVOUS, ANXIOUS, OR ON EDGE: NEARLY EVERY DAY
2. NOT BEING ABLE TO STOP OR CONTROL WORRYING: MORE THAN HALF THE DAYS
GAD7 TOTAL SCORE: 16
7. FEELING AFRAID AS IF SOMETHING AWFUL MIGHT HAPPEN: MORE THAN HALF THE DAYS
3. WORRYING TOO MUCH ABOUT DIFFERENT THINGS: NEARLY EVERY DAY
5. BEING SO RESTLESS THAT IT IS HARD TO SIT STILL: SEVERAL DAYS
6. BECOMING EASILY ANNOYED OR IRRITABLE: NEARLY EVERY DAY

## 2018-08-24 ASSESSMENT — PATIENT HEALTH QUESTIONNAIRE - PHQ9: 5. POOR APPETITE OR OVEREATING: MORE THAN HALF THE DAYS

## 2018-08-24 NOTE — MR AVS SNAPSHOT
"              After Visit Summary   8/24/2018    John Steven    MRN: [id number]           Patient Information     Date Of Birth          1960        Visit Information        Provider Department      8/24/2018 3:30 PM Rianna Sim MD [facility]        Today's Diagnoses     Situational anxiety    -  1      Care Instructions      It's normal to feel anxious from time to time, especially if your life is stressful. However, excessive, ongoing anxiety and worry that interfere with day-to-day activities may be a sign of generalized anxiety disorder.  It's possible to develop generalized anxiety disorder as a child or an adult. Generalized anxiety disorder has symptoms that are similar to panic disorder, obsessive-compulsive disorder and other types of anxiety, but they're all different conditions.  Living with generalized anxiety disorder can be a long-term challenge. In many cases, it occurs along with other anxiety or mood disorders. In most cases, generalized anxiety disorder improves with medications or talk therapy (psychotherapy). Making lifestyle changes, learning coping skills and using relaxation techniques also can help.  Generalized anxiety disorder symptoms can vary. They may include:  Persistent worrying or obsession about small or large concerns that's out of proportion to the impact of the event   Inability to set aside or let go of a worry   Inability to relax, restlessness, and feeling keyed up or on edge   Difficulty concentrating, or the feeling that your mind \"goes blank\"   Worrying about excessively worrying   Distress about making decisions for fear of making the wrong decision   Carrying every option in a situation all the way out to its possible negative conclusion   Difficulty handling uncertainty or indecisiveness  Physical signs and symptoms may include:  Fatigue   Irritability   Muscle tension or muscle aches   Trembling, feeling twitchy   Being easily startled   Trouble " sleeping   Sweating   Nausea, diarrhea or irritable bowel syndrome   Headaches  There may be times when your worries don't completely consume you, but you still feel anxious even when there's no apparent reason. For example, you may feel intense worry about your safety or that of your loved ones, or you may have a general sense that something bad is about to happen.  Your anxiety, worry or physical symptoms cause you significant distress in social, work or other areas of your life. Worries can shift from one concern to another and may change with time and age.  Symptoms in children and teenagers  In addition to the symptoms above, children and teenagers who have generalized anxiety disorder may have excessive worries about:  Performance at school or sporting events   Being on time (punctuality)   Earthquakes, nuclear war or other catastrophic events  A child or teen with generalized anxiety disorder may also:  Feel overly anxious to fit in   Be a perfectionist   Redo tasks because they aren't perfect the first time   Spend excessive time doing homework   Lack confidence   Strive for approval   Require a lot of reassurance about performance  When to see a doctor  Some anxiety is normal, but see your doctor if:  You feel like you're worrying too much, and it's interfering with your work, relationships or other parts of your life   You feel depressed, have trouble with drinking or drugs, or you have other mental health concerns along with anxiety   You have suicidal thoughts or behaviors -- seek emergency treatment immediately  Your worries are unlikely to simply go away on their own, and they may actually get worse over time. Try to seek professional help before your anxiety becomes severe -- it may be easier to treat early on.  As with many mental health conditions, the exact cause of generalized anxiety disorder isn't fully understood, but it may include genetics as well as other risk factors.  These factors may  increase the risk of developing generalized anxiety disorder:  Personality. A person whose temperament is timid or negative or who avoids anything dangerous may be more prone to generalized anxiety disorder than others are.   Genetics. Generalized anxiety disorder may run in families.   Being female. Women are diagnosed with generalized anxiety disorder somewhat more often than men are.  Having generalized anxiety disorder does more than just make you worry. It can:  Impair your ability to perform tasks quickly and efficiently because you have trouble concentrating   Take your time and focus from other activities   Sap your energy   Disturb your sleep  Generalized anxiety disorder can also lead to or worsen other mental and physical health conditions, such as:   Depression (which often occurs with generalized anxiety disorder)   Substance abuse   Trouble sleeping (insomnia)   Digestive or bowel problems   Headaches   Heart-health issues  You may start by seeing your family doctor. However, you may need to see a psychiatrist or psychologist if you have severe anxiety or if you also have another mental health condition, such as depression.  What you can do  To prepare for your appointment, make a list of:  Your symptoms, including when they occur, what seems to make them better or worse, and how much they affect your day-to-day activities, such as work, school or relationships   What's causing you stress, including major life changes or stressful events you've dealt with recently and any traumatic experiences you've had in the past   Health problems you have, both physical conditions and mental health issues   All medications you're taking, including any vitamins or other supplements, and the dosages   Questions to ask your doctor  Some basic questions to ask your doctor include:  What's the most likely cause of my symptoms?   Are there other possible situations, psychological issues or physical health problems that  could be causing or worsening my anxiety?   Do I need any tests?   What treatment do you recommend?   Should I see a psychiatrist, psychologist or other mental health provider?   Would medication help? If so, is there a generic alternative to the medicine you're prescribing?   Are there any brochures or other printed material that I can have? What websites do you recommend?  Don't hesitate to ask questions at any time during your appointment.  What to expect from your doctor  Being ready to answer questions from your doctor may reserve time to go over any points you want to spend more time on. Some questions the doctor may ask include:  What are your symptoms?   Do your symptoms interfere with your daily activities?   Have your feelings of anxiety been occasional or continuous?   When did you first begin noticing your anxiety?   Does anything in particular seem to trigger your anxiety or make it worse?   What, if anything, seems to improve your feelings of anxiety?   What, if any, physical or mental health conditions do you have?   What traumatic experiences have you had recently or in the past?   Do you regularly drink alcohol or use recreational drugs?   Do you have any blood relatives with anxiety or other mental health conditions, such as depression?  To help diagnose generalized anxiety disorder, your health provider may:  Do a physical exam to look for signs that your anxiety might be linked to an underlying medical condition   Order blood or urine tests or other tests, if a medical condition is suspected   Ask detailed questions about your symptoms and medical history   Use psychological questionnaires to help determine a diagnosis  Many experts use the criteria listed in the Diagnostic and Statistical Manual of Mental Disorders (DSM-5), published by the American Psychiatric Association, to diagnose mental conditions. This manual is also used by insurance companies to reimburse for treatment.  DSM-5 criteria  for generalized anxiety disorder include:  Excessive anxiety and worry about several events or activities most days of the week for at least six months   Difficulty controlling your feelings of worry   At least three of the following symptoms in adults and one of the following in children: restlessness, fatigue, trouble concentrating, irritability, muscle tension or sleep problems   Anxiety or worry that causes you significant distress or interferes with your daily life   Anxiety that isn't related to another mental health condition, such as panic attacks or post-traumatic stress disorder (PTSD), substance abuse, or a medical condition  Generalized anxiety disorder often occurs along with other mental health problems, which can make diagnosis and treatment more challenging. Some disorders that commonly occur with generalized anxiety disorder include:  Phobias   Panic disorder   Depression   Substance abuse   PTSD  The two main treatments for generalized anxiety disorder are psychotherapy and medications. You may benefit most from a combination of the two. It may take some trial and error to discover which treatments work best for you.  Psychotherapy  Also known as talk therapy or psychological counseling, psychotherapy involves working with a therapist to reduce your anxiety symptoms. It can be an effective treatment for generalized anxiety disorder.  Cognitive behavioral therapy is one of the most effective forms of psychotherapy for generalized anxiety disorder. Generally a short-term treatment, cognitive behavioral therapy focuses on teaching you specific skills to gradually return to the activities you've avoided because of anxiety. Through this process, your symptoms improve as you build on your initial success.  Medications  Several types of medications are used to treat generalized anxiety disorder, including those below. Talk with your doctor about benefits, risks and possible side effects.  Antidepressants.  Antidepressants, including medications in the selective serotonin reuptake inhibito r  and serotonin norepinephrine reuptake inhibitor (SNRI) classes, are the first-line medication treatments. Examples of antidepressants used to treat anxiety disorders include escitalopram (Lexapro), duloxetine (Cymbalta), venlafaxine (Effexor XR) and paroxetine (Paxil, Pexeva). Your doctor also may recommend other antidepressants.   Buspirone. An anti-anxiety medication called buspirone may be used on an ongoing basis. As with most antidepressants, it typically takes up to several weeks to become fully effective.   Benzodiazepines. In limited circumstances, your doctor may prescribe one of these sedatives for relief of anxiety symptoms. Examples include alprazolam (Niravam, Xanax), chlordiazepoxide (Librium), diazepam (Valium) and lorazepam (Ativan). Benzodiazepines are generally used only for relieving acute anxiety on a short-term basis. Because they can be habit-forming, these medications aren't a good choice if you've had problems with alcohol or drug abuse.  While most people with anxiety disorders need psychotherapy or medications to get anxiety under control, lifestyle changes also can make a difference. Here's what you can do:  Keep physically active. Develop a routine so that you're physically active most days of the week. Exercise is a powerful stress reducer. It may improve your mood and help you stay healthy. Start out slowly and gradually increase the amount and intensity of your activities.   Avoid alcohol and other sedatives. These substances can worsen anxiety.   Quit smoking and cut back or quit drinking coffee. Both nicotine and caffeine can worsen anxiety.   Use relaxation techniques. Visualization techniques, meditation and yoga are examples of relaxation techniques that can ease anxiety.   Make sleep a priority. Do what you can to make sure you're getting enough sleep to feel rested. If you aren't sleeping  well, see your doctor.   Eat healthy. Healthy eating -- such as focusing on vegetables, fruits, whole grains and fish -- may be linked to reduced anxiety, but more research is needed.  Some people are interested in trying alternative medicine (a nonconventional approach instead of conventional medicine) or complementary medicine (a nonconventional approach used along with conventional medicine).  Several herbal remedies have been studied as a treatment for anxiety, such as those listed below, but more research is needed to fully understand the risks and benefits. Here's what researchers know -- and don't know:  Kava. Kava appeared to be a promising treatment for anxiety, but reports of serious liver damage -- even with short-term use -- caused several  countries and [place] to pull it off the market. The Food and Drug Administration has issued warnings but not banned sales in the [place]. Avoid using kava until more rigorous safety studies are done, especially if you have liver problems or take medications that affect your liver.   Valerian. In some studies, people who used valerian reported less anxiety and stress, but in other studies, people reported no benefit. Discuss valerian with your doctor before trying it. While it's generally well-tolerated, there are a few case reports of people developing liver problems when taking preparations containing valerian. If you've been using valerian for a long time and want to stop using it, many authorities recommend that its use be tapered down to prevent withdrawal symptoms.   Passionflower. A few small clinical trials suggest that passionflower might help with anxiety. In many commercial products, passionflower is combined with other herbs, making it difficult to distinguish the unique qualities of each herb. Passionflower is generally considered safe when taken as directed, but some studies have found it can cause drowsiness, dizziness and confusion.    Theanine. This amino acid is found in green tea and may be found in some supplements. Preliminary evidence shows that theanine may make some people feel calmer, but there is limited evidence that it helps treat anxiety.  Before taking herbal remedies or supplements, talk to your doctor to make sure they're safe for you and won't interact with any medications you take.  To cope with generalized anxiety disorder, here's what you can do:  Stick to your treatment plan. Take medications as directed. Keep therapy appointments. Consistency can make a big difference, especially when it comes to taking your medication.   Join an anxiety support group. Here, you can find compassion, understanding and shared experiences. You may find support groups in your community or on the Internet.   Take action. Work with your mental health provider to figure out what's making you anxious and address it.   Let it go. Don't dwell on past concerns. Change what you can and let the rest take its course.   Break the cycle. When you feel anxious, take a brisk walk or delve into a hobby to refocus your mind away from your worries.   Socialize. Don't let worries isolate you from loved ones or enjoyable activities. Social interaction and caring relationships can lessen your worries.  There's no way to predict for certain what will cause someone to develop generalized anxiety disorder, but you can take steps to reduce the impact of symptoms if you experience anxiety:  Get help early. Anxiety, like many other mental health conditions, can be harder to treat if you wait.   Keep a journal. Keeping track of your personal life can help you and your mental health provider identify what's causing you stress and what seems to help you feel better.   Prioritize issues in your life. You can reduce anxiety by carefully managing your time and energy.   Avoid unhealthy substance use. Alcohol and drug use and even caffeine or nicotine use can cause or worsen  anxiety. If you're addicted to any of these substances, quitting can make you anxious. If you can't quit on your own, see your doctor or find a treatment program or support group to help you.            Follow-ups after your visit        Your next 10 appointments already scheduled     Oct 05, 2018  7:30 AM CDT   Office Visit with Rianna Sim MD   [facility]    [address] [facility] [address] [phone]           Bring a current list of meds and any records pertaining to this visit. For Physicals, please bring immunization records and any forms needing to be filled out. Please arrive 10 minutes early to complete paperwork.              Who to contact     If you have questions or need follow up information about today's clinic visit or your schedule please contact [facility] directly at [phone].  Normal or non-critical lab and imaging results will be communicated to you by MyChart, letter or phone within 4 business days after the clinic has received the results. If you do not hear from us within 7 days, please contact the clinic through [employer] or phone. If you have a critical or abnormal lab result, we will notify you by phone as soon as possible.  Submit refill requests through [facility] [facility] or call your pharmacy and they will forward the refill request to us. Please allow 3 business days for your refill to be completed.          Additional Information About Your Visit        MyChart Information     [facility] gives you secure access to your electronic health record. If you see a primary care provider, you can also send messages to your care team and make appointments. If you have questions, please call your primary care clinic.  If you do not have a primary care provider, please call [phone] and they will assist you.        Care EveryWhere ID     This is your Care EveryWhere ID. This could be used by other organizations to access your [place]  "medical records  [id number]        Your Vitals Were     Pulse Temperature Height Pulse Oximetry Breastfeeding? BMI (Body Mass Index)    87 99.2  F (37.3  C) (Tympanic) 5' 1.8\" (1.57 m) 96% No 33.69 kg/m2       Blood Pressure from Last 3 Encounters:   08/24/18 100/63   08/10/18 99/61   06/23/17 100/60    Weight from Last 3 Encounters:   08/24/18 183 lb (83 kg)   08/10/18 183 lb (83 kg)   06/23/17 183 lb (83 kg)              Today, you had the following     No orders found for display         Today's Medication Changes          These changes are accurate as of 8/24/18  3:58 PM.  If you have any questions, ask your nurse or doctor.               Start taking these medicines.        Dose/Directions    desvenlafaxine succinate 25 MG 24 hr tablet   Commonly known as:  PRISTIQ   Used for:  Situational anxiety   Started by:  Rianna Sim MD        Dose:  25 mg   Take 1 tablet (25 mg) by mouth daily   Quantity:  31 tablet   Refills:  3            Where to get your medicines      These medications were sent to [facility] [address] [place] [address] [address] [facility] [address] [facility] [address]     Phone:  [phone]     desvenlafaxine succinate 25 MG 24 hr tablet                Primary Care Provider Office Phone # Fax #    Rianna Sim [phone] [address] [address]        Equal Access to Services     [facility] Center[facility] AH: Hadii marylu cristina hadamieo Sodominguez, waaxda luqadaha, qaybta kaalmada cathy, lavon idisheldon muñoz. So [facility] [phone].    ATENCIÓN: Si habla español, tiene a bonilla disposición servicios gratuitos de asistencia lingüística. Llame al [phone].    We comply with applicable federal civil rights laws and [place] laws. We do not discriminate on the basis of race, color, national origin, age, disability, sex, sexual orientation, or gender identity.            Thank you!     Thank you for " choosing [facility]  for your care. Our goal is always to provide you with excellent care. Hearing back from our patients is one way we can continue to improve our services. Please take a few minutes to complete the written survey that you may receive in the mail after your visit with us. Thank you!             Your Updated Medication List - Protect others around you: Learn how to safely use, store and throw away your medicines at www.disposemymeds.org.          This list is accurate as of 8/24/18  3:58 PM.  Always use your most recent med list.                   Brand Name Dispense Instructions for use Diagnosis    acyclovir 5 % cream    ZOVIRAX    5 g    Apply topically 5 times daily Apply to cold sores/fever blisters    Herpes simplex without mention of complication       * albuterol 108 (90 Base) MCG/ACT inhaler    PROAIR HFA/PROVENTIL HFA/VENTOLIN HFA     Inhale 2 puffs into the lungs every 6 hours as needed for shortness of breath / dyspnea or wheezing        * albuterol 108 (90 Base) MCG/ACT Aepb inhaler    PROAIR RESPICLICK    1 each    Inhale 2 puffs into the lungs every 6 hours as needed for shortness of breath / dyspnea or wheezing    Allergic state, sequela       ALPRAZolam 0.25 MG tablet    XANAX    45 tablet    TAKE 2 TABLETS BY MOUTH THREE TIMES DAILY AS NEEDED FOR ANXIETY    Dizziness and giddiness, Stress       ANUCORT-HC 25 MG Suppository   Generic drug:  hydrocortisone      UNW AND I 1 SUP REC  Q EVENING FOR 2 WEEKS        CANASA 1000 MG Suppository   Generic drug:  mesalamine      UNW AND I 1 SUP REC QD PRN        cetirizine 10 MG tablet    zyrTEC     Take 10 mg by mouth daily        clidinium-chlordiazePOXIDE 5-2.5 MG Caps per capsule    LIBRAX    30 capsule    TAKE ONE CAPSULE BY MOUTH DAILY AS NEEDED    Ulcerative colitis with complication, unspecified location (H), Disorder of esophagus       CORTIFOAM 10 % rectal foam   Generic drug:  hydrocortisone      JESSICA REC BID FOR 14 DAYS         cyclobenzaprine 10 MG tablet    FLEXERIL    30 tablet    TAKE 1/2 TO 1 TABLET(5 TO 10 MG) BY MOUTH EVERY 8 HOURS AS NEEDED FOR MUSCLE SPASMS    Chronic midline low back pain without sciatica       desvenlafaxine succinate 25 MG 24 hr tablet    PRISTIQ    31 tablet    Take 1 tablet (25 mg) by mouth daily    Situational anxiety       diphenhydrAMINE 25 MG capsule    BENADRYL     Take 1 capsule by mouth nightly as needed for itching. Post nasal drip causing esophageal spasms        folic acid 1 MG tablet    FOLVITE    90 tablet    TAKE 1 TABLET BY MOUTH EVERY DAY.    Ulcerative colitis with complication, unspecified ulcerative colitis       hydrocortisone 100 MG/60ML enema    CORTENEMA     PLACE 1 ENEMA  BY RECTAL ROUTE QD FOR 2 WEEKS HS.        LEVSIN/SL 0.125 MG Subl   Generic drug:  Hyoscyamine Sulfate SL     30    1 TAB 4 TIMES DAILY, BEFORE MEALS AND AT BEDTIME    Unspecified disorder of esophagus       methotrexate 2.5 MG tablet CHEMO           metroNIDAZOLE 1 % gel    METROGEL    30 g    Apply  topically 2 times daily.    Rosacea       NIFEdipine ER osmotic 30 MG 24 hr tablet    NIFEDICAL XL    90 tablet    Take 1 tablet (30 mg) by mouth daily    Gastroesophageal reflux disease with esophagitis       * order for DME     1 each    Equipment being ordered:  Compression pump Flexitouch    Edema, unspecified edema       * order for DME     2 each    Equipment being ordered: Compression stockings 20-30 mm pressure    Edema       order for DME     2 each    Equipment being ordered: Medium compression stockings  20-30 mm    Localized edema       progesterone 200 MG capsule    PROMETRIUM          psyllium 0.52 g capsule      Take 1 capsule by mouth daily        REMICADE 100 MG injection   Generic drug:  inFLIXimab           sulfaSALAzine  MG EC tablet    AZULFIDINE EN    240 tablet    TAKE 4 TABLETS BY MOUTH TWICE DAILY    Ulcerative colitis with complication, unspecified location (H)       vitamin D 32032  UNIT capsule    ERGOCALCIFEROL          * Notice:  This list has 4 medication(s) that are the same as other medications prescribed for you. Read the directions carefully, and ask your doctor or other care provider to review them with you.

## 2018-08-24 NOTE — TELEPHONE ENCOUNTER
Received alert of e-scribing error for med below  But then noted Rx has been hand delivered to pharmacy per other tele encounter    Felisa CARLIN RN

## 2018-08-24 NOTE — TELEPHONE ENCOUNTER
Message        An error occurred while processing the e-prescribing message.       The message was not sent electronically to the requested pharmacy. Contact the pharmacy about the new prescription.       Failed to send message.       Send return value: -1       Error Detail: 900-Controlled substance must have a DEASchedule populated in Medication P         clidinium-chlordiazePOXIDE (LIBRAX) 5-2.5 MG CAPS per capsule   Medication    Date: 2018   Department: [facility]   Ordering/Authorizing: Rianna Sim MD         Pharmacy      [facility] [address] [place] [address] [address] [facility] [address] [facility] [address]     Phone: [phone] Fax: [phone]     Not a 24 hour pharmacy; exact hours not known       Patient Demographics      Patient Name Sex  SSN Address Phone     John Steven Female 1960 [id number]  [facility] [address] [phone] (Home)  [phone] (Mobile) *Preferred*       Medication Detail         Disp Refills Start End        clidinium-chlordiazePOXIDE (LIBRAX) 5-2.5 MG CAPS per capsule 30 capsule 12 2018       Sig: TAKE ONE CAPSULE BY MOUTH DAILY AS NEEDED      Class: E-Prescribe      E-Prescribing Status: Transmission to pharmacy failed (2018  4:01 PM CDT)        Order Providers      Prescribing Provider Encounter Provider     Rianna Sim MD Kaur, Bhavjot, MD       Associated Diagnoses      Ulcerative colitis with complication, unspecified location (H)

## 2018-08-24 NOTE — PROGRESS NOTES
SUBJECTIVE:   John Steven is a 58 year old female who presents to clinic today for the following health issues:      Abnormal Mood Symptoms      Duration: Chronic issue - but sx became more concerning the past year    Description:  Depression: YES-  A little bit  Anxiety: YES  Panic attacks: no      Accompanying signs and symptoms: see PHQ-9 and HUNTER scores    History (similar episodes/previous evaluation): Chronic issue - family, friends and co-workers have commented since October of last year    Precipitating or alleviating factors: None    Therapies tried and outcome: Xanax rarely - uses for flying more.     Patient has colitis and esophageal issues  She was recently here and felt that the visit made her think       I discussed various treatment options with her  Problem list and histories reviewed & adjusted, as indicated.  Additional history: as documented    Patient Active Problem List   Diagnosis     Other specified disorder of rectum and anus     Ulcerative colitis (H)     Other urethritis(597.89)     Other and unspecified hormones and synthetic substitutes causing adverse effect in therapeutic use     Allergic rhinitis     Disorder of esophagus     Rosacea     Dizziness and giddiness     Obesity     Immunocompromised (H)     Past Surgical History:   Procedure Laterality Date     BIOPSY      BREAST     C NONSPECIFIC PROCEDURE  2003    Heller myotomy     C NONSPECIFIC PROCEDURE      EB for spotting,fibroid     C NONSPECIFIC PROCEDURE  2007    rt breast biopsy     DILATION AND CURETTAGE, HYSTEROSCOPY DIAGNOSTIC, COMBINED N/A 8/24/2015    Procedure: COMBINED DILATION AND CURETTAGE, HYSTEROSCOPY DIAGNOSTIC;  Surgeon: Chelle Rebollar MD;  Location: [facility]     GI SURGERY       GYN SURGERY       HC COLONOSCOPY THRU STOMA, DIAGNOSTIC  4/06       Social History   Substance Use Topics     Smoking status: Former Smoker     Packs/day: 1.00     Years: 10.00     Types: Cigarettes     Quit date: 1/1/1989      Smokeless tobacco: Never Used     Alcohol use 0.0 oz/week     0 Standard drinks or equivalent per week      Comment: 2-3 drinks  times a week     Family History   Problem Relation Age of Onset     Cerebrovascular Disease Mother      80     Diabetes Mother      Breast Cancer Mother      60     Blood Disease Mother      merkel cell     GASTROINTESTINAL DISEASE Brother      Collagenous colitis     Asthma Son      Diabetes Maternal Grandmother          Current Outpatient Prescriptions   Medication Sig Dispense Refill     acyclovir (ZOVIRAX) 5 % cream Apply topically 5 times daily Apply to cold sores/fever blisters 5 g 3     albuterol (PROAIR HFA, PROVENTIL HFA, VENTOLIN HFA) 108 (90 BASE) MCG/ACT inhaler Inhale 2 puffs into the lungs every 6 hours as needed for shortness of breath / dyspnea or wheezing       albuterol (PROAIR RESPICLICK) 108 (90 Base) MCG/ACT AEPB inhaler Inhale 2 puffs into the lungs every 6 hours as needed for shortness of breath / dyspnea or wheezing 1 each 3     ALPRAZolam (XANAX) 0.25 MG tablet TAKE 2 TABLETS BY MOUTH THREE TIMES DAILY AS NEEDED FOR ANXIETY 45 tablet 1     ANUCORT-HC 25 MG Suppository UNW AND I 1 SUP REC  Q EVENING FOR 2 WEEKS  1     CANASA 1000 MG Suppository UNW AND I 1 SUP REC QD PRN  10     cetirizine (ZYRTEC) 10 MG tablet Take 10 mg by mouth daily       clidinium-chlordiazePOXIDE (LIBRAX) 5-2.5 MG CAPS per capsule TAKE ONE CAPSULE BY MOUTH DAILY AS NEEDED 30 capsule 12     CORTIFOAM 10 % rectal foam JESSICA REC BID FOR 14 DAYS  0     cyclobenzaprine (FLEXERIL) 10 MG tablet TAKE 1/2 TO 1 TABLET(5 TO 10 MG) BY MOUTH EVERY 8 HOURS AS NEEDED FOR MUSCLE SPASMS 30 tablet 3     desvenlafaxine succinate (PRISTIQ) 25 MG 24 hr tablet Take 1 tablet (25 mg) by mouth daily 31 tablet 3     diphenhydrAMINE (BENADRYL) 25 MG capsule Take 1 capsule by mouth nightly as needed for itching. Post nasal drip causing esophageal spasms       folic acid (FOLVITE) 1 MG tablet TAKE 1 TABLET BY MOUTH EVERY  "DAY. 90 tablet 3     hydrocortisone (CORTENEMA) 100 MG/60ML enema PLACE 1 ENEMA  BY RECTAL ROUTE QD FOR 2 WEEKS HS.  1     LEVSIN/SL 0.125 MG SL SUBL 1 TAB 4 TIMES DAILY, BEFORE MEALS AND AT BEDTIME 30 12     methotrexate 2.5 MG tablet CHEMO        metroNIDAZOLE (METROGEL) 1 % gel Apply  topically 2 times daily. 30 g 12     NIFEdipine ER osmotic (NIFEDICAL XL) 30 MG 24 hr tablet Take 1 tablet (30 mg) by mouth daily 90 tablet 0     order for DME Equipment being ordered: Medium compression stockings   20-30 mm 2 each 1     order for DME Equipment being ordered: Compression stockings  20-30 mm pressure 2 each 3     order for DME Equipment being ordered:  Compression pump  Flexitouch 1 each 0     progesterone (PROMETRIUM) 200 MG capsule        psyllium 0.52 G capsule Take 1 capsule by mouth daily       REMICADE 100 MG injection        sulfaSALAzine ER (AZULFIDINE EN) 500 MG EC tablet TAKE 4 TABLETS BY MOUTH TWICE DAILY 240 tablet 2     vitamin D (ERGOCALCIFEROL) 17069 UNIT capsule          Reviewed and updated as needed this visit by clinical staff  Tobacco  Allergies  Meds  Problems       Reviewed and updated as needed this visit by Provider  Allergies  Meds  Problems         ROS:  Constitutional, HEENT, cardiovascular, pulmonary, GI, , musculoskeletal, neuro, skin, endocrine and psych systems are negative, except as otherwise noted.    OBJECTIVE:     /63 (BP Location: Left arm, Cuff Size: Adult Large)  Pulse 87  Temp 99.2  F (37.3  C) (Tympanic)  Ht 5' 1.8\" (1.57 m)  Wt 183 lb (83 kg)  SpO2 96%  Breastfeeding? No  BMI 33.69 kg/m2  Body mass index is 33.69 kg/(m^2).  PSYCH: mentation appears normal and affect normal/bright  MENTAL STATUS EXAM:  Appearance/Behavior: No apparent distress  Speech: Normal  Mood/Affect: depressed affect and anxiety  Insight: Adequate        ASSESSMENT/PLAN:             John was seen today for anxiety.    Diagnoses and all orders for this visit:    Situational " anxiety  -     desvenlafaxine succinate (PRISTIQ) 25 MG 24 hr tablet; Take 1 tablet (25 mg) by mouth daily  I discussed various treatment options with the patient  She does not want to take a drug which causes diarrhea  Ulcerative colitis with complication, unspecified location (H)  -     clidinium-chlordiazePOXIDE (LIBRAX) 5-2.5 MG CAPS per capsule; TAKE ONE CAPSULE BY MOUTH DAILY AS NEEDED  I discussed with her about her risk of DVT and cardiovascular disease and need for exercise  Disorder of esophagus  -     clidinium-chlordiazePOXIDE (LIBRAX) 5-2.5 MG CAPS per capsule; TAKE ONE CAPSULE BY MOUTH DAILY AS NEEDED    Cold sore  -     acyclovir (ZOVIRAX) 5 % cream; Apply topically 5 times daily Apply to cold sores/fever blisters      Dexa scan should also be done because of her ulcerative colitis and risk of osteoporosis    Rianna Sim MD  [facility]

## 2018-08-24 NOTE — PATIENT INSTRUCTIONS
"  It's normal to feel anxious from time to time, especially if your life is stressful. However, excessive, ongoing anxiety and worry that interfere with day-to-day activities may be a sign of generalized anxiety disorder.  It's possible to develop generalized anxiety disorder as a child or an adult. Generalized anxiety disorder has symptoms that are similar to panic disorder, obsessive-compulsive disorder and other types of anxiety, but they're all different conditions.  Living with generalized anxiety disorder can be a long-term challenge. In many cases, it occurs along with other anxiety or mood disorders. In most cases, generalized anxiety disorder improves with medications or talk therapy (psychotherapy). Making lifestyle changes, learning coping skills and using relaxation techniques also can help.  Generalized anxiety disorder symptoms can vary. They may include:  Persistent worrying or obsession about small or large concerns that's out of proportion to the impact of the event   Inability to set aside or let go of a worry   Inability to relax, restlessness, and feeling keyed up or on edge   Difficulty concentrating, or the feeling that your mind \"goes blank\"   Worrying about excessively worrying   Distress about making decisions for fear of making the wrong decision   Carrying every option in a situation all the way out to its possible negative conclusion   Difficulty handling uncertainty or indecisiveness  Physical signs and symptoms may include:  Fatigue   Irritability   Muscle tension or muscle aches   Trembling, feeling twitchy   Being easily startled   Trouble sleeping   Sweating   Nausea, diarrhea or irritable bowel syndrome   Headaches  There may be times when your worries don't completely consume you, but you still feel anxious even when there's no apparent reason. For example, you may feel intense worry about your safety or that of your loved ones, or you may have a general sense that something bad is " about to happen.  Your anxiety, worry or physical symptoms cause you significant distress in social, work or other areas of your life. Worries can shift from one concern to another and may change with time and age.  Symptoms in children and teenagers  In addition to the symptoms above, children and teenagers who have generalized anxiety disorder may have excessive worries about:  Performance at school or sporting events   Being on time (punctuality)   Earthquakes, nuclear war or other catastrophic events  A child or teen with generalized anxiety disorder may also:  Feel overly anxious to fit in   Be a perfectionist   Redo tasks because they aren't perfect the first time   Spend excessive time doing homework   Lack confidence   Strive for approval   Require a lot of reassurance about performance  When to see a doctor  Some anxiety is normal, but see your doctor if:  You feel like you're worrying too much, and it's interfering with your work, relationships or other parts of your life   You feel depressed, have trouble with drinking or drugs, or you have other mental health concerns along with anxiety   You have suicidal thoughts or behaviors -- seek emergency treatment immediately  Your worries are unlikely to simply go away on their own, and they may actually get worse over time. Try to seek professional help before your anxiety becomes severe -- it may be easier to treat early on.  As with many mental health conditions, the exact cause of generalized anxiety disorder isn't fully understood, but it may include genetics as well as other risk factors.  These factors may increase the risk of developing generalized anxiety disorder:  Personality. A person whose temperament is timid or negative or who avoids anything dangerous may be more prone to generalized anxiety disorder than others are.   Genetics. Generalized anxiety disorder may run in families.   Being female. Women are diagnosed with generalized anxiety disorder  somewhat more often than men are.  Having generalized anxiety disorder does more than just make you worry. It can:  Impair your ability to perform tasks quickly and efficiently because you have trouble concentrating   Take your time and focus from other activities   Sap your energy   Disturb your sleep  Generalized anxiety disorder can also lead to or worsen other mental and physical health conditions, such as:   Depression (which often occurs with generalized anxiety disorder)   Substance abuse   Trouble sleeping (insomnia)   Digestive or bowel problems   Headaches   Heart-health issues  You may start by seeing your family doctor. However, you may need to see a psychiatrist or psychologist if you have severe anxiety or if you also have another mental health condition, such as depression.  What you can do  To prepare for your appointment, make a list of:  Your symptoms, including when they occur, what seems to make them better or worse, and how much they affect your day-to-day activities, such as work, school or relationships   What's causing you stress, including major life changes or stressful events you've dealt with recently and any traumatic experiences you've had in the past   Health problems you have, both physical conditions and mental health issues   All medications you're taking, including any vitamins or other supplements, and the dosages   Questions to ask your doctor  Some basic questions to ask your doctor include:  What's the most likely cause of my symptoms?   Are there other possible situations, psychological issues or physical health problems that could be causing or worsening my anxiety?   Do I need any tests?   What treatment do you recommend?   Should I see a psychiatrist, psychologist or other mental health provider?   Would medication help? If so, is there a generic alternative to the medicine you're prescribing?   Are there any brochures or other printed material that I can have? What  websites do you recommend?  Don't hesitate to ask questions at any time during your appointment.  What to expect from your doctor  Being ready to answer questions from your doctor may reserve time to go over any points you want to spend more time on. Some questions the doctor may ask include:  What are your symptoms?   Do your symptoms interfere with your daily activities?   Have your feelings of anxiety been occasional or continuous?   When did you first begin noticing your anxiety?   Does anything in particular seem to trigger your anxiety or make it worse?   What, if anything, seems to improve your feelings of anxiety?   What, if any, physical or mental health conditions do you have?   What traumatic experiences have you had recently or in the past?   Do you regularly drink alcohol or use recreational drugs?   Do you have any blood relatives with anxiety or other mental health conditions, such as depression?  To help diagnose generalized anxiety disorder, your health provider may:  Do a physical exam to look for signs that your anxiety might be linked to an underlying medical condition   Order blood or urine tests or other tests, if a medical condition is suspected   Ask detailed questions about your symptoms and medical history   Use psychological questionnaires to help determine a diagnosis  Many experts use the criteria listed in the Diagnostic and Statistical Manual of Mental Disorders (DSM-5), published by the American Psychiatric Association, to diagnose mental conditions. This manual is also used by insurance companies to reimburse for treatment.  DSM-5 criteria for generalized anxiety disorder include:  Excessive anxiety and worry about several events or activities most days of the week for at least six months   Difficulty controlling your feelings of worry   At least three of the following symptoms in adults and one of the following in children: restlessness, fatigue, trouble concentrating, irritability,  muscle tension or sleep problems   Anxiety or worry that causes you significant distress or interferes with your daily life   Anxiety that isn't related to another mental health condition, such as panic attacks or post-traumatic stress disorder (PTSD), substance abuse, or a medical condition  Generalized anxiety disorder often occurs along with other mental health problems, which can make diagnosis and treatment more challenging. Some disorders that commonly occur with generalized anxiety disorder include:  Phobias   Panic disorder   Depression   Substance abuse   PTSD  The two main treatments for generalized anxiety disorder are psychotherapy and medications. You may benefit most from a combination of the two. It may take some trial and error to discover which treatments work best for you.  Psychotherapy  Also known as talk therapy or psychological counseling, psychotherapy involves working with a therapist to reduce your anxiety symptoms. It can be an effective treatment for generalized anxiety disorder.  Cognitive behavioral therapy is one of the most effective forms of psychotherapy for generalized anxiety disorder. Generally a short-term treatment, cognitive behavioral therapy focuses on teaching you specific skills to gradually return to the activities you've avoided because of anxiety. Through this process, your symptoms improve as you build on your initial success.  Medications  Several types of medications are used to treat generalized anxiety disorder, including those below. Talk with your doctor about benefits, risks and possible side effects.  Antidepressants. Antidepressants, including medications in the selective serotonin reuptake inhibito r  and serotonin norepinephrine reuptake inhibitor (SNRI) classes, are the first-line medication treatments. Examples of antidepressants used to treat anxiety disorders include escitalopram (Lexapro), duloxetine (Cymbalta), venlafaxine (Effexor XR) and paroxetine  (Paxil, Pexeva). Your doctor also may recommend other antidepressants.   Buspirone. An anti-anxiety medication called buspirone may be used on an ongoing basis. As with most antidepressants, it typically takes up to several weeks to become fully effective.   Benzodiazepines. In limited circumstances, your doctor may prescribe one of these sedatives for relief of anxiety symptoms. Examples include alprazolam (Niravam, Xanax), chlordiazepoxide (Librium), diazepam (Valium) and lorazepam (Ativan). Benzodiazepines are generally used only for relieving acute anxiety on a short-term basis. Because they can be habit-forming, these medications aren't a good choice if you've had problems with alcohol or drug abuse.  While most people with anxiety disorders need psychotherapy or medications to get anxiety under control, lifestyle changes also can make a difference. Here's what you can do:  Keep physically active. Develop a routine so that you're physically active most days of the week. Exercise is a powerful stress reducer. It may improve your mood and help you stay healthy. Start out slowly and gradually increase the amount and intensity of your activities.   Avoid alcohol and other sedatives. These substances can worsen anxiety.   Quit smoking and cut back or quit drinking coffee. Both nicotine and caffeine can worsen anxiety.   Use relaxation techniques. Visualization techniques, meditation and yoga are examples of relaxation techniques that can ease anxiety.   Make sleep a priority. Do what you can to make sure you're getting enough sleep to feel rested. If you aren't sleeping well, see your doctor.   Eat healthy. Healthy eating -- such as focusing on vegetables, fruits, whole grains and fish -- may be linked to reduced anxiety, but more research is needed.  Some people are interested in trying alternative medicine (a nonconventional approach instead of conventional medicine) or complementary medicine (a nonconventional  approach used along with conventional medicine).  Several herbal remedies have been studied as a treatment for anxiety, such as those listed below, but more research is needed to fully understand the risks and benefits. Here's what researchers know -- and don't know:  Kava. Kava appeared to be a promising treatment for anxiety, but reports of serious liver damage -- even with short-term use -- caused several  countries and [place] to pull it off the market. The Food and Drug Administration has issued warnings but not banned sales in the [place]. Avoid using kava until more rigorous safety studies are done, especially if you have liver problems or take medications that affect your liver.   Valerian. In some studies, people who used valerian reported less anxiety and stress, but in other studies, people reported no benefit. Discuss valerian with your doctor before trying it. While it's generally well-tolerated, there are a few case reports of people developing liver problems when taking preparations containing valerian. If you've been using valerian for a long time and want to stop using it, many authorities recommend that its use be tapered down to prevent withdrawal symptoms.   Passionflower. A few small clinical trials suggest that passionflower might help with anxiety. In many commercial products, passionflower is combined with other herbs, making it difficult to distinguish the unique qualities of each herb. Passionflower is generally considered safe when taken as directed, but some studies have found it can cause drowsiness, dizziness and confusion.   Theanine. This amino acid is found in green tea and may be found in some supplements. Preliminary evidence shows that theanine may make some people feel calmer, but there is limited evidence that it helps treat anxiety.  Before taking herbal remedies or supplements, talk to your doctor to make sure they're safe for you and won't interact with any  medications you take.  To cope with generalized anxiety disorder, here's what you can do:  Stick to your treatment plan. Take medications as directed. Keep therapy appointments. Consistency can make a big difference, especially when it comes to taking your medication.   Join an anxiety support group. Here, you can find compassion, understanding and shared experiences. You may find support groups in your community or on the Internet.   Take action. Work with your mental health provider to figure out what's making you anxious and address it.   Let it go. Don't dwell on past concerns. Change what you can and let the rest take its course.   Break the cycle. When you feel anxious, take a brisk walk or delve into a hobby to refocus your mind away from your worries.   Socialize. Don't let worries isolate you from loved ones or enjoyable activities. Social interaction and caring relationships can lessen your worries.  There's no way to predict for certain what will cause someone to develop generalized anxiety disorder, but you can take steps to reduce the impact of symptoms if you experience anxiety:  Get help early. Anxiety, like many other mental health conditions, can be harder to treat if you wait.   Keep a journal. Keeping track of your personal life can help you and your mental health provider identify what's causing you stress and what seems to help you feel better.   Prioritize issues in your life. You can reduce anxiety by carefully managing your time and energy.   Avoid unhealthy substance use. Alcohol and drug use and even caffeine or nicotine use can cause or worsen anxiety. If you're addicted to any of these substances, quitting can make you anxious. If you can't quit on your own, see your doctor or find a treatment program or support group to help you.

## 2018-08-25 ASSESSMENT — ANXIETY QUESTIONNAIRES: GAD7 TOTAL SCORE: 16

## 2018-08-25 ASSESSMENT — PATIENT HEALTH QUESTIONNAIRE - PHQ9: SUM OF ALL RESPONSES TO PHQ QUESTIONS 1-9: 5

## 2018-08-28 ENCOUNTER — TELEPHONE (OUTPATIENT)
Dept: FAMILY MEDICINE | Facility: CLINIC | Age: 58
End: 2018-08-28

## 2018-08-28 NOTE — TELEPHONE ENCOUNTER
Prior Authorization Approval    Authorization Effective Date: 7/29/2018  Authorization Expiration Date: 8/28/2019  Medication: Zovirax 5% cream  Approved Dose/Quantity:   Reference #: [id number]   Insurance Company: [employer] - Phone [phone] Fax [phone]  Expected CoPay:       CoPay Card Available:      Foundation Assistance Needed:    Which Pharmacy is filling the prescription (Not needed for infusion/clinic administered): [facility] [address] [place] [address] [address] AT [facility] OF [address] [facility]  Pharmacy Notified: Yes  Patient Notified: Yes

## 2018-08-28 NOTE — TELEPHONE ENCOUNTER
Prior Authorization Retail Medication Request    Medication/Dose: Zovirax 5% cream  ICD code (if different than what is on RX):   Previously Tried and Failed:  Oral acyclovir and oral valacyclovir  Rationale:  To treat herpes labialis (cold sores)    Insurance Name:  [facility]  Insurance ID:  [id number]      Pharmacy Information (if different than what is on RX)  Name:    Phone:      Buster Moran CMA

## 2018-08-28 NOTE — TELEPHONE ENCOUNTER
CENTRAL PRIOR AUTHORIZATION  [phone]    PA Initiation    Medication: Zovirax 5% cream  Insurance Company: [employer] - Phone [phone] Fax [phone]  Pharmacy Filling the Rx: [facility] [address] [place] [address] [address] AT [facility] OF [address]  Filling Pharmacy Phone: [phone]  Filling Pharmacy Fax:    Start Date: 8/28/2018

## 2018-09-05 ENCOUNTER — TRANSFERRED RECORDS (OUTPATIENT)
Dept: HEALTH INFORMATION MANAGEMENT | Facility: CLINIC | Age: 58
End: 2018-09-05

## 2018-10-10 ENCOUNTER — TRANSFERRED RECORDS (OUTPATIENT)
Dept: HEALTH INFORMATION MANAGEMENT | Facility: CLINIC | Age: 58
End: 2018-10-10

## 2018-10-24 ENCOUNTER — TRANSFERRED RECORDS (OUTPATIENT)
Dept: HEALTH INFORMATION MANAGEMENT | Facility: CLINIC | Age: 58
End: 2018-10-24

## 2018-10-31 ENCOUNTER — MEDICAL CORRESPONDENCE (OUTPATIENT)
Dept: HEALTH INFORMATION MANAGEMENT | Facility: CLINIC | Age: 58
End: 2018-10-31

## 2018-12-07 ENCOUNTER — TRANSFERRED RECORDS (OUTPATIENT)
Dept: HEALTH INFORMATION MANAGEMENT | Facility: CLINIC | Age: 58
End: 2018-12-07

## 2018-12-19 ENCOUNTER — TRANSFERRED RECORDS (OUTPATIENT)
Dept: HEALTH INFORMATION MANAGEMENT | Facility: CLINIC | Age: 58
End: 2018-12-19

## 2018-12-19 ENCOUNTER — MEDICAL CORRESPONDENCE (OUTPATIENT)
Dept: HEALTH INFORMATION MANAGEMENT | Facility: CLINIC | Age: 58
End: 2018-12-19

## 2018-12-26 DIAGNOSIS — F41.8 SITUATIONAL ANXIETY: ICD-10-CM

## 2018-12-27 NOTE — TELEPHONE ENCOUNTER
"Last Written Prescription Date:  8/24/18  Last Fill Quantity: 31,  # refills: 3   Last office visit: 8/24/2018 with prescribing provider:     Future Office Visit:    Requested Prescriptions   Pending Prescriptions Disp Refills     desvenlafaxine succinate (PRISTIQ) 25 MG 24 hr tablet [Pharmacy Med Name: DESVENLAFAXINE ER SUCCINATE 25MG T] 31 tablet 0     Sig: TAKE 1 TABLET(25 MG) BY MOUTH DAILY    Serotonin-Norepinephrine Reuptake Inhibitors  Failed - 12/26/2018 11:08 PM       Failed - Normal serum creatinine on file in past 12 months    Recent Labs   Lab Test 06/16/17  1635   CR 0.81            Passed - Blood pressure under 140/90 in past 12 months    BP Readings from Last 3 Encounters:   08/24/18 100/63   08/10/18 99/61   06/23/17 100/60                Passed - Recent (12 mo) or future (30 days) visit within the authorizing provider's specialty    Patient had office visit in the last 12 months or has a visit in the next 30 days with authorizing provider or within the authorizing provider's specialty.  See \"Patient Info\" tab in inbasket, or \"Choose Columns\" in Meds & Orders section of the refill encounter.             Passed - Patient is age 18 or older       Passed - No active pregnancy on record       Passed - No positive pregnancy test in past 12 months          "

## 2018-12-28 RX ORDER — DESVENLAFAXINE 25 MG/1
TABLET, EXTENDED RELEASE ORAL
Qty: 90 TABLET | Refills: 0 | Status: SHIPPED | OUTPATIENT
Start: 2018-12-28 | End: 2019-03-17

## 2018-12-28 NOTE — TELEPHONE ENCOUNTER
The patient called to check on this and she said she took her last one last night and she said if we are going to do it please send it today

## 2019-02-13 ENCOUNTER — MEDICAL CORRESPONDENCE (OUTPATIENT)
Dept: HEALTH INFORMATION MANAGEMENT | Facility: CLINIC | Age: 59
End: 2019-02-13

## 2019-02-13 ENCOUNTER — TRANSFERRED RECORDS (OUTPATIENT)
Dept: HEALTH INFORMATION MANAGEMENT | Facility: CLINIC | Age: 59
End: 2019-02-13

## 2019-03-13 DIAGNOSIS — R42 DIZZINESS AND GIDDINESS: ICD-10-CM

## 2019-03-13 DIAGNOSIS — F43.9 STRESS: ICD-10-CM

## 2019-03-13 NOTE — TELEPHONE ENCOUNTER
Pending Prescriptions:                       Disp   Refills    ALPRAZolam (XANAX) 0.25 MG tablet [Pharma*45 tab*0            Sig: TAKE TWO TABLETS BY MOUTH THREE TIMES DAILY AS           NEEDED FOR ANXIETY.          Last Written Prescription Date:  08/10/2018  Last Fill Quantity: 45,   # refills: 1  Last Office Visit: 08/24/2018  Future Office visit:       Routing refill request to provider for review/approval because:  Drug not on the [facility] refill protocol or controlled substance

## 2019-03-14 RX ORDER — ALPRAZOLAM 0.25 MG
TABLET ORAL
Qty: 45 TABLET | Refills: 0 | Status: SHIPPED | OUTPATIENT
Start: 2019-03-14 | End: 2020-02-25

## 2019-03-14 NOTE — TELEPHONE ENCOUNTER
Routing refill request to provider for review/approval because:  Drug not on the [facility] refill protocol   RN unable to access  website.    NAM GasparN, RN  [employer] Triage

## 2019-03-16 DIAGNOSIS — T78.40XS ALLERGIC STATE, SEQUELA: ICD-10-CM

## 2019-03-16 NOTE — TELEPHONE ENCOUNTER
"Last Written Prescription Date:  8/10/18  Last Fill Quantity: 1 each,  # refills: 3   Last office visit: 8/24/2018 with prescribing provider:  Chiquis   Future Office Visit:      Requested Prescriptions   Pending Prescriptions Disp Refills     PROAIR RESPICLICK 108 (90 Base) MCG/ACT inhaler [Pharmacy Med Name: PROAIR RESPICLICK ORAL PWD INH(200)]  0     Sig: INHALE 2 PUFFS INTO THE LUNGS EVERY 6 HOURS AS NEEDED FOR SHORTNESS OF BREATH OR DIFFICULT BREATHING OR WHEEZING    Asthma Maintenance Inhalers - Anticholinergics Failed - 3/16/2019  1:35 PM       Failed - Asthma control assessment score within normal limits in last 6 months    Please review ACT score.          Failed - Recent (6 mo) or future (30 days) visit within the authorizing provider's specialty    Patient had office visit in the last 6 months or has a visit in the next 30 days with authorizing provider or within the authorizing provider's specialty.  See \"Patient Info\" tab in inbasket, or \"Choose Columns\" in Meds & Orders section of the refill encounter.           Passed - Patient is age 12 years or older       Passed - Medication is active on med list        No flowsheet data found.    "

## 2019-03-18 NOTE — TELEPHONE ENCOUNTER
Routing refill request to provider for review/approval because:  Drug not on the [facility] refill protocol for dx of allergic state  Valorie MOSES RN

## 2019-03-19 RX ORDER — ALBUTEROL SULFATE 90 UG/1
POWDER, METERED RESPIRATORY (INHALATION)
Qty: 1 EACH | Refills: 11 | Status: SHIPPED | OUTPATIENT
Start: 2019-03-19 | End: 2020-04-10

## 2019-04-12 ENCOUNTER — TRANSFERRED RECORDS (OUTPATIENT)
Dept: HEALTH INFORMATION MANAGEMENT | Facility: CLINIC | Age: 59
End: 2019-04-12

## 2019-04-12 ENCOUNTER — MEDICAL CORRESPONDENCE (OUTPATIENT)
Dept: HEALTH INFORMATION MANAGEMENT | Facility: CLINIC | Age: 59
End: 2019-04-12

## 2019-05-02 ENCOUNTER — TELEPHONE (OUTPATIENT)
Dept: FAMILY MEDICINE | Facility: CLINIC | Age: 59
End: 2019-05-02

## 2019-05-08 ENCOUNTER — OFFICE VISIT (OUTPATIENT)
Dept: FAMILY MEDICINE | Facility: CLINIC | Age: 59
End: 2019-05-08
Payer: COMMERCIAL

## 2019-05-08 VITALS
DIASTOLIC BLOOD PRESSURE: 68 MMHG | HEIGHT: 61 IN | WEIGHT: 195 LBS | OXYGEN SATURATION: 96 % | SYSTOLIC BLOOD PRESSURE: 107 MMHG | HEART RATE: 94 BPM | TEMPERATURE: 97.8 F | BODY MASS INDEX: 36.82 KG/M2

## 2019-05-08 DIAGNOSIS — K51.90 ULCERATIVE COLITIS WITHOUT COMPLICATIONS, UNSPECIFIED LOCATION (H): ICD-10-CM

## 2019-05-08 DIAGNOSIS — R93.89 THICKENED ENDOMETRIUM: ICD-10-CM

## 2019-05-08 DIAGNOSIS — Z01.818 PREOP GENERAL PHYSICAL EXAM: Primary | ICD-10-CM

## 2019-05-08 LAB
CREAT SERPL-MCNC: 0.83 MG/DL (ref 0.52–1.04)
GFR SERPL CREATININE-BSD FRML MDRD: 77 ML/MIN/{1.73_M2}
HGB BLD-MCNC: 15.6 G/DL (ref 11.7–15.7)
POTASSIUM SERPL-SCNC: 3.9 MMOL/L (ref 3.4–5.3)

## 2019-05-08 PROCEDURE — 85018 HEMOGLOBIN: CPT | Performed by: NURSE PRACTITIONER

## 2019-05-08 PROCEDURE — 84132 ASSAY OF SERUM POTASSIUM: CPT | Performed by: NURSE PRACTITIONER

## 2019-05-08 PROCEDURE — 82565 ASSAY OF CREATININE: CPT | Performed by: NURSE PRACTITIONER

## 2019-05-08 PROCEDURE — 93000 ELECTROCARDIOGRAM COMPLETE: CPT | Performed by: NURSE PRACTITIONER

## 2019-05-08 PROCEDURE — 36415 COLL VENOUS BLD VENIPUNCTURE: CPT | Performed by: NURSE PRACTITIONER

## 2019-05-08 PROCEDURE — 99215 OFFICE O/P EST HI 40 MIN: CPT | Performed by: NURSE PRACTITIONER

## 2019-05-08 ASSESSMENT — MIFFLIN-ST. JEOR: SCORE: 1401.89

## 2019-05-08 NOTE — PATIENT INSTRUCTIONS
Before Your Surgery      Call your surgeon if there is any change in your health. This includes signs of a cold or flu (such as a sore throat, runny nose, cough, rash or fever).    Do not smoke, drink alcohol or take over the counter medicine (unless your surgeon or primary care doctor tells you to) for the 24 hours before and after surgery.    If you take prescribed drugs: Follow your doctor s orders about which medicines to take and which to stop until after surgery.    Eating and drinking prior to surgery: follow the instructions from your surgeon    Take a shower or bath the night before surgery. Use the soap your surgeon gave you to gently clean your skin. If you do not have soap from your surgeon, use your regular soap. Do not shave or scrub the surgery site.  Wear clean pajamas and have clean sheets on your bed.     Hold supplements morning of surgery

## 2019-05-08 NOTE — PROGRESS NOTES
[place] [address] [facility] [address]  [phone]  Dept: [phone]    PRE-OP EVALUATION:  Today's date: 2019    John Steven (: 1960) presents for pre-operative evaluation assessment as requested by Chelle Oneal ([facility]).  She requires evaluation and anesthesia risk assessment prior to undergoing surgery/procedure for treatment of bleeding, fibroids, benign polyp, thickened endometrial lining .    Proposed Surgery/ Procedure: HYSTEROSCOPY, WITH DILATION AND CURETTAGE OF UTERUS USING MORCELLATOR (MYOSURE)  Date of Surgery/ Procedure: 2019  Time of Surgery/ Procedure: 9:40 AM  Hospital/Surgical Facility:  OR  Fax number for surgical facility: [facility]  Primary Physician: Rianna Sim  Type of Anesthesia Anticipated: General    Patient has a Health Care Directive or Living Will:  NO    1. NO - Do you have a history of heart attack, stroke, stent, bypass or surgery on an artery in the head, neck, heart or legs?  2. NO - Do you ever have any pain or discomfort in your chest?  3. NO - Do you have a history of  Heart Failure?  4. NO - Are you troubled by shortness of breath when: walking on the level, up a slight hill or at night?  5. NO - Do you currently have a cold, bronchitis or other respiratory infection?  6. YES - DO YOU HAVE A COUGH, SHORTNESS OF BREATH OR WHEEZING? Wheezing--esophageal procedure in the past which leads to wheezing at times.  7. YES - DO YOU SOMETIMES GET PAINS IN THE CALVES OF YOUR LEGS WHEN YOU WALK? For years on occasion of left lateral lower leg (not calf)  8. YES - DO YOU OR ANYONE IN YOUR FAMILY HAVE PREVIOUS HISTORY OF BLOOD CLOTS? Mother stroke, father PE, grandmother stroke.   9. NO - Do you or does anyone in your family have a serious bleeding problem such as prolonged bleeding following surgeries or cuts?  10. YES - HAVE YOU EVER HAD PROBLEMS WITH ANEMIA OR BEEN TOLD TO TAKE IRON PILLS? Many years ago when she  was younger  11. YES - HAVE YOU HAD ANY ABNORMAL BLOOD LOSS SUCH AS BLACK, TARRY OR BLOODY STOOLS, OR ABNORMAL VAGINAL BLEEDING? Excess vaginal bleeding   12. NO - Have you ever had a blood transfusion?  13. YES - HAVE YOU OR ANY OF YOUR RELATIVES EVER HAD PROBLEMS WITH ANESTHESIA? Self--vomiting after waking up (fentanyl and morphine cause N/V). Difficulty coming out of anesthesia d/t breathing issues.   D&C 5 years ago with no issues   Esophageal surgery around 2003. This surgery had breathing issues.   14. NO - Do you have sleep apnea, excessive snoring or daytime drowsiness?  15. NO - Do you have any prosthetic heart valves?  16. NO - Do you have prosthetic joints?  17. NO - Is there any chance that you may be pregnant?      HPI:     HPI related to upcoming procedure: Has had fibroids for a long time. Has had 3 endometrial biopsies in the last 3 years. Also previously had a D&C 4-5 years ago. Plan to do this d&C and treat with progesterone. If continued bleeding then may need ARVIN     Has been feeling well lately.  currently has a cold.   Has chronic wheezing. Uses inhaler about 3 times a month but feels she should use it more.     Has UC and is treated by [facility], Dr Dailey. Hasn't used any steroids (cream for a long time. Last used cream 10 months ago. Never has had prednisone. Gets Remicaide every 2 months. Next is June 7.    See problem list for active medical problems.  Problems all longstanding and stable, except as noted/documented.  See ROS for pertinent symptoms related to these conditions.                                                                                                                                                          .    MEDICAL HISTORY:     Patient Active Problem List    Diagnosis Date Noted     Immunocompromised (H) 08/10/2018     Priority: Medium     Obesity      Priority: Medium     Ulcerative colitis (H)      Priority: Medium     Problem list name updated by automated  process. Provider to review       Other urethritis(597.89)      Priority: Medium     Other and unspecified hormones and synthetic substitutes causing adverse effect in therapeutic use      Priority: Medium     Allergic rhinitis      Priority: Medium     Problem list name updated by automated process. Provider to review       Disorder of esophagus      Priority: Medium     Achalasia, spasm  Problem list name updated by automated process. Provider to review       Rosacea      Priority: Medium     Dizziness and giddiness      Priority: Medium     Other specified disorder of rectum and anus 07/25/2006     Priority: Medium      Past Medical History:   Diagnosis Date     Allergic rhinitis, cause unspecified      Dizziness and giddiness      Esophageal spasm 2003    nifedipine, s/p heller myotomy     Immunocompromised (H) 8/10/2018     Macrocytosis      Obesity      Other and unspecified hormones and synthetic substitutes causing adverse effect in therapeutic use     On BCP, Dr Mijares     Other urethritis(597.89)      Rosacea      Travel     valium,xanax narcotic agreement     Ulcerative colitis, unspecified     F/B [facility]     Uncomplicated asthma      Unspecified disorder of esophagus     Achalasia, spasm     Vertigo      Past Surgical History:   Procedure Laterality Date     BIOPSY      BREAST     C NONSPECIFIC PROCEDURE  2003    Heller myotomy     C NONSPECIFIC PROCEDURE      EB for spotting,fibroid     C NONSPECIFIC PROCEDURE  2007    rt breast biopsy     DILATION AND CURETTAGE, HYSTEROSCOPY DIAGNOSTIC, COMBINED N/A 8/24/2015    Procedure: COMBINED DILATION AND CURETTAGE, HYSTEROSCOPY DIAGNOSTIC;  Surgeon: Chelle Rebollar MD;  Location: [facility]     GI SURGERY       GYN SURGERY       HC COLONOSCOPY THRU STOMA, DIAGNOSTIC  4/06     Current Outpatient Medications   Medication Sig Dispense Refill     acyclovir (ZOVIRAX) 5 % cream Apply topically 5 times daily Apply to cold sores/fever blisters 5 g 3     albuterol  (PROAIR HFA, PROVENTIL HFA, VENTOLIN HFA) 108 (90 BASE) MCG/ACT inhaler Inhale 2 puffs into the lungs every 6 hours as needed for shortness of breath / dyspnea or wheezing       ALPRAZolam (XANAX) 0.25 MG tablet TAKE TWO TABLETS BY MOUTH THREE TIMES DAILY AS NEEDED FOR ANXIETY. 45 tablet 0     ANUCORT-HC 25 MG Suppository UNW AND I 1 SUP REC  Q EVENING FOR 2 WEEKS  1     CANASA 1000 MG Suppository UNW AND I 1 SUP REC QD PRN  10     cetirizine (ZYRTEC) 10 MG tablet Take 10 mg by mouth daily       clidinium-chlordiazePOXIDE (LIBRAX) 5-2.5 MG CAPS per capsule TAKE ONE CAPSULE BY MOUTH DAILY AS NEEDED 30 capsule 12     CORTIFOAM 10 % rectal foam JESSICA REC BID FOR 14 DAYS  0     cyclobenzaprine (FLEXERIL) 10 MG tablet TAKE 1/2 TO 1 TABLET(5 TO 10 MG) BY MOUTH EVERY 8 HOURS AS NEEDED FOR MUSCLE SPASMS 30 tablet 3     desvenlafaxine succinate (PRISTIQ) 25 MG 24 hr tablet TAKE 1 TABLET BY MOUTH DAILY 60 tablet 3     diphenhydrAMINE (BENADRYL) 25 MG capsule Take 1 capsule by mouth nightly as needed for itching. Post nasal drip causing esophageal spasms       folic acid (FOLVITE) 1 MG tablet TAKE 1 TABLET BY MOUTH EVERY DAY. 90 tablet 3     hydrocortisone (CORTENEMA) 100 MG/60ML enema PLACE 1 ENEMA  BY RECTAL ROUTE QD FOR 2 WEEKS HS.  1     LEVSIN/SL 0.125 MG SL SUBL 1 TAB 4 TIMES DAILY, BEFORE MEALS AND AT BEDTIME 30 12     methotrexate 2.5 MG tablet CHEMO        metroNIDAZOLE (METROGEL) 1 % gel Apply  topically 2 times daily. 30 g 12     NIFEdipine ER osmotic (NIFEDICAL XL) 30 MG 24 hr tablet Take 1 tablet (30 mg) by mouth daily 90 tablet 0     order for DME Equipment being ordered: Medium compression stockings   20-30 mm 2 each 1     order for DME Equipment being ordered: Compression stockings  20-30 mm pressure 2 each 3     order for DME Equipment being ordered:  Compression pump  Flexitouch 1 each 0     PROAIR RESPICLICK 108 (90 Base) MCG/ACT inhaler INHALE 2 PUFFS INTO THE LUNGS EVERY 6 HOURS AS NEEDED FOR SHORTNESS OF  "BREATH OR DIFFICULT BREATHING OR WHEEZING 1 each 11     progesterone (PROMETRIUM) 200 MG capsule        psyllium 0.52 G capsule Take 1 capsule by mouth daily       REMICADE 100 MG injection        sulfaSALAzine ER (AZULFIDINE EN) 500 MG EC tablet TAKE 4 TABLETS BY MOUTH TWICE DAILY 240 tablet 2     vitamin D (ERGOCALCIFEROL) 87708 UNIT capsule        OTC products: magnesium daily     Allergies   Allergen Reactions     Alcohol Other (See Comments)     flushing     Fentanyl Nausea and Vomiting     Food      Corn, mushrooms, all peppers     Morphine Nausea and Vomiting     vomiting     Penicillins Hives     Shellfish Allergy Hives     Hives over entire body      Latex Allergy: No but has had itching from adhesives     Social History     Tobacco Use     Smoking status: Former Smoker     Packs/day: 1.00     Years: 10.00     Pack years: 10.00     Types: Cigarettes     Last attempt to quit: 1989     Years since quittin.3     Smokeless tobacco: Never Used   Substance Use Topics     Alcohol use: Yes     Alcohol/week: 0.0 oz     Comment: 2-3 drinks  times a week     History   Drug Use No       REVIEW OF SYSTEMS:   Constitutional, neuro, ENT, endocrine, pulmonary, cardiac, gastrointestinal, genitourinary, musculoskeletal, integument and psychiatric systems are negative, except as otherwise noted.    EXAM:   /68 (BP Location: Right arm, Cuff Size: Adult Large)   Pulse 94   Temp 97.8  F (36.6  C) (Tympanic)   Ht 1.549 m (5' 1\")   Wt 88.5 kg (195 lb)   SpO2 96%   Breastfeeding? No   BMI 36.84 kg/m      GENERAL APPEARANCE: healthy, alert and no distress     EYES: EOMI, PERRL     HENT:  mouth without ulcers or lesions     NECK:  no asymmetry, masses, or scars     RESP: lungs clear to auscultation - no rales, rhonchi or wheezes     CV: regular rates and rhythm, normal S1 S2, no S3 or S4 and no murmur, click or rub     MS: extremities normal- no gross deformities noted, no evidence of inflammation in joints, " FROM in all extremities.     SKIN: no suspicious lesions or rashes     NEURO: Normal strength and tone, sensory exam grossly normal, mentation intact and speech normal     PSYCH: mentation appears normal. and affect normal/bright     LYMPHATICS: No cervical adenopathy    DIAGNOSTICS:     EKG: Normal Sinus Rhythm, normal axis, normal intervals, no acute ST/T changes c/w ischemia, no LVH by voltage criteria, unchanged from previous tracings  Labs Resulted Today:   Results for orders placed or performed in visit on 05/08/19   Potassium   Result Value Ref Range    Potassium 3.9 3.4 - 5.3 mmol/L   Creatinine   Result Value Ref Range    Creatinine 0.83 0.52 - 1.04 mg/dL    GFR Estimate 77 >60 mL/min/[1.73_m2]    GFR Estimate If Black 89 >60 mL/min/[1.73_m2]   Hemoglobin   Result Value Ref Range    Hemoglobin 15.6 11.7 - 15.7 g/dL       Recent Labs   Lab Test 06/16/17  1635 04/15/16  1034  09/20/13  0817   HGB 12.1 13.1   < >  --     257   < >  --     139   < >  --    POTASSIUM 3.4 4.4   < >  --    CR 0.81 0.88   < >  --    A1C  --   --   --  4.4    < > = values in this interval not displayed.        IMPRESSION:   Reason for surgery/procedure: D&C   Diagnosis/reason for consult: medical preop    The proposed surgical procedure is considered INTERMEDIATE risk.    REVISED CARDIAC RISK INDEX  The patient has the following serious cardiovascular risks for perioperative complications such as (MI, PE, VFib and 3  AV Block):  No serious cardiac risks  INTERPRETATION: 0 risks: Class I (very low risk - 0.4% complication rate)    The patient has the following additional risks for perioperative complications:  No identified additional risks  Immunocompromised       ICD-10-CM    1. Preop general physical exam Z01.818 EKG 12-lead complete w/read - Clinics     Potassium     Creatinine     Hemoglobin   2. Thickened endometrium R93.89    3. Ulcerative colitis without complications, unspecified location (H) K51.90         RECOMMENDATIONS:     History of UC and has been in remission for some time    --Patient is to take all scheduled medications on the day of surgery EXCEPT for modifications listed below.      DMARD Use    --Infliximab: surgery is in the middle of her treatment and will have about 4 weeks before and after until next treatment.     --Methotrexate: Continue in the perioperative period.    Hold supplements morning of surgery         APPROVAL GIVEN to proceed with proposed procedure, without further diagnostic evaluation       Signed Electronically by: ALFREDO Alatorre CNP    Copy of this evaluation report is provided to requesting physician.    [facility] Preop Guidelines    Revised Cardiac Risk Index

## 2019-05-10 RX ORDER — LEVOCETIRIZINE DIHYDROCHLORIDE 5 MG/1
5 TABLET, FILM COATED ORAL EVERY EVENING
COMMUNITY

## 2019-05-10 RX ORDER — NIFEDIPINE 60 MG/1
60 TABLET, EXTENDED RELEASE ORAL DAILY
COMMUNITY

## 2019-05-10 RX ORDER — MULTIVIT-MIN/IRON/FOLIC ACID/K 18-600-40
1000 CAPSULE ORAL DAILY
COMMUNITY

## 2019-05-10 RX ORDER — ESTRADIOL 2 MG/1
2 TABLET ORAL DAILY
COMMUNITY

## 2019-05-10 RX ORDER — FOLIC ACID 0.8 MG
500 TABLET ORAL DAILY
COMMUNITY

## 2019-05-13 ENCOUNTER — ANESTHESIA EVENT (OUTPATIENT)
Dept: SURGERY | Facility: CLINIC | Age: 59
End: 2019-05-13
Payer: COMMERCIAL

## 2019-05-13 ENCOUNTER — HOSPITAL ENCOUNTER (OUTPATIENT)
Facility: CLINIC | Age: 59
Discharge: HOME | End: 2019-05-13
Attending: OBSTETRICS & GYNECOLOGY | Admitting: OBSTETRICS & GYNECOLOGY
Payer: COMMERCIAL

## 2019-05-13 ENCOUNTER — ANESTHESIA (OUTPATIENT)
Dept: SURGERY | Facility: CLINIC | Age: 59
End: 2019-05-13
Payer: COMMERCIAL

## 2019-05-13 VITALS
HEART RATE: 79 BPM | HEIGHT: 61 IN | SYSTOLIC BLOOD PRESSURE: 113 MMHG | TEMPERATURE: 95.9 F | WEIGHT: 194.8 LBS | BODY MASS INDEX: 36.78 KG/M2 | DIASTOLIC BLOOD PRESSURE: 68 MMHG | OXYGEN SATURATION: 98 % | RESPIRATION RATE: 12 BRPM

## 2019-05-13 PROCEDURE — 25800030 ZZH RX IP 258 OP 636: Performed by: NURSE ANESTHETIST, CERTIFIED REGISTERED

## 2019-05-13 PROCEDURE — 25000132 ZZH RX MED GY IP 250 OP 250 PS 637: Performed by: NURSE ANESTHETIST, CERTIFIED REGISTERED

## 2019-05-13 PROCEDURE — 36000056 ZZH SURGERY LEVEL 3 1ST 30 MIN: Performed by: OBSTETRICS & GYNECOLOGY

## 2019-05-13 PROCEDURE — 25000128 H RX IP 250 OP 636: Performed by: NURSE ANESTHETIST, CERTIFIED REGISTERED

## 2019-05-13 PROCEDURE — 25000125 ZZHC RX 250: Performed by: NURSE ANESTHETIST, CERTIFIED REGISTERED

## 2019-05-13 PROCEDURE — 40000170 ZZH STATISTIC PRE-PROCEDURE ASSESSMENT II: Performed by: OBSTETRICS & GYNECOLOGY

## 2019-05-13 PROCEDURE — 25000131 ZZH RX MED GY IP 250 OP 636 PS 637: Performed by: ANESTHESIOLOGY

## 2019-05-13 PROCEDURE — 27210794 ZZH OR GENERAL SUPPLY STERILE: Performed by: OBSTETRICS & GYNECOLOGY

## 2019-05-13 PROCEDURE — 25800025 ZZH RX 258: Performed by: OBSTETRICS & GYNECOLOGY

## 2019-05-13 PROCEDURE — 71000027 ZZH RECOVERY PHASE 2 EACH 15 MINS: Performed by: OBSTETRICS & GYNECOLOGY

## 2019-05-13 PROCEDURE — 36000058 ZZH SURGERY LEVEL 3 EA 15 ADDTL MIN: Performed by: OBSTETRICS & GYNECOLOGY

## 2019-05-13 PROCEDURE — 71000012 ZZH RECOVERY PHASE 1 LEVEL 1 FIRST HR: Performed by: OBSTETRICS & GYNECOLOGY

## 2019-05-13 PROCEDURE — 88305 TISSUE EXAM BY PATHOLOGIST: CPT | Mod: 26 | Performed by: OBSTETRICS & GYNECOLOGY

## 2019-05-13 PROCEDURE — 25000132 ZZH RX MED GY IP 250 OP 250 PS 637: Performed by: ANESTHESIOLOGY

## 2019-05-13 PROCEDURE — 88305 TISSUE EXAM BY PATHOLOGIST: CPT | Performed by: OBSTETRICS & GYNECOLOGY

## 2019-05-13 PROCEDURE — 37000009 ZZH ANESTHESIA TECHNICAL FEE, EACH ADDTL 15 MIN: Performed by: OBSTETRICS & GYNECOLOGY

## 2019-05-13 PROCEDURE — 37000008 ZZH ANESTHESIA TECHNICAL FEE, 1ST 30 MIN: Performed by: OBSTETRICS & GYNECOLOGY

## 2019-05-13 RX ORDER — ONDANSETRON 4 MG/1
4 TABLET, ORALLY DISINTEGRATING ORAL EVERY 30 MIN PRN
Status: DISCONTINUED | OUTPATIENT
Start: 2019-05-13 | End: 2019-05-13 | Stop reason: HOSPADM

## 2019-05-13 RX ORDER — APREPITANT 40 MG/1
40 CAPSULE ORAL DAILY
Status: DISCONTINUED | OUTPATIENT
Start: 2019-05-13 | End: 2019-05-13 | Stop reason: HOSPADM

## 2019-05-13 RX ORDER — MEPERIDINE HYDROCHLORIDE 25 MG/ML
12.5 INJECTION INTRAMUSCULAR; INTRAVENOUS; SUBCUTANEOUS
Status: DISCONTINUED | OUTPATIENT
Start: 2019-05-13 | End: 2019-05-13 | Stop reason: HOSPADM

## 2019-05-13 RX ORDER — HYDROXYZINE HYDROCHLORIDE 25 MG/1
25 TABLET, FILM COATED ORAL ONCE
Status: COMPLETED | OUTPATIENT
Start: 2019-05-13 | End: 2019-05-13

## 2019-05-13 RX ORDER — FENTANYL CITRATE 50 UG/ML
25-50 INJECTION, SOLUTION INTRAMUSCULAR; INTRAVENOUS
Status: DISCONTINUED | OUTPATIENT
Start: 2019-05-13 | End: 2019-05-13 | Stop reason: HOSPADM

## 2019-05-13 RX ORDER — ACETAMINOPHEN 500 MG
1000 TABLET ORAL ONCE
Status: COMPLETED | OUTPATIENT
Start: 2019-05-13 | End: 2019-05-13

## 2019-05-13 RX ORDER — ONDANSETRON 2 MG/ML
4 INJECTION INTRAMUSCULAR; INTRAVENOUS EVERY 30 MIN PRN
Status: DISCONTINUED | OUTPATIENT
Start: 2019-05-13 | End: 2019-05-13 | Stop reason: HOSPADM

## 2019-05-13 RX ORDER — SODIUM CHLORIDE, SODIUM LACTATE, POTASSIUM CHLORIDE, CALCIUM CHLORIDE 600; 310; 30; 20 MG/100ML; MG/100ML; MG/100ML; MG/100ML
INJECTION, SOLUTION INTRAVENOUS CONTINUOUS
Status: DISCONTINUED | OUTPATIENT
Start: 2019-05-13 | End: 2019-05-13 | Stop reason: HOSPADM

## 2019-05-13 RX ORDER — ALBUTEROL SULFATE 90 UG/1
AEROSOL, METERED RESPIRATORY (INHALATION) PRN
Status: DISCONTINUED | OUTPATIENT
Start: 2019-05-13 | End: 2019-05-13

## 2019-05-13 RX ORDER — PROPOFOL 10 MG/ML
INJECTION, EMULSION INTRAVENOUS CONTINUOUS PRN
Status: DISCONTINUED | OUTPATIENT
Start: 2019-05-13 | End: 2019-05-13

## 2019-05-13 RX ORDER — ALBUTEROL SULFATE 0.83 MG/ML
2.5 SOLUTION RESPIRATORY (INHALATION) EVERY 4 HOURS PRN
Status: DISCONTINUED | OUTPATIENT
Start: 2019-05-13 | End: 2019-05-13 | Stop reason: HOSPADM

## 2019-05-13 RX ORDER — NALOXONE HYDROCHLORIDE 0.4 MG/ML
.1-.4 INJECTION, SOLUTION INTRAMUSCULAR; INTRAVENOUS; SUBCUTANEOUS
Status: DISCONTINUED | OUTPATIENT
Start: 2019-05-13 | End: 2019-05-13 | Stop reason: HOSPADM

## 2019-05-13 RX ORDER — HYDRALAZINE HYDROCHLORIDE 20 MG/ML
2.5-5 INJECTION INTRAMUSCULAR; INTRAVENOUS EVERY 10 MIN PRN
Status: DISCONTINUED | OUTPATIENT
Start: 2019-05-13 | End: 2019-05-13 | Stop reason: HOSPADM

## 2019-05-13 RX ORDER — ONDANSETRON 2 MG/ML
INJECTION INTRAMUSCULAR; INTRAVENOUS PRN
Status: DISCONTINUED | OUTPATIENT
Start: 2019-05-13 | End: 2019-05-13

## 2019-05-13 RX ORDER — SODIUM CHLORIDE, SODIUM LACTATE, POTASSIUM CHLORIDE, CALCIUM CHLORIDE 600; 310; 30; 20 MG/100ML; MG/100ML; MG/100ML; MG/100ML
INJECTION, SOLUTION INTRAVENOUS CONTINUOUS PRN
Status: DISCONTINUED | OUTPATIENT
Start: 2019-05-13 | End: 2019-05-13

## 2019-05-13 RX ORDER — DEXAMETHASONE SODIUM PHOSPHATE 4 MG/ML
INJECTION, SOLUTION INTRA-ARTICULAR; INTRALESIONAL; INTRAMUSCULAR; INTRAVENOUS; SOFT TISSUE PRN
Status: DISCONTINUED | OUTPATIENT
Start: 2019-05-13 | End: 2019-05-13

## 2019-05-13 RX ORDER — HYDROMORPHONE HYDROCHLORIDE 1 MG/ML
.3-.5 INJECTION, SOLUTION INTRAMUSCULAR; INTRAVENOUS; SUBCUTANEOUS EVERY 10 MIN PRN
Status: DISCONTINUED | OUTPATIENT
Start: 2019-05-13 | End: 2019-05-13 | Stop reason: HOSPADM

## 2019-05-13 RX ORDER — LIDOCAINE HYDROCHLORIDE 20 MG/ML
INJECTION, SOLUTION INFILTRATION; PERINEURAL PRN
Status: DISCONTINUED | OUTPATIENT
Start: 2019-05-13 | End: 2019-05-13

## 2019-05-13 RX ORDER — PROPOFOL 10 MG/ML
INJECTION, EMULSION INTRAVENOUS PRN
Status: DISCONTINUED | OUTPATIENT
Start: 2019-05-13 | End: 2019-05-13

## 2019-05-13 RX ADMIN — PROPOFOL 200 MCG/KG/MIN: 10 INJECTION, EMULSION INTRAVENOUS at 10:03

## 2019-05-13 RX ADMIN — ACETAMINOPHEN 1000 MG: 500 TABLET, FILM COATED ORAL at 09:19

## 2019-05-13 RX ADMIN — PROPOFOL 50 MG: 10 INJECTION, EMULSION INTRAVENOUS at 10:20

## 2019-05-13 RX ADMIN — PHENYLEPHRINE HYDROCHLORIDE 100 MCG: 10 INJECTION, SOLUTION INTRAMUSCULAR; INTRAVENOUS; SUBCUTANEOUS at 10:17

## 2019-05-13 RX ADMIN — PROPOFOL 200 MG: 10 INJECTION, EMULSION INTRAVENOUS at 10:03

## 2019-05-13 RX ADMIN — LIDOCAINE HYDROCHLORIDE 100 MG: 20 INJECTION, SOLUTION INFILTRATION; PERINEURAL at 10:03

## 2019-05-13 RX ADMIN — PHENYLEPHRINE HYDROCHLORIDE 100 MCG: 10 INJECTION, SOLUTION INTRAMUSCULAR; INTRAVENOUS; SUBCUTANEOUS at 10:20

## 2019-05-13 RX ADMIN — APREPITANT 40 MG: 40 CAPSULE ORAL at 09:19

## 2019-05-13 RX ADMIN — PHENYLEPHRINE HYDROCHLORIDE 100 MCG: 10 INJECTION, SOLUTION INTRAMUSCULAR; INTRAVENOUS; SUBCUTANEOUS at 10:27

## 2019-05-13 RX ADMIN — ALBUTEROL SULFATE 2 PUFF: 90 AEROSOL, METERED RESPIRATORY (INHALATION) at 10:40

## 2019-05-13 RX ADMIN — HYDROXYZINE HYDROCHLORIDE 25 MG: 25 TABLET ORAL at 09:19

## 2019-05-13 RX ADMIN — ONDANSETRON 4 MG: 2 INJECTION INTRAMUSCULAR; INTRAVENOUS at 10:10

## 2019-05-13 RX ADMIN — PHENYLEPHRINE HYDROCHLORIDE 200 MCG: 10 INJECTION, SOLUTION INTRAMUSCULAR; INTRAVENOUS; SUBCUTANEOUS at 10:32

## 2019-05-13 RX ADMIN — PHENYLEPHRINE HYDROCHLORIDE 100 MCG: 10 INJECTION, SOLUTION INTRAMUSCULAR; INTRAVENOUS; SUBCUTANEOUS at 10:23

## 2019-05-13 RX ADMIN — SODIUM CHLORIDE, POTASSIUM CHLORIDE, SODIUM LACTATE AND CALCIUM CHLORIDE: 600; 310; 30; 20 INJECTION, SOLUTION INTRAVENOUS at 09:59

## 2019-05-13 RX ADMIN — MIDAZOLAM 2 MG: 1 INJECTION INTRAMUSCULAR; INTRAVENOUS at 09:59

## 2019-05-13 RX ADMIN — PROPOFOL 50 MG: 10 INJECTION, EMULSION INTRAVENOUS at 10:15

## 2019-05-13 RX ADMIN — PROPOFOL 50 MG: 10 INJECTION, EMULSION INTRAVENOUS at 10:11

## 2019-05-13 RX ADMIN — PROPOFOL 50 MG: 10 INJECTION, EMULSION INTRAVENOUS at 10:07

## 2019-05-13 RX ADMIN — DEXAMETHASONE SODIUM PHOSPHATE 4 MG: 4 INJECTION, SOLUTION INTRA-ARTICULAR; INTRALESIONAL; INTRAMUSCULAR; INTRAVENOUS; SOFT TISSUE at 10:10

## 2019-05-13 ASSESSMENT — ENCOUNTER SYMPTOMS
SEIZURES: 0
DYSRHYTHMIAS: 0

## 2019-05-13 ASSESSMENT — MIFFLIN-ST. JEOR: SCORE: 1400.99

## 2019-05-13 ASSESSMENT — COPD QUESTIONNAIRES: COPD: 0

## 2019-05-13 NOTE — ANESTHESIA PREPROCEDURE EVALUATION
Anesthesia Pre-Procedure Evaluation    Patient: John Steven   MRN: [id number] : 1960          Preoperative Diagnosis: POST MENOPAUSAL BLEEDING    Procedure(s):  HYSTEROSCOPY, WITH DILATION AND CURETTAGE OF UTERUS USING MORCELLATOR (MYOSURE)    Past Medical History:   Diagnosis Date     Allergic rhinitis, cause unspecified      Dizziness and giddiness      Esophageal spasm     nifedipine, s/p heller myotomy     Immunocompromised (H) 8/10/2018     Macrocytosis      Obesity      Other and unspecified hormones and synthetic substitutes causing adverse effect in therapeutic use     On BCP, Dr Mijares     Other urethritis(677.89)      Rosacea      Travel     valium,xanax narcotic agreement     Ulcerative colitis, unspecified     F/B [facility]     Uncomplicated asthma      Unspecified disorder of esophagus     Achalasia, spasm     Vertigo      Past Surgical History:   Procedure Laterality Date     BIOPSY      BREAST     C NONSPECIFIC PROCEDURE      Heller myotomy     C NONSPECIFIC PROCEDURE      EB for spotting,fibroid     C NONSPECIFIC PROCEDURE      rt breast biopsy     DILATION AND CURETTAGE, HYSTEROSCOPY DIAGNOSTIC, COMBINED N/A 2015    Procedure: COMBINED DILATION AND CURETTAGE, HYSTEROSCOPY DIAGNOSTIC;  Surgeon: Chelle Rebollar MD;  Location: [facility]     GI SURGERY       GYN SURGERY       HC COLONOSCOPY THRU STOMA, DIAGNOSTIC       Allergies   Allergen Reactions     Alcohol Other (See Comments)     flushing     Fentanyl Nausea and Vomiting     Food      Corn, mushrooms, all peppers     Morphine Nausea and Vomiting     vomiting     Penicillins Hives     Shellfish Allergy Hives     Hives over entire body     Prior to Admission medications    Medication Sig Start Date End Date Taking? Authorizing Provider   desvenlafaxine succinate (PRISTIQ) 25 MG 24 hr tablet TAKE 1 TABLET BY MOUTH DAILY 3/19/19  Yes Rianna Sim MD   diphenhydrAMINE (BENADRYL) 25 MG capsule Take 25-50 mg by mouth  daily Post nasal drip causing esophageal spasms 5/18/11  Yes Zbigniew Spangler MD   estradiol (ESTRACE) 2 MG tablet Take 2 mg by mouth daily   Yes Reported, Patient   folic acid (FOLVITE) 1 MG tablet TAKE 1 TABLET BY MOUTH EVERY DAY. 4/19/16  Yes Rianna Sim MD   levocetirizine (XYZAL) 5 MG tablet Take 5 mg by mouth every evening   Yes Reported, Patient   Magnesium 500 MG CAPS Take 500 mg by mouth daily   Yes Reported, Patient   methotrexate 2.5 MG tablet CHEMO Take 12.5 mg by mouth every 7 days (5 x 2.5 mg = 12.5 mg dose) 8/1/18  Yes Reported, Patient   methylcellulose (CITRUCEL) powder Take 3 teaspoonful by mouth daily (One Glass)   Yes Reported, Patient   NIFEdipine ER OSMOTIC (NIFEDICAL XL) 60 MG 24 hr tablet Take 60 mg by mouth daily   Yes Reported, Patient   Nutritional Supplements (NUTRITIONAL SUPPLEMENT PO) Take 2 capsules by mouth daily GABINO HYDRATE SUPPLEMENT 600 mg   Yes Reported, Patient   PROAIR RESPICLICK 108 (90 Base) MCG/ACT inhaler INHALE 2 PUFFS INTO THE LUNGS EVERY 6 HOURS AS NEEDED FOR SHORTNESS OF BREATH OR DIFFICULT BREATHING OR WHEEZING 3/19/19  Yes Rianna Sim MD   Probiotic Product (PROBIOTIC DAILY PO) Take 1 capsule by mouth daily 69 mg/ 15 Billion CFU   Yes Reported, Patient   psyllium 0.52 G capsule Take 1 capsule by mouth daily (Fiber)   Yes Reported, Patient   REMICADE 100 MG injection Inject into the vein every 2 months  7/11/18  Yes Reported, Patient   Vitamin D, Cholecalciferol, 1000 units TABS Take 1,000 Units by mouth daily   Yes Reported, Patient   acyclovir (ZOVIRAX) 5 % cream Apply topically 5 times daily Apply to cold sores/fever blisters 8/24/18   Rianna Sim MD   albuterol (PROAIR HFA, PROVENTIL HFA, VENTOLIN HFA) 108 (90 BASE) MCG/ACT inhaler Inhale 2 puffs into the lungs every 6 hours as needed for shortness of breath / dyspnea or wheezing    Reported, Patient   ALPRAZolam (XANAX) 0.25 MG tablet TAKE TWO TABLETS BY MOUTH THREE TIMES DAILY AS NEEDED  FOR ANXIETY. 3/14/19   Rianna Sim MD   ANUCORT-HC 25 MG Suppository UNW AND I 1 SUP REC  Q EVENING FOR 2 WEEKS 6/15/17   Reported, Patient   CANASA 1000 MG Suppository UNW AND I 1 SUP REC QD PRN 6/15/17   Reported, Patient   clidinium-chlordiazePOXIDE (LIBRAX) 5-2.5 MG CAPS per capsule TAKE ONE CAPSULE BY MOUTH DAILY AS NEEDED 8/24/18   Rianna Sim MD   CORTIFOAM 10 % rectal foam JESSICA REC BID FOR 14 DAYS 8/30/16   Reported, Patient   cyclobenzaprine (FLEXERIL) 10 MG tablet TAKE 1/2 TO 1 TABLET(5 TO 10 MG) BY MOUTH EVERY 8 HOURS AS NEEDED FOR MUSCLE SPASMS 8/10/18   Rianna Sim MD   hydrocortisone (CORTENEMA) 100 MG/60ML enema PLACE 1 ENEMA  BY RECTAL ROUTE QD FOR 2 WEEKS HS. 5/19/17   Reported, Patient   LEVSIN/SL 0.125 MG SL SUBL 1 TAB 4 TIMES DAILY, BEFORE MEALS AND AT BEDTIME 3/23/09   Rianna Sim MD   metroNIDAZOLE (METROGEL) 1 % gel Apply  topically 2 times daily. 8/10/18   Rianna Sim MD   order for DME Equipment being ordered: Medium compression stockings   20-30 mm 8/10/18   Rianna Sim MD   order for DME Equipment being ordered: Compression stockings  20-30 mm pressure 11/30/16   Rianna Sim MD   order for DME Equipment being ordered:  Compression pump  Flexitouch 4/15/16   Rianna Sim MD   progesterone (PROMETRIUM) 200 MG capsule  7/30/18   Reported, Patient   vitamin D (ERGOCALCIFEROL) 38525 UNIT capsule  8/6/18   Reported, Patient     RECENT LABS:   ECG:   ECHO:   CXR:      Anesthesia Evaluation     . Pt has had prior anesthetic. Type: General    History of anesthetic complications   - PONV        ROS/MED HX    ENT/Pulmonary:     (+)sleep apnea, allergic rhinitis, Mild Persistent asthma , . .   (-) COPD   Neurologic:     (+)other neuro vertigo   (-) seizures, Neuropathy and migraines   Cardiovascular:     (+) hypertension----. : . . . :. .      (-) CAD, arrhythmias, valvular problems/murmurs and dyslipidemia   METS/Exercise Tolerance:  >4 METS   Hematologic:        (-) history of  "blood clots, anemia and other hematologic disorder   Musculoskeletal:        (-) arthritis   GI/Hepatic:     (+) Inflammatory bowel disease (ulcerative colitis), Other GI/Hepatic hx esophageal spasm s/p myotomy     (-) GERD and liver disease   Renal/Genitourinary:      (-) renal disease and nephrolithiasis   Endo:     (+) Obesity, .   (-) Type I DM, Type II DM and thyroid disease   Psychiatric:     (+) psychiatric history anxiety      Infectious Disease:        (-) Recent Fever   Malignancy:         Other:                          Physical Exam  Normal systems: cardiovascular, pulmonary and dental    Airway   Mallampati: III  TM distance: >3 FB  Neck ROM: limited    Dental     Cardiovascular   Rhythm and rate: regular and normal  (-) no murmur    Pulmonary    breath sounds clear to auscultation            Lab Results   Component Value Date    WBC 10.9 06/16/2017    HGB 15.6 05/08/2019    HCT 35.8 06/16/2017     06/16/2017    CRP 0.17 08/25/2004    SED 13 08/25/2004     06/16/2017    POTASSIUM 3.9 05/08/2019    CHLORIDE 107 06/16/2017    CO2 19 (L) 06/16/2017    BUN 10 06/16/2017    CR 0.83 05/08/2019     (H) 06/16/2017    YULISA 7.8 (L) 06/16/2017    ALBUMIN 4.2 04/15/2016    PROTTOTAL 8.2 04/15/2016    ALT 16 04/15/2016    AST 16 04/15/2016    ALKPHOS 78 04/15/2016    BILITOTAL 0.4 04/15/2016    TSH 1.26 08/21/2015       Preop Vitals  BP Readings from Last 3 Encounters:   05/08/19 107/68   08/24/18 100/63   08/10/18 99/61    Pulse Readings from Last 3 Encounters:   05/08/19 94   08/24/18 87   08/10/18 82      Resp Readings from Last 3 Encounters:   06/23/17 18   06/16/17 20   08/24/15 20    SpO2 Readings from Last 3 Encounters:   05/08/19 96%   08/24/18 96%   08/10/18 96%      Temp Readings from Last 1 Encounters:   05/08/19 36.6  C (97.8  F) (Tympanic)    Ht Readings from Last 1 Encounters:   05/08/19 1.549 m (5' 1\")      Wt Readings from Last 1 Encounters:   05/08/19 88.5 kg (195 lb)    Estimated " "body mass index is 36.84 kg/m  as calculated from the following:    Height as of 5/8/19: 1.549 m (5' 1\").    Weight as of 5/8/19: 88.5 kg (195 lb).       Anesthesia Plan      History & Physical Review      ASA Status:  2 .    NPO Status:  > 8 hours    Plan for General and LMA with Propofol induction. Maintenance will be TIVA.    PONV prophylaxis:  Ondansetron (or other 5HT-3), Dexamethasone or Solumedrol and Aprepitant  Pre-op tylenol, hydroxyzine  Propofol infusion  0.5 mg hydromorphone at induction, avoid fentanyl  Toradol      Postoperative Care  Postoperative pain management:  Multi-modal analgesia.      Consents  Anesthetic plan, risks, benefits and alternatives discussed with:  Patient..                 Kyung Mota MD  "

## 2019-05-13 NOTE — ANESTHESIA CARE TRANSFER NOTE
Patient: John Steven    Procedure(s):  HYSTEROSCOPY, WITH DILATION AND CURETTAGE OF UTERUS USING MORCELLATOR (MYOSURE)    Diagnosis: POST MENOPAUSAL BLEEDING  Diagnosis Additional Information: No value filed.    Anesthesia Type:   General, LMA     Note:  Airway :Face Mask  Patient transferred to:PACU  Handoff Report: Identifed the Patient, Identified the Reponsible Provider, Reviewed the pertinent medical history, Discussed the surgical course, Reviewed Intra-OP anesthesia mangement and issues during anesthesia, Set expectations for post-procedure period and Allowed opportunity for questions and acknowledgement of understanding      Vitals: (Last set prior to Anesthesia Care Transfer)    CRNA VITALS  5/13/2019 1014 - 5/13/2019 1051      5/13/2019             Pulse:  93    SpO2:  97 %    Resp Rate (observed):  10                Electronically Signed By: ALFREDO Mcnamara CRNA  May 13, 2019  10:51 AM

## 2019-05-13 NOTE — ANESTHESIA POSTPROCEDURE EVALUATION
Patient: John Steven    Procedure(s):  HYSTEROSCOPY, WITH DILATION AND CURETTAGE OF UTERUS USING MORCELLATOR (MYOSURE)    Diagnosis:POST MENOPAUSAL BLEEDING  Diagnosis Additional Information: No value filed.    Anesthesia Type:  General, LMA    Note:  Anesthesia Post Evaluation    Patient location during evaluation: PACU  Patient participation: Able to fully participate in evaluation  Level of consciousness: awake and alert  Pain management: adequate  Airway patency: patent  Cardiovascular status: acceptable  Respiratory status: acceptable  Hydration status: acceptable  PONV: none     Anesthetic complications: None          Last vitals:  Vitals:    05/13/19 1115 05/13/19 1130 05/13/19 1213   BP: 106/59 103/60 113/68   Pulse: 85 79    Resp: 13 11 12   Temp: 35.3  C (95.5  F) 35.5  C (95.9  F)    SpO2: 95% 98% 98%         Electronically Signed By: Kyung Mota MD  May 13, 2019  12:39 PM

## 2019-05-13 NOTE — DISCHARGE INSTRUCTIONS
Today you were given 1,000 mg of Tylenol at 9:20 am. The recommended daily maximum dose is 4000 mg.       Same Day Surgery Discharge Instructions for  Sedation and General Anesthesia       It's not unusual to feel dizzy, light-headed or faint for up to 24 hours after surgery or while taking pain medication.  If you have these symptoms: sit for a few minutes before standing and have someone assist you when you get up to walk or use the bathroom.      You should rest and relax for the next 24 hours. We recommend you make arrangements to have an adult stay with you for at least 24 hours after your discharge.  Avoid hazardous and strenuous activity.      DO NOT DRIVE any vehicle or operate mechanical equipment for 24 hours following the end of your surgery.  Even though you may feel normal, your reactions may be affected by the medication you have received.      Do not drink alcoholic beverages for 24 hours following surgery.       Slowly progress to your regular diet as you feel able. It's not unusual to feel nauseated and/or vomit after receiving anesthesia.  If you develop these symptoms, drink clear liquids (apple juice, ginger ale, broth, 7-up, etc. ) until you feel better.  If your nausea and vomiting persists for 24 hours, please notify your surgeon.        All narcotic pain medications, along with inactivity and anesthesia, can cause constipation. Drinking plenty of liquids and increasing fiber intake will help.      For any questions of a medical nature, call your surgeon.      Do not make important decisions for 24 hours.      If you had general anesthesia, you may have a sore throat for a couple of days related to the breathing tube used during surgery.  You may use Cepacol lozenges to help with this discomfort.  If it worsens or if you develop a fever, contact your surgeon.       If you feel your pain is not well managed with the pain medications prescribed by your surgeon, please contact your surgeon's  office to let them know so they can address your concerns.       HOME CARE FOLLOWING HYSTEROSCOPY    Diet  You have no restrictions on your diet.  During the evening following surgery, drink plenty of fluids and eat a light supper.    Nausea  The anesthesia may produce some nausea.  If you feel nauseated, stay in bed and try drinking fluids such as 7-Up, tea, or soup.    Discomfort  The amount of discomfort you can expect is very unpredictable.  If you have pain that cannot be controlled with Tylenol or with the prescription you may have received, you should notify your physician.  Abdominal cramping or low backache is not uncommon and should not be a cause for concern.  You will be drowsy and weak the day of surgery and possibly the following day.  Fever  A low grade fever (not over 100 F) is usual after this procedure.  Do not hesitate to notify your physician if your fever seems excessive or persists.    Activity   You may resume your normal activity.  Avoid heavy lifting for one week.  You may bathe or shower.  Do not douche, use tampons, or resume intercourse for 1 week.    Emergency Care  Contact your physician if you have any of these problems:   1.  A fever over 100 F   2.  A large amount of bleeding or drainage   3.  Severe pain           **If you have questions or concerns about your procedure,   Call Dr Rebollar at [phone]**

## 2019-05-13 NOTE — OP NOTE
Procedure Date: 05/13/2019      PREOPERATIVE DIAGNOSIS:  Postmenopausal bleeding on unopposed estrogen.      POSTOPERATIVE DIAGNOSIS:  Postmenopausal bleeding on unopposed estrogen.  Possible polyp on endometrial biopsy.      PROCEDURES PERFORMED:  Dilatation and curettage, hysteroscopy with MyoSure morcellator.      SURGEON:  Chelle Rebollar MD      ANESTHESIA:  General.      ESTIMATED BLOOD LOSS:  5 mL.      DEFICIT:  360 mL of normal saline.      SPECIMENS:  Endometrial samplings.      DESCRIPTION OF PROCEDURE:  The patient was brought to the operative suite where she was placed in a supine position and general anesthesia was administered.  She was then placed in the dorsal lithotomy position, examined under anesthesia.  Uterus was upper limits of normal size, adnexa nonpalpable.  The patient was prepped and draped.  A timeout was performed.  She had just emptied her bladder before she went into the OR.  Speculum was placed in the vagina.  Anterior lip of the cervix was grasped with a tenaculum.  The uterus sounded to 8 cm.  The cervix was dilated to a #7 Hegar dilator.  The hysteroscope was placed and there was irregular shaggy tissue that had anterior to posterior connections and made viewing the tubal ostia difficult.  I was able to visualize the left tubal ostia.  The morcellator was placed and this tissue was removed with the MyoSure morcellator.  No obvious polyp or fibroid was noted.  The cavity was cleaned out completely with the MyoSure morcellator.        The hysteroscope was removed and a sharp curet was used to ensure that no further tissue was obtained.  It was clean.  The tenaculum was removed and silver nitrate was placed in the tenaculum sites for hemostasis and the speculum was removed.  Instrument, sponge and needle counts were correct.  The patient went to the recovery room in stable condition.         CHELLE REBOLLAR MD             D: 05/13/2019   T: 05/13/2019   MT: CRISS      Name:     ANJUM  DELIA   MRN:      [id number]        Account:        [id number]   :      1960           Procedure Date: 2019      Document: [id number]       cc: Chelle Rebollar MD

## 2019-05-14 LAB — COPATH REPORT: NORMAL

## 2019-05-24 ENCOUNTER — TRANSFERRED RECORDS (OUTPATIENT)
Dept: HEALTH INFORMATION MANAGEMENT | Facility: CLINIC | Age: 59
End: 2019-05-24

## 2019-06-07 ENCOUNTER — TRANSFERRED RECORDS (OUTPATIENT)
Dept: HEALTH INFORMATION MANAGEMENT | Facility: CLINIC | Age: 59
End: 2019-06-07

## 2019-06-07 ENCOUNTER — MEDICAL CORRESPONDENCE (OUTPATIENT)
Dept: HEALTH INFORMATION MANAGEMENT | Facility: CLINIC | Age: 59
End: 2019-06-07

## 2019-07-17 ENCOUNTER — MEDICAL CORRESPONDENCE (OUTPATIENT)
Dept: HEALTH INFORMATION MANAGEMENT | Facility: CLINIC | Age: 59
End: 2019-07-17

## 2019-07-17 RX ORDER — CEFAZOLIN SODIUM 2 G/100ML
2 INJECTION, SOLUTION INTRAVENOUS
Status: CANCELLED | OUTPATIENT
Start: 2019-07-17

## 2019-07-17 RX ORDER — CEFAZOLIN SODIUM 1 G/3ML
1 INJECTION, POWDER, FOR SOLUTION INTRAMUSCULAR; INTRAVENOUS SEE ADMIN INSTRUCTIONS
Status: CANCELLED | OUTPATIENT
Start: 2019-07-17

## 2019-07-18 DIAGNOSIS — Z01.818 PRE-OP EXAM: Primary | ICD-10-CM

## 2019-07-24 ENCOUNTER — MEDICAL CORRESPONDENCE (OUTPATIENT)
Dept: HEALTH INFORMATION MANAGEMENT | Facility: CLINIC | Age: 59
End: 2019-07-24

## 2019-07-24 ENCOUNTER — TRANSFERRED RECORDS (OUTPATIENT)
Dept: HEALTH INFORMATION MANAGEMENT | Facility: CLINIC | Age: 59
End: 2019-07-24

## 2019-08-09 ENCOUNTER — OFFICE VISIT (OUTPATIENT)
Dept: FAMILY MEDICINE | Facility: CLINIC | Age: 59
End: 2019-08-09
Payer: COMMERCIAL

## 2019-08-09 VITALS
TEMPERATURE: 98.1 F | WEIGHT: 195 LBS | BODY MASS INDEX: 36.82 KG/M2 | SYSTOLIC BLOOD PRESSURE: 104 MMHG | HEIGHT: 61 IN | DIASTOLIC BLOOD PRESSURE: 69 MMHG | OXYGEN SATURATION: 96 % | HEART RATE: 97 BPM

## 2019-08-09 DIAGNOSIS — F43.9 STRESS: ICD-10-CM

## 2019-08-09 DIAGNOSIS — R93.89 THICKENED ENDOMETRIUM: ICD-10-CM

## 2019-08-09 DIAGNOSIS — K51.90 ULCERATIVE COLITIS WITHOUT COMPLICATIONS, UNSPECIFIED LOCATION (H): ICD-10-CM

## 2019-08-09 DIAGNOSIS — T17.500A MUCUS PLUGGING OF BRONCHI: ICD-10-CM

## 2019-08-09 DIAGNOSIS — R42 DIZZINESS AND GIDDINESS: ICD-10-CM

## 2019-08-09 DIAGNOSIS — Z01.818 PREOP GENERAL PHYSICAL EXAM: Primary | ICD-10-CM

## 2019-08-09 DIAGNOSIS — M94.9 DISORDER OF BONE AND CARTILAGE: ICD-10-CM

## 2019-08-09 DIAGNOSIS — F41.8 SITUATIONAL ANXIETY: ICD-10-CM

## 2019-08-09 DIAGNOSIS — M89.9 DISORDER OF BONE AND CARTILAGE: ICD-10-CM

## 2019-08-09 DIAGNOSIS — K22.9 DISORDER OF ESOPHAGUS: ICD-10-CM

## 2019-08-09 PROCEDURE — 99215 OFFICE O/P EST HI 40 MIN: CPT | Performed by: INTERNAL MEDICINE

## 2019-08-09 ASSESSMENT — MIFFLIN-ST. JEOR: SCORE: 1396.89

## 2019-08-09 NOTE — PROGRESS NOTES
[address] [address]  [phone]  Dept: [phone]    PRE-OP EVALUATION:  Today's date: 2019    John Steven (: 1960) presents for pre-operative evaluation assessment as requested by Dr. Rebollar, Chelle Moran and Briana Leone  .  She requires evaluation and anesthesia risk assessment prior to undergoing surgery/procedure for treatment of  menorrrhagia .    Proposed Surgery/ Procedure: TOTAL LAPAROSCOPIC HYSTERECTOMY, BILATERAL SALPINGO OOPHORECTOMY  Date of Surgery/ Procedure: 2019  Time of Surgery/ Procedure: 11:00 AM  Hospital/Surgical Facility:  OR  Fax number for surgical facility: [facility]  Primary Physician: Rianna Sim  Type of Anesthesia Anticipated: General    Patient has a Health Care Directive or Living Will:  NO    1. NO - Do you have a history of heart attack, stroke, stent, bypass or surgery on an artery in the head, neck, heart or legs?  2. NO - Do you ever have any pain or discomfort in your chest?  3. NO - Do you have a history of  Heart Failure?  4. NO - Are you troubled by shortness of breath when: walking on the level, up a slight hill or at night?  5. NO - Do you currently have a cold, bronchitis or other respiratory infection?  6. YES - Do you have a cough, shortness of breath or wheezing?  7. YES - Do you sometimes get pains in the calves of your legs when you walk?  8. YES - Do you or anyone in your family have previous history of blood clots?  9. NO - Do you or does anyone in your family have a serious bleeding problem such as prolonged bleeding following surgeries or cuts?  10. YES - Have you ever had problems with anemia or been told to take iron pills?  11. YES  - Have you had any abnormal blood loss such as black, tarry or bloody stools, or abnormal vaginal bleeding?  12. NO - Have you ever had a blood transfusion?  13. YES - Have you or any of your relatives ever had problems with anesthesia?  14. NO - Do you  have sleep apnea, excessive snoring or daytime drowsiness?  15. NO - Do you have any prosthetic heart valves?  16. NO - Do you have prosthetic joints?  17. NO - Is there any chance that you may be pregnant?      HPI:     HPI related to upcoming procedure: patient has Ulcerative colitis  She is on Remicade and daily methotrexate  She also has Esophageal issues  She is on Nifedipine  She has cough and brings up mucus plugs  She does not have wheezing but at times feels a bit short of breath  She had esophageal surgery done in the past      MEDICAL HISTORY:     Patient Active Problem List    Diagnosis Date Noted     Immunocompromised (H) 08/10/2018     Priority: Medium     Obesity      Priority: Medium     Ulcerative colitis (H)      Priority: Medium     Problem list name updated by automated process. Provider to review       Other urethritis(597.89)      Priority: Medium     Other and unspecified hormones and synthetic substitutes causing adverse effect in therapeutic use      Priority: Medium     Allergic rhinitis      Priority: Medium     Problem list name updated by automated process. Provider to review       Disorder of esophagus      Priority: Medium     Achalasia, spasm  Problem list name updated by automated process. Provider to review       Rosacea      Priority: Medium     Dizziness and giddiness      Priority: Medium     Other specified disorder of rectum and anus 07/25/2006     Priority: Medium      Past Medical History:   Diagnosis Date     Allergic rhinitis, cause unspecified      Dizziness and giddiness      Esophageal spasm 2003    nifedipine, s/p heller myotomy     Immunocompromised (H) 8/10/2018     Macrocytosis      Obesity      Other and unspecified hormones and synthetic substitutes causing adverse effect in therapeutic use     On BCP, Dr Mijares     Other urethritis(597.89)      PONV (postoperative nausea and vomiting)      Rosacea      Travel     valium,xanax narcotic agreement     Ulcerative  colitis, unspecified     F/B [facility]     Uncomplicated asthma      Unspecified disorder of esophagus     Achalasia, spasm     Vertigo      Past Surgical History:   Procedure Laterality Date     BIOPSY      BREAST     C NONSPECIFIC PROCEDURE      Heller myotomy     C NONSPECIFIC PROCEDURE      EB for spotting,fibroid     C NONSPECIFIC PROCEDURE      rt breast biopsy     DILATION AND CURETTAGE, HYSTEROSCOPY DIAGNOSTIC, COMBINED N/A 2015    Procedure: COMBINED DILATION AND CURETTAGE, HYSTEROSCOPY DIAGNOSTIC;  Surgeon: Chelle Rebollar MD;  Location:  [facility]     DILATION AND CURETTAGE, OPERATIVE HYSTEROSCOPY WITH MORCELLATOR, COMBINED N/A 2019    Procedure: HYSTEROSCOPY, WITH DILATION AND CURETTAGE OF UTERUS USING MORCELLATOR (MYOSURE);  Surgeon: Chelle Rebollar MD;  Location:  OR     GI SURGERY       GYN SURGERY       x3     HC COLONOSCOPY THRU STOMA, DIAGNOSTIC       Current Outpatient Medications   Medication Sig Dispense Refill     acyclovir (ZOVIRAX) 5 % cream Apply topically 5 times daily Apply to cold sores/fever blisters (Patient taking differently: Apply topically 5 times daily Apply to cold sores/fever blisters as needed) 5 g 3     albuterol (PROAIR HFA, PROVENTIL HFA, VENTOLIN HFA) 108 (90 BASE) MCG/ACT inhaler Inhale 2 puffs into the lungs every 6 hours as needed for shortness of breath / dyspnea or wheezing       ALPRAZolam (XANAX) 0.25 MG tablet TAKE TWO TABLETS BY MOUTH THREE TIMES DAILY AS NEEDED FOR ANXIETY. 45 tablet 0     clidinium-chlordiazePOXIDE (LIBRAX) 5-2.5 MG CAPS per capsule TAKE ONE CAPSULE BY MOUTH DAILY AS NEEDED 30 capsule 12     CORTIFOAM 10 % rectal foam JESSICA REC BID FOR 14 DAYS  0     cyclobenzaprine (FLEXERIL) 10 MG tablet TAKE 1/2 TO 1 TABLET(5 TO 10 MG) BY MOUTH EVERY 8 HOURS AS NEEDED FOR MUSCLE SPASMS 30 tablet 3     desvenlafaxine succinate (PRISTIQ) 25 MG 24 hr tablet TAKE 1 TABLET BY MOUTH DAILY 60 tablet 3     diphenhydrAMINE (BENADRYL)  25 MG capsule Take 25-50 mg by mouth daily Post nasal drip causing esophageal spasms       estradiol (ESTRACE) 2 MG tablet Take 2 mg by mouth daily       folic acid (FOLVITE) 1 MG tablet TAKE 1 TABLET BY MOUTH EVERY DAY. 90 tablet 3     levocetirizine (XYZAL) 5 MG tablet Take 5 mg by mouth every evening       LEVSIN/SL 0.125 MG SL SUBL 1 TAB 4 TIMES DAILY, BEFORE MEALS AND AT BEDTIME 30 12     Magnesium 500 MG CAPS Take 500 mg by mouth daily       methotrexate 2.5 MG tablet CHEMO Take 12.5 mg by mouth every 7 days (5 x 2.5 mg = 12.5 mg dose)       metroNIDAZOLE (METROGEL) 1 % gel Apply  topically 2 times daily. 30 g 12     NIFEdipine ER OSMOTIC (NIFEDICAL XL) 60 MG 24 hr tablet Take 60 mg by mouth daily       Nutritional Supplements (NUTRITIONAL SUPPLEMENT PO) Take 2 capsules by mouth daily GABINO HYDRATE SUPPLEMENT 600 mg       order for DME Equipment being ordered: Medium compression stockings   20-30 mm 2 each 1     order for DME Equipment being ordered: Compression stockings  20-30 mm pressure 2 each 3     order for DME Equipment being ordered:  Compression pump  Flexitouch 1 each 0     PROAIR RESPICLICK 108 (90 Base) MCG/ACT inhaler INHALE 2 PUFFS INTO THE LUNGS EVERY 6 HOURS AS NEEDED FOR SHORTNESS OF BREATH OR DIFFICULT BREATHING OR WHEEZING 1 each 11     Probiotic Product (PROBIOTIC DAILY PO) Take 1 capsule by mouth daily 69 mg/ 15 Billion CFU       progesterone (PROMETRIUM) 200 MG capsule Take by mouth daily 1 tablet       psyllium 0.52 G capsule Take 1 capsule by mouth daily (Fiber)       REMICADE 100 MG injection Inject into the vein every 2 months        Vitamin D, Cholecalciferol, 1000 units TABS Take 1,000 Units by mouth daily       OTC products: None, except as noted above    Allergies   Allergen Reactions     Alcohol Other (See Comments)     flushing     Fentanyl Nausea and Vomiting     Food      Corn, mushrooms, all peppers     Morphine Nausea and Vomiting     vomiting     Penicillins Hives      "Shellfish Allergy Hives     Hives over entire body      Latex Allergy: NO    Social History     Tobacco Use     Smoking status: Former Smoker     Packs/day: 1.00     Years: 10.00     Pack years: 10.00     Types: Cigarettes     Last attempt to quit: 1989     Years since quittin.6     Smokeless tobacco: Never Used   Substance Use Topics     Alcohol use: Yes     Alcohol/week: 0.0 oz     Comment: 2-3 drinks  times a week     History   Drug Use No       REVIEW OF SYSTEMS:   10 point ROS of systems including Constitutional, Eyes, Respiratory, Cardiovascular, Gastroenterology, Genitourinary, Integumentary, Muscularskeletal, Psychiatric were all negative except for pertinent positives noted in my HPI.    EXAM:   /69 (BP Location: Left arm, Patient Position: Sitting, Cuff Size: Adult Large)   Pulse 97   Temp 98.1  F (36.7  C) (Oral)   Ht 1.549 m (5' 1\")   Wt 88.5 kg (195 lb)   SpO2 96%   BMI 36.84 kg/m    GENERAL APPEARANCE: healthy, alert and no distress  HENT: ear canals and TM's normal and nose and mouth without ulcers or lesions  RESP: lungs clear to auscultation - no rales, rhonchi or wheezes  CV: regular rate and rhythm, normal S1 S2, no S3 or S4 and no murmur, click or rub   ABDOMEN: soft, nontender, no HSM or masses and bowel sounds normal   section wound scar is noticed     nEURO: Normal strength and tone, sensory exam grossly normal, mentation intact and speech normal    DIAGNOSTICS:   EKG: appears normal, NSR, normal axis, normal intervals, no acute ST/T changes c/w ischemia, no LVH by voltage criteria, unchanged from previous tracings    Done 2019  Recent Labs   Lab Test 19  0936 17  1635 04/15/16  1034  13  0817   HGB 15.6 12.1 13.1   < >  --    PLT  --  303 257   < >  --    NA  --  138 139   < >  --    POTASSIUM 3.9 3.4 4.4   < >  --    CR 0.83 0.81 0.88   < >  --    A1C  --   --   --   --  4.4    < > = values in this interval not displayed.        IMPRESSION: "   Reason for surgery/procedure: ARVIN and BSO  Diagnosis/reason for consult: medical    The proposed surgical procedure is considered INTERMEDIATE risk.    REVISED CARDIAC RISK INDEX  The patient has the following serious cardiovascular risks for perioperative complications such as (MI, PE, VFib and 3  AV Block):  No serious cardiac risks  INTERPRETATION: 1 risks: Class II (low risk - 0.9% complication rate)    The patient has the following additional risks for perioperative complications:  No identified additional risks      ICD-10-CM    1. Preop general physical exam Z01.818 CBC with platelets   2. Thickened endometrium R93.89 CBC with platelets   3. Ulcerative colitis without complications, unspecified location (H) K51.90 CBC with platelets   4. Situational anxiety F41.8    5. Mucus plugging of bronchi J98.09 SPEECH THERAPY REFERRAL   6. Disorder of esophagus K22.9 SPEECH THERAPY REFERRAL   7. Stress F43.9    8. Disorder of bone and cartilage M89.9     M94.9    9. Dizziness and giddiness R42    Patient is high risk for infection and therefore should receive perioperative antibiotic prophylaxis  Please note that she gets hives from penicillin  In addition I would like to do a video swallow prior to anesthesia  Although she does not choke on food, the mucus plugging could be some evidence of aspiration  She does not have any evidence of CHF    She is otherwise stable in regards to anxiety but is at high risk of DVT because of estrogens and ulcerative colitis also increases the risk of DVT  Perioperative DVT prophylaxis should be concerned    RECOMMENDATIONS:         --Patient is to take all scheduled medications on the day of surgery EXCEPT for modifications listed below.    Patient Instructions     Before Your Surgery      Call your surgeon if there is any change in your health. This includes signs of a cold or flu (such as a sore throat, runny nose, cough, rash or fever).    Stop provera post operatively  Discuss  with surgeon to reduce Estrace dose  Please stop Methotrexate the day of surgery and take post operatively 3 days after surgery  Infusion with Remicade 8 days after surgery    No Vitamins or supplements 3 days before surgery         APPROVAL GIVEN to proceed with proposed procedure, without further diagnostic evaluation  Only video swallow will be done as mentioned above4       Signed Electronically by: Rianna Sim MD    Copy of this evaluation report is provided to requesting physician.    [facility] Preop Guidelines    Revised Cardiac Risk Index

## 2019-08-09 NOTE — Clinical Note
Pap smear done on  this date: Jully 2018 (approximately), by thisgroup: Dr Camilo, results were normal

## 2019-08-09 NOTE — PATIENT INSTRUCTIONS
Before Your Surgery      Call your surgeon if there is any change in your health. This includes signs of a cold or flu (such as a sore throat, runny nose, cough, rash or fever).    Stop provera post operatively  Discuss with surgeon to reduce Estrace dose  Please stop Methotrexate the day of surgery and take post operatively 3 days after surgery  Infusion with Remicade 8 days after surgery    No Vitamins or supplements 3 days before surgery

## 2019-08-28 ENCOUNTER — HOSPITAL ENCOUNTER (OUTPATIENT)
Dept: SPEECH THERAPY | Facility: CLINIC | Age: 59
Setting detail: THERAPIES SERIES
End: 2019-08-28
Attending: INTERNAL MEDICINE
Payer: COMMERCIAL

## 2019-08-28 ENCOUNTER — TELEPHONE (OUTPATIENT)
Dept: FAMILY MEDICINE | Facility: CLINIC | Age: 59
End: 2019-08-28

## 2019-08-28 ENCOUNTER — HOSPITAL ENCOUNTER (OUTPATIENT)
Dept: GENERAL RADIOLOGY | Facility: CLINIC | Age: 59
Discharge: HOME | End: 2019-08-28
Attending: INTERNAL MEDICINE | Admitting: INTERNAL MEDICINE
Payer: COMMERCIAL

## 2019-08-28 DIAGNOSIS — K22.9 DISORDER OF ESOPHAGUS: ICD-10-CM

## 2019-08-28 DIAGNOSIS — K22.9 DISORDER OF ESOPHAGUS: Primary | ICD-10-CM

## 2019-08-28 DIAGNOSIS — R13.10 DYSPHAGIA: ICD-10-CM

## 2019-08-28 DIAGNOSIS — T17.500A MUCUS PLUGGING OF BRONCHI: ICD-10-CM

## 2019-08-28 PROCEDURE — 74230 X-RAY XM SWLNG FUNCJ C+: CPT

## 2019-08-28 PROCEDURE — 92611 MOTION FLUOROSCOPY/SWALLOW: CPT | Mod: GN | Performed by: SPEECH-LANGUAGE PATHOLOGIST

## 2019-08-28 PROCEDURE — 92610 EVALUATE SWALLOWING FUNCTION: CPT | Mod: GN | Performed by: SPEECH-LANGUAGE PATHOLOGIST

## 2019-08-28 RX ORDER — BARIUM SULFATE 400 MG/ML
SUSPENSION ORAL ONCE
Status: COMPLETED | OUTPATIENT
Start: 2019-08-28 | End: 2019-08-28

## 2019-08-28 RX ADMIN — BARIUM SULFATE 10 ML: 400 SUSPENSION ORAL at 11:49

## 2019-08-28 NOTE — TELEPHONE ENCOUNTER
Reason for Call:  Other     Detailed comments: Tabitha/[facility]ech Pathology called stating that pt study is complete for review and pt still needs esophogram. Please call with any questions     Phone Number Patient can be reached at: Other phone number:  [phone]    Best Time: Anytime     Can we leave a detailed message on this number? YES    Call taken on 8/28/2019 at 12:33 PM by Devan Haro

## 2019-08-28 NOTE — PROGRESS NOTES
08/28/19 8166   General Information   Type Of Visit Initial   Start Of Care Date 08/28/19   Referring Physician Dr. Sim   Orders   (Video swallow study; No esophagram orders present)   Medical Diagnosis Dysphagia   Respiratory Status Room air   Patient/family Goals To assess swallow function and aspiration risk/aspiration risk for surgery.  (Recommend completion of esophagram if felt indicated by MD.)   General Information Comments Pt has a hx of achalasia s/p Botox and Heller Myotomy 18-20 years ago.  Pt also has esophageal spasms at times.  Pt feels gradual increase in esophageal dysphagia symptoms over the past few years.  Pt uses liquids to wash down food and has globus sensation at times.  She has had 1 episode of steak getting stuck and needing to be seen in the ER since her myotomy.  Pt reports coughing up phlegm and some undigested food at times, mostly when laying down at night.  Pt had a D&C 5/2019 with mucus plugs x 6 coughed up at home.  No recent pneumonias.  Pt reports allergies and frequent cough at baseline.   Fall Risk Screen   Fall screen comments See radiology staff documentation.   Clinical Swallow Evaluation   Oral Musculature generally intact   Dentition present and adequate   Laryngeal Function Cough;Throat clear;Swallow;Voicing initiated;Dry swallow palpated  (Mild hoarse voice, throat clear in conversation)   Additional evaluation(s) completed today Yes  (Clinical for interview, oral motor exam, thin water trials)   Clinical Swallow Eval: Thin Liquid Texture Trial   Mode of Presentation, Thin Liquids cup   Volume of Liquid or Food Presented sips x 3   Oral Phase of Swallow Premature pharyngeal entry   Pharyngeal Phase of Swallow reduction in laryngeal movement;repeated swallows  (slight delay)   Diagnostic Statement throat clear x 2   VFSS Eval: Radiology   Radiologist Dr. Mcgovern   Views Taken left lateral   Physical Location of Procedure [facility]   VFSS Eval: Thin Liquid Texture Trial   Mode  of Presentation, Thin Liquid cup;straw   Order of Presentation 1, 2, 3, 4   Preparatory Phase Poor bolus control   Oral Phase, Thin Liquid Premature pharyngeal entry   Pharyngeal Phase, Thin Liquid   (slight delay)   Rosenbek's Penetration Aspiration Scale: Thin Liquid Trial Results 1 - no aspiration, contrast does not enter airway   Diagnostic Statement Mild-min decreased epiglottic closure, mild tight UES, min to mild pharyngeal residue after 1st swallows at times, independent 2nd swallows clears residue, min-no residue at UES   VFSS Eval: Puree Solid Texture Trial   Mode of Presentation, Puree spoon   Order of Presentation 5   Preparatory Phase WFL   Oral Phase, Puree WFL   Pharyngeal Phase, Puree WFL   Rosenbek's Penetration Aspiration Scale: Puree Food Trial Results 1 - no aspiration, contrast does not enter airway   Diagnostic Statement Mild-min decreased epiglottic closure, mild tight UES, no pharyngeal residue, min-no residue at UES   VFSS Eval: Semisolid Texture Trial   Mode of Presentation, Semisolid spoon   Order of Presentation 6   Preparatory Phase WFL   Oral Phase, Semisolid WFL   Pharyngeal Phase, Semisolid WFL   Rosenbek's Penetration Aspiration Scale: Semisolid Food Trial Results 1 - no aspiration, contrast does not enter airway   Diagnostic Statement Mild-min decreased epiglottic closure, mild tight UES, no pharyngeal residue, min-no residue at UES   VFSS Eval: Solid Food Texture Trial   Mode of Presentation, Solid spoon   Order of Presentation 7   Preparatory Phase WFL   Oral Phase, Solid WFL   Pharyngeal Phase, Solid WFL   Rosenbek's Penetration Aspiration Scale: Solid Food Trial Results 1 - no aspiration, contrast does not enter airway   Diagnostic Statement Mild-min decreased epiglottic closure, mild tight UES, no pharyngeal residue, min-no residue at UES   Swallow Compensations   Swallow Compensations Reduce amounts;Pacing;Multiple swallow;Alternate viscosity of consistencies   Educational  Assessment   Barriers to Learning No barriers   Preferred Learning Style Listening;Pictures/video  (Pt verbalized understanding of education provided.)   Esophageal Phase of Swallow   Patient reports or presents with symptoms of esophageal dysphagia Yes   Esophageal sweep performed during today s vidofluoroscopic exam  Yes;Please refer to radiologist's report for details   Esophageal comments Full esophagram recommended.  Will defer to MD for additional orders as felt indicated.  Solid x 1 and nectar x 1 observed with brief esophageal screen.  See radiologist's report.   Swallow Eval: Clinical Impressions   Skilled Criteria for Therapy Intervention No problems identified which require skilled intervention   Functional Assessment Scale (FAS) 6   Dysphagia Outcome Severity Scale (SRIRAM) Level 6 - SRIRAM   Treatment Diagnosis minimal oral-pharyngeal dysphagia/WFL   Diet texture recommendations Regular diet;Thin liquids   Recommended Feeding/Eating Techniques   (see below)   Rehab Potential good, to achieve stated therapy goals   Risks and Benefits of Treatment have been explained. Yes   Patient, family and/or staff in agreement with Plan of Care Yes   Clinical Impression Comments Patient presents with minimal oral-pharyngeal dysphagia/function that is WFL during today's study.  Findings include slight swallow delay, mild-min decreased epiglottic closure, and mild tight UES.  Findings resulted in min to mild pharyngeal residue of thin lqiuids after 1st swallows at times (independent 2nd swallows clears residue) and min-no residue at UES.  No penetration/aspiration was observed across textures.  See radiologist's report for brief lower esophageal viewing.  Will defer to referring MD regarding esophagram completion for full esophageal assessment.  Radiology staff attempted to contact MD office at the time of the video swallow study, but did not receive a call back.  Patient to follow up with referring MD regarding completion  of esophagram/GI MD follow up as felt indicated.  Educated pt on general reflux/esophageal dysphagia safe swallow strategies including: sit up at 90 degrees, do not eat for 2+ hours prior to bedtime, small meals at a sitting, alternate textures, slow rate, multiple swallows.  Will defer to MD/possible GI MD consult for follow up on esophageal phase dysphagia.  MD clinic contacted and message left for MD regarding above.   Total Session Time   SLP Eval: oral/pharyngeal swallow function, clinical minutes (19470) 15   SLP Eval: VideoFluoroscopic Swallow function Minutes (56128) 15   Total Evaluation Time 30

## 2019-08-29 NOTE — TELEPHONE ENCOUNTER
"It looks like the order placed was \"WITHOUT\" esophogram.  Needs new order WITH esophogram. I pended for review and sign.    (RN should delete previous order from [facility] after hearing back from PCP.)      Thank you,  Marietta Fu RN on 8/29/2019 at 7:50 AM    "

## 2019-09-08 ENCOUNTER — HOSPITAL ENCOUNTER (OUTPATIENT)
Dept: LAB | Facility: CLINIC | Age: 59
Discharge: HOME | End: 2019-09-08
Attending: OBSTETRICS & GYNECOLOGY | Admitting: OBSTETRICS & GYNECOLOGY
Payer: COMMERCIAL

## 2019-09-08 DIAGNOSIS — Z01.818 PRE-OP EXAM: ICD-10-CM

## 2019-09-08 LAB
ABO + RH BLD: NORMAL
ABO + RH BLD: NORMAL
BLD GP AB SCN SERPL QL: NORMAL
BLOOD BANK CMNT PATIENT-IMP: NORMAL
SPECIMEN EXP DATE BLD: NORMAL

## 2019-09-08 PROCEDURE — 86901 BLOOD TYPING SEROLOGIC RH(D): CPT | Performed by: OBSTETRICS & GYNECOLOGY

## 2019-09-08 PROCEDURE — 36415 COLL VENOUS BLD VENIPUNCTURE: CPT | Performed by: OBSTETRICS & GYNECOLOGY

## 2019-09-08 PROCEDURE — 86850 RBC ANTIBODY SCREEN: CPT | Performed by: OBSTETRICS & GYNECOLOGY

## 2019-09-08 PROCEDURE — 86900 BLOOD TYPING SEROLOGIC ABO: CPT | Performed by: OBSTETRICS & GYNECOLOGY

## 2019-09-09 ENCOUNTER — ANESTHESIA EVENT (OUTPATIENT)
Dept: SURGERY | Facility: CLINIC | Age: 59
End: 2019-09-09
Payer: COMMERCIAL

## 2019-09-09 ENCOUNTER — ANESTHESIA (OUTPATIENT)
Dept: SURGERY | Facility: CLINIC | Age: 59
End: 2019-09-09
Payer: COMMERCIAL

## 2019-09-09 ENCOUNTER — HOSPITAL ENCOUNTER (OUTPATIENT)
Facility: CLINIC | Age: 59
Discharge: HOME | End: 2019-09-10
Attending: OBSTETRICS & GYNECOLOGY | Admitting: OBSTETRICS & GYNECOLOGY
Payer: COMMERCIAL

## 2019-09-09 DIAGNOSIS — Z98.890 POST-OPERATIVE STATE: ICD-10-CM

## 2019-09-09 DIAGNOSIS — G89.18 POST-OP PAIN: Primary | ICD-10-CM

## 2019-09-09 PROCEDURE — 36000063 ZZH SURGERY LEVEL 4 EA 15 ADDTL MIN: Performed by: OBSTETRICS & GYNECOLOGY

## 2019-09-09 PROCEDURE — 36000093 ZZH SURGERY LEVEL 4 1ST 30 MIN: Performed by: OBSTETRICS & GYNECOLOGY

## 2019-09-09 PROCEDURE — 25000128 H RX IP 250 OP 636: Performed by: NURSE ANESTHETIST, CERTIFIED REGISTERED

## 2019-09-09 PROCEDURE — 71000012 ZZH RECOVERY PHASE 1 LEVEL 1 FIRST HR: Performed by: OBSTETRICS & GYNECOLOGY

## 2019-09-09 PROCEDURE — 25800025 ZZH RX 258: Performed by: OBSTETRICS & GYNECOLOGY

## 2019-09-09 PROCEDURE — 37000008 ZZH ANESTHESIA TECHNICAL FEE, 1ST 30 MIN: Performed by: OBSTETRICS & GYNECOLOGY

## 2019-09-09 PROCEDURE — 25000125 ZZHC RX 250: Performed by: OBSTETRICS & GYNECOLOGY

## 2019-09-09 PROCEDURE — 40000170 ZZH STATISTIC PRE-PROCEDURE ASSESSMENT II: Performed by: OBSTETRICS & GYNECOLOGY

## 2019-09-09 PROCEDURE — 25000128 H RX IP 250 OP 636: Performed by: OBSTETRICS & GYNECOLOGY

## 2019-09-09 PROCEDURE — 25000125 ZZHC RX 250: Performed by: NURSE ANESTHETIST, CERTIFIED REGISTERED

## 2019-09-09 PROCEDURE — 25000132 ZZH RX MED GY IP 250 OP 250 PS 637: Performed by: ANESTHESIOLOGY

## 2019-09-09 PROCEDURE — 25000131 ZZH RX MED GY IP 250 OP 636 PS 637: Performed by: ANESTHESIOLOGY

## 2019-09-09 PROCEDURE — 25800030 ZZH RX IP 258 OP 636: Performed by: NURSE ANESTHETIST, CERTIFIED REGISTERED

## 2019-09-09 PROCEDURE — 27210794 ZZH OR GENERAL SUPPLY STERILE: Performed by: OBSTETRICS & GYNECOLOGY

## 2019-09-09 PROCEDURE — 88307 TISSUE EXAM BY PATHOLOGIST: CPT | Performed by: OBSTETRICS & GYNECOLOGY

## 2019-09-09 PROCEDURE — C1765 ADHESION BARRIER: HCPCS | Performed by: OBSTETRICS & GYNECOLOGY

## 2019-09-09 PROCEDURE — 37000009 ZZH ANESTHESIA TECHNICAL FEE, EACH ADDTL 15 MIN: Performed by: OBSTETRICS & GYNECOLOGY

## 2019-09-09 PROCEDURE — 88307 TISSUE EXAM BY PATHOLOGIST: CPT | Mod: 26 | Performed by: OBSTETRICS & GYNECOLOGY

## 2019-09-09 RX ORDER — KETOROLAC TROMETHAMINE 15 MG/ML
15 INJECTION, SOLUTION INTRAMUSCULAR; INTRAVENOUS EVERY 6 HOURS PRN
Qty: 1 ML | Refills: 0 | OUTPATIENT
Start: 2019-09-09 | End: 2019-09-10

## 2019-09-09 RX ORDER — ACETAMINOPHEN 325 MG/1
650 TABLET ORAL EVERY 6 HOURS PRN
Status: DISCONTINUED | OUTPATIENT
Start: 2019-09-09 | End: 2019-09-10 | Stop reason: HOSPADM

## 2019-09-09 RX ORDER — ONDANSETRON 4 MG/1
4 TABLET, ORALLY DISINTEGRATING ORAL EVERY 30 MIN PRN
Status: DISCONTINUED | OUTPATIENT
Start: 2019-09-09 | End: 2019-09-09 | Stop reason: HOSPADM

## 2019-09-09 RX ORDER — SODIUM CHLORIDE, SODIUM LACTATE, POTASSIUM CHLORIDE, CALCIUM CHLORIDE 600; 310; 30; 20 MG/100ML; MG/100ML; MG/100ML; MG/100ML
INJECTION, SOLUTION INTRAVENOUS CONTINUOUS PRN
Status: DISCONTINUED | OUTPATIENT
Start: 2019-09-09 | End: 2019-09-09

## 2019-09-09 RX ORDER — KETOROLAC TROMETHAMINE 30 MG/ML
INJECTION, SOLUTION INTRAMUSCULAR; INTRAVENOUS PRN
Status: DISCONTINUED | OUTPATIENT
Start: 2019-09-09 | End: 2019-09-09

## 2019-09-09 RX ORDER — LIDOCAINE HYDROCHLORIDE 20 MG/ML
INJECTION, SOLUTION INFILTRATION; PERINEURAL PRN
Status: DISCONTINUED | OUTPATIENT
Start: 2019-09-09 | End: 2019-09-09

## 2019-09-09 RX ORDER — HYDRALAZINE HYDROCHLORIDE 20 MG/ML
2.5-5 INJECTION INTRAMUSCULAR; INTRAVENOUS EVERY 10 MIN PRN
Status: DISCONTINUED | OUTPATIENT
Start: 2019-09-09 | End: 2019-09-09 | Stop reason: HOSPADM

## 2019-09-09 RX ORDER — SODIUM CHLORIDE, SODIUM LACTATE, POTASSIUM CHLORIDE, CALCIUM CHLORIDE 600; 310; 30; 20 MG/100ML; MG/100ML; MG/100ML; MG/100ML
INJECTION, SOLUTION INTRAVENOUS CONTINUOUS
Status: DISCONTINUED | OUTPATIENT
Start: 2019-09-09 | End: 2019-09-09 | Stop reason: HOSPADM

## 2019-09-09 RX ORDER — LIDOCAINE 40 MG/G
CREAM TOPICAL
Status: DISCONTINUED | OUTPATIENT
Start: 2019-09-09 | End: 2019-09-10 | Stop reason: HOSPADM

## 2019-09-09 RX ORDER — BUPIVACAINE HYDROCHLORIDE AND EPINEPHRINE 5; 5 MG/ML; UG/ML
INJECTION, SOLUTION PERINEURAL PRN
Status: DISCONTINUED | OUTPATIENT
Start: 2019-09-09 | End: 2019-09-09 | Stop reason: HOSPADM

## 2019-09-09 RX ORDER — MAGNESIUM HYDROXIDE 1200 MG/15ML
LIQUID ORAL PRN
Status: DISCONTINUED | OUTPATIENT
Start: 2019-09-09 | End: 2019-09-09 | Stop reason: HOSPADM

## 2019-09-09 RX ORDER — APREPITANT 40 MG/1
40 CAPSULE ORAL ONCE
Status: COMPLETED | OUTPATIENT
Start: 2019-09-09 | End: 2019-09-09

## 2019-09-09 RX ORDER — NALOXONE HYDROCHLORIDE 0.4 MG/ML
.1-.4 INJECTION, SOLUTION INTRAMUSCULAR; INTRAVENOUS; SUBCUTANEOUS
Status: DISCONTINUED | OUTPATIENT
Start: 2019-09-09 | End: 2019-09-10 | Stop reason: HOSPADM

## 2019-09-09 RX ORDER — DEXAMETHASONE SODIUM PHOSPHATE 4 MG/ML
INJECTION, SOLUTION INTRA-ARTICULAR; INTRALESIONAL; INTRAMUSCULAR; INTRAVENOUS; SOFT TISSUE PRN
Status: DISCONTINUED | OUTPATIENT
Start: 2019-09-09 | End: 2019-09-09

## 2019-09-09 RX ORDER — HYDROXYZINE HYDROCHLORIDE 25 MG/1
25 TABLET, FILM COATED ORAL ONCE
Status: COMPLETED | OUTPATIENT
Start: 2019-09-09 | End: 2019-09-09

## 2019-09-09 RX ORDER — FENTANYL CITRATE 50 UG/ML
25-50 INJECTION, SOLUTION INTRAMUSCULAR; INTRAVENOUS
Status: DISCONTINUED | OUTPATIENT
Start: 2019-09-09 | End: 2019-09-09 | Stop reason: HOSPADM

## 2019-09-09 RX ORDER — GLYCOPYRROLATE 0.2 MG/ML
INJECTION, SOLUTION INTRAMUSCULAR; INTRAVENOUS PRN
Status: DISCONTINUED | OUTPATIENT
Start: 2019-09-09 | End: 2019-09-09

## 2019-09-09 RX ORDER — CEFAZOLIN SODIUM 1 G/3ML
1 INJECTION, POWDER, FOR SOLUTION INTRAMUSCULAR; INTRAVENOUS SEE ADMIN INSTRUCTIONS
Status: DISCONTINUED | OUTPATIENT
Start: 2019-09-09 | End: 2019-09-09 | Stop reason: HOSPADM

## 2019-09-09 RX ORDER — IBUPROFEN 600 MG/1
600 TABLET, FILM COATED ORAL EVERY 6 HOURS PRN
Qty: 30 TABLET | Refills: 0 | Status: SHIPPED | OUTPATIENT
Start: 2019-09-09 | End: 2019-09-10

## 2019-09-09 RX ORDER — MECLIZINE HCL 12.5 MG 12.5 MG/1
12.5 TABLET ORAL ONCE
Status: COMPLETED | OUTPATIENT
Start: 2019-09-09 | End: 2019-09-09

## 2019-09-09 RX ORDER — NEOSTIGMINE METHYLSULFATE 1 MG/ML
VIAL (ML) INJECTION PRN
Status: DISCONTINUED | OUTPATIENT
Start: 2019-09-09 | End: 2019-09-09

## 2019-09-09 RX ORDER — ONDANSETRON 2 MG/ML
4 INJECTION INTRAMUSCULAR; INTRAVENOUS EVERY 30 MIN PRN
Status: DISCONTINUED | OUTPATIENT
Start: 2019-09-09 | End: 2019-09-09 | Stop reason: HOSPADM

## 2019-09-09 RX ORDER — ONDANSETRON 4 MG/1
4 TABLET, ORALLY DISINTEGRATING ORAL EVERY 6 HOURS PRN
Status: DISCONTINUED | OUTPATIENT
Start: 2019-09-09 | End: 2019-09-10 | Stop reason: HOSPADM

## 2019-09-09 RX ORDER — CEFAZOLIN SODIUM 2 G/100ML
2 INJECTION, SOLUTION INTRAVENOUS
Status: COMPLETED | OUTPATIENT
Start: 2019-09-09 | End: 2019-09-09

## 2019-09-09 RX ORDER — HYDROCODONE BITARTRATE AND ACETAMINOPHEN 5; 325 MG/1; MG/1
1-2 TABLET ORAL EVERY 4 HOURS PRN
Status: DISCONTINUED | OUTPATIENT
Start: 2019-09-09 | End: 2019-09-10 | Stop reason: HOSPADM

## 2019-09-09 RX ORDER — ACETAMINOPHEN 500 MG
1000 TABLET ORAL ONCE
Status: COMPLETED | OUTPATIENT
Start: 2019-09-09 | End: 2019-09-09

## 2019-09-09 RX ORDER — PROPOFOL 10 MG/ML
INJECTION, EMULSION INTRAVENOUS CONTINUOUS PRN
Status: DISCONTINUED | OUTPATIENT
Start: 2019-09-09 | End: 2019-09-09

## 2019-09-09 RX ORDER — ONDANSETRON 2 MG/ML
INJECTION INTRAMUSCULAR; INTRAVENOUS PRN
Status: DISCONTINUED | OUTPATIENT
Start: 2019-09-09 | End: 2019-09-09

## 2019-09-09 RX ORDER — HYDROMORPHONE HYDROCHLORIDE 1 MG/ML
.3-.5 INJECTION, SOLUTION INTRAMUSCULAR; INTRAVENOUS; SUBCUTANEOUS EVERY 5 MIN PRN
Status: DISCONTINUED | OUTPATIENT
Start: 2019-09-09 | End: 2019-09-09 | Stop reason: HOSPADM

## 2019-09-09 RX ORDER — ONDANSETRON 2 MG/ML
4 INJECTION INTRAMUSCULAR; INTRAVENOUS EVERY 6 HOURS PRN
Status: DISCONTINUED | OUTPATIENT
Start: 2019-09-09 | End: 2019-09-10 | Stop reason: HOSPADM

## 2019-09-09 RX ORDER — ALBUTEROL SULFATE 0.83 MG/ML
2.5 SOLUTION RESPIRATORY (INHALATION) EVERY 4 HOURS PRN
Status: DISCONTINUED | OUTPATIENT
Start: 2019-09-09 | End: 2019-09-09 | Stop reason: HOSPADM

## 2019-09-09 RX ADMIN — HYDROMORPHONE HYDROCHLORIDE 0.5 MG: 1 INJECTION, SOLUTION INTRAMUSCULAR; INTRAVENOUS; SUBCUTANEOUS at 16:47

## 2019-09-09 RX ADMIN — ROCURONIUM BROMIDE 10 MG: 10 INJECTION INTRAVENOUS at 16:12

## 2019-09-09 RX ADMIN — APREPITANT 40 MG: 40 CAPSULE ORAL at 10:27

## 2019-09-09 RX ADMIN — NEOSTIGMINE METHYLSULFATE 4 MG: 1 INJECTION, SOLUTION INTRAVENOUS at 17:09

## 2019-09-09 RX ADMIN — SODIUM CHLORIDE, POTASSIUM CHLORIDE, SODIUM LACTATE AND CALCIUM CHLORIDE: 600; 310; 30; 20 INJECTION, SOLUTION INTRAVENOUS at 11:45

## 2019-09-09 RX ADMIN — MECLIZINE 12.5 MG: 12.5 TABLET ORAL at 10:28

## 2019-09-09 RX ADMIN — MIDAZOLAM 2 MG: 1 INJECTION INTRAMUSCULAR; INTRAVENOUS at 11:45

## 2019-09-09 RX ADMIN — DEXAMETHASONE SODIUM PHOSPHATE 4 MG: 4 INJECTION, SOLUTION INTRA-ARTICULAR; INTRALESIONAL; INTRAMUSCULAR; INTRAVENOUS; SOFT TISSUE at 16:54

## 2019-09-09 RX ADMIN — HYDROMORPHONE HYDROCHLORIDE 0.5 MG: 1 INJECTION, SOLUTION INTRAMUSCULAR; INTRAVENOUS; SUBCUTANEOUS at 11:54

## 2019-09-09 RX ADMIN — PROPOFOL 150 MCG/KG/MIN: 10 INJECTION, EMULSION INTRAVENOUS at 11:54

## 2019-09-09 RX ADMIN — ACETAMINOPHEN 1000 MG: 500 TABLET, FILM COATED ORAL at 10:28

## 2019-09-09 RX ADMIN — PHENYLEPHRINE HYDROCHLORIDE 100 MCG: 10 INJECTION INTRAVENOUS at 12:46

## 2019-09-09 RX ADMIN — SODIUM CHLORIDE, POTASSIUM CHLORIDE, SODIUM LACTATE AND CALCIUM CHLORIDE: 600; 310; 30; 20 INJECTION, SOLUTION INTRAVENOUS at 12:00

## 2019-09-09 RX ADMIN — GLYCOPYRROLATE 0.6 MG: 0.2 INJECTION, SOLUTION INTRAMUSCULAR; INTRAVENOUS at 17:09

## 2019-09-09 RX ADMIN — CEFAZOLIN SODIUM 1 G: 2 INJECTION, SOLUTION INTRAVENOUS at 16:12

## 2019-09-09 RX ADMIN — HYDROXYZINE HYDROCHLORIDE 25 MG: 25 TABLET ORAL at 10:28

## 2019-09-09 RX ADMIN — PHENYLEPHRINE HYDROCHLORIDE 100 MCG: 10 INJECTION INTRAVENOUS at 12:54

## 2019-09-09 RX ADMIN — ROCURONIUM BROMIDE 10 MG: 10 INJECTION INTRAVENOUS at 14:45

## 2019-09-09 RX ADMIN — ROCURONIUM BROMIDE 50 MG: 10 INJECTION INTRAVENOUS at 11:54

## 2019-09-09 RX ADMIN — ONDANSETRON 4 MG: 2 INJECTION INTRAMUSCULAR; INTRAVENOUS at 17:03

## 2019-09-09 RX ADMIN — PHENYLEPHRINE HYDROCHLORIDE 100 MCG: 10 INJECTION INTRAVENOUS at 13:03

## 2019-09-09 RX ADMIN — ROCURONIUM BROMIDE 20 MG: 10 INJECTION INTRAVENOUS at 12:44

## 2019-09-09 RX ADMIN — CEFAZOLIN SODIUM 2 G: 2 INJECTION, SOLUTION INTRAVENOUS at 12:10

## 2019-09-09 RX ADMIN — SODIUM CHLORIDE, POTASSIUM CHLORIDE, SODIUM LACTATE AND CALCIUM CHLORIDE: 600; 310; 30; 20 INJECTION, SOLUTION INTRAVENOUS at 14:26

## 2019-09-09 RX ADMIN — DEXAMETHASONE SODIUM PHOSPHATE 4 MG: 4 INJECTION, SOLUTION INTRA-ARTICULAR; INTRALESIONAL; INTRAMUSCULAR; INTRAVENOUS; SOFT TISSUE at 12:10

## 2019-09-09 RX ADMIN — KETOROLAC TROMETHAMINE 30 MG: 30 INJECTION, SOLUTION INTRAMUSCULAR at 17:06

## 2019-09-09 RX ADMIN — PHENYLEPHRINE HYDROCHLORIDE 100 MCG: 10 INJECTION INTRAVENOUS at 12:31

## 2019-09-09 RX ADMIN — HYDROMORPHONE HYDROCHLORIDE 0.5 MG: 1 INJECTION, SOLUTION INTRAMUSCULAR; INTRAVENOUS; SUBCUTANEOUS at 14:47

## 2019-09-09 RX ADMIN — PHENYLEPHRINE HYDROCHLORIDE 100 MCG: 10 INJECTION INTRAVENOUS at 12:49

## 2019-09-09 RX ADMIN — LIDOCAINE HYDROCHLORIDE 100 MG: 20 INJECTION, SOLUTION INFILTRATION; PERINEURAL at 11:54

## 2019-09-09 RX ADMIN — ROCURONIUM BROMIDE 10 MG: 10 INJECTION INTRAVENOUS at 14:09

## 2019-09-09 RX ADMIN — PHENYLEPHRINE HYDROCHLORIDE 100 MCG: 10 INJECTION INTRAVENOUS at 12:37

## 2019-09-09 RX ADMIN — CEFAZOLIN SODIUM 1 G: 2 INJECTION, SOLUTION INTRAVENOUS at 14:10

## 2019-09-09 RX ADMIN — ONDANSETRON 4 MG: 2 INJECTION INTRAMUSCULAR; INTRAVENOUS at 12:10

## 2019-09-09 RX ADMIN — ROCURONIUM BROMIDE 10 MG: 10 INJECTION INTRAVENOUS at 13:31

## 2019-09-09 RX ADMIN — SODIUM CHLORIDE, POTASSIUM CHLORIDE, SODIUM LACTATE AND CALCIUM CHLORIDE: 600; 310; 30; 20 INJECTION, SOLUTION INTRAVENOUS at 15:42

## 2019-09-09 RX ADMIN — PHENYLEPHRINE HYDROCHLORIDE 0.2 MCG/KG/MIN: 10 INJECTION INTRAVENOUS at 12:52

## 2019-09-09 ASSESSMENT — COPD QUESTIONNAIRES: COPD: 0

## 2019-09-09 ASSESSMENT — MIFFLIN-ST. JEOR: SCORE: 1387.82

## 2019-09-09 ASSESSMENT — ENCOUNTER SYMPTOMS
DYSRHYTHMIAS: 0
SEIZURES: 0

## 2019-09-09 ASSESSMENT — LIFESTYLE VARIABLES: TOBACCO_USE: 0

## 2019-09-09 NOTE — BRIEF OP NOTE
[facility]    Brief Operative Note    Pre-operative diagnosis: POST MENOPAUSAL BLEEDING  Post-operative diagnosis Same, seromyotomy to sigmoid colon,  History of ulcerative colitis  Procedure: Procedure(s):  TOTAL LAPAROSCOPIC HYSTERECTOMY, BILATERAL SALPINGO OOPHORECTOMY, LYSIS OF ADHESIONS, CYSTOCOPY, RIGID PROCTO, laparoscopic repair seromyotomy  Surgeon: Surgeon(s) and Role:     * Chelle Rebollar MD - Primary     * Julia Spain MD - Assisting     * Briana Crews MD - Assisting  Anesthesia: General   Estimated blood loss: Minimal  Drains: None  Specimens:   ID Type Source Tests Collected by Time Destination   A : uterus, cervix, ovaries, fallopian tubes Tissue Other SURGICAL PATHOLOGY EXAM Chelle Rebollar MD 9/9/2019  3:06 PM      Findings:   seromyotomy found in distal sigmoid colon.  Confirmed with rigid proctoscopy.  Myotomy measured approximately 1 cm.  Closed with 4 sutures of 3-0 vicryl.  No proctitis seen.  Complications: None.  Implants:  * No implants in log *     OK for full liquids post op for 5 days.   Regular diet after than.  No activity restrictions for colorectal surgery standpoint.

## 2019-09-09 NOTE — BRIEF OP NOTE
[facility] Brief Operative Note    Pre-operative diagnosis: POST MENOPAUSAL BLEEDING, On estrogen for ulcerative colitis.    Post-operative diagnosis Same   Procedure: Procedure(s):  TOTAL LAPAROSCOPIC HYSTERECTOMY, BILATERAL SALPINGO OOPHORECTOMY, LYSIS OF ADHESIONS, CYSTOCOPY, RIGID PROCTO, laparoscopic repair seromyotomy   Surgeon(s): Surgeon(s) and Role:     * Chelle Rebollar MD - Primary     * Julia Spain MD - Assisting     * Briana Crews MD - Assisting   Estimated blood loss: 125 mL    Specimens: ID Type Source Tests Collected by Time Destination   A : uterus, cervix, ovaries, fallopian tubes Tissue Other SURGICAL PATHOLOGY EXAM Chelle Rebollar MD 9/9/2019  3:06 PM       Findings: The uterus was 8 weeks size. The pelvic bones are android, There were two adhesions of the bowel to the anterior abdominal wall. The bladder was densely adherent to the lower uterine segment.    The serosal of the sigmoid had a 2 cm rent due to the adhesions. Interoperative consult per Dr. Damon who performed a proctoscope and over sewed the serosa.    Pt to PAC stable.     Chelle Rebollar MD

## 2019-09-09 NOTE — ANESTHESIA CARE TRANSFER NOTE
Patient: John Steven    Procedure(s):  TOTAL LAPAROSCOPIC HYSTERECTOMY, BILATERAL SALPINGO OOPHORECTOMY, LYSIS OF ADHESIONS, CYSTOCOPY, RIGID PROCTO, laparoscopic repair seromyotomy    Diagnosis: POST MENOPAUSAL BLEEDING  Diagnosis Additional Information: No value filed.    Anesthesia Type:   General, ETT, RSI     Note:  Airway :Face Mask  Patient transferred to:PACU  Comments: Extubated awake in OR, exchanging well, to recovery, VSSHandoff Report: Identifed the Patient, Identified the Reponsible Provider, Reviewed the pertinent medical history, Discussed the surgical course, Reviewed Intra-OP anesthesia mangement and issues during anesthesia, Set expectations for post-procedure period and Allowed opportunity for questions and acknowledgement of understanding      Vitals: (Last set prior to Anesthesia Care Transfer)    CRNA VITALS  9/9/2019 1656 - 9/9/2019 1736      9/9/2019             Pulse:  75    SpO2:  97 %    Resp Rate (observed):  5  (Abnormal)                 Electronically Signed By: ALFREDO Dwyer CRNA  September 9, 2019  5:36 PM

## 2019-09-10 VITALS
RESPIRATION RATE: 16 BRPM | SYSTOLIC BLOOD PRESSURE: 95 MMHG | TEMPERATURE: 98.6 F | OXYGEN SATURATION: 97 % | WEIGHT: 193 LBS | HEART RATE: 89 BPM | DIASTOLIC BLOOD PRESSURE: 56 MMHG | BODY MASS INDEX: 36.44 KG/M2 | HEIGHT: 61 IN

## 2019-09-10 LAB — GLUCOSE BLDC GLUCOMTR-MCNC: 132 MG/DL (ref 70–99)

## 2019-09-10 PROCEDURE — 25000132 ZZH RX MED GY IP 250 OP 250 PS 637: Performed by: OBSTETRICS & GYNECOLOGY

## 2019-09-10 PROCEDURE — 82962 GLUCOSE BLOOD TEST: CPT

## 2019-09-10 RX ORDER — HYDROCODONE BITARTRATE AND ACETAMINOPHEN 5; 325 MG/1; MG/1
1 TABLET ORAL EVERY 6 HOURS PRN
Qty: 10 TABLET | Refills: 0 | Status: SHIPPED | OUTPATIENT
Start: 2019-09-10 | End: 2019-09-13

## 2019-09-10 RX ADMIN — HYDROCODONE BITARTRATE AND ACETAMINOPHEN 1 TABLET: 5; 325 TABLET ORAL at 03:49

## 2019-09-10 ASSESSMENT — ACTIVITIES OF DAILY LIVING (ADL)
FALL_HISTORY_WITHIN_LAST_SIX_MONTHS: NO
TRANSFERRING: 0-->INDEPENDENT
RETIRED_COMMUNICATION: 0-->UNDERSTANDS/COMMUNICATES WITHOUT DIFFICULTY
DRESS: 0-->INDEPENDENT
RETIRED_EATING: 0-->INDEPENDENT
COGNITION: 0 - NO COGNITION ISSUES REPORTED
SWALLOWING: 0-->SWALLOWS FOODS/LIQUIDS WITHOUT DIFFICULTY
BATHING: 0-->INDEPENDENT
AMBULATION: 0-->INDEPENDENT
TOILETING: 0-->INDEPENDENT

## 2019-09-10 NOTE — PLAN OF CARE
A&0x4. VSS on Ra. Pt  Independent. Tolerating full liquid diet. R lap site CHLOÉ; R lap site small mount of drainage, dressing changed; umbilical site covered with bandages, CDI. Pain controlled with Norco. Cleared to discharge per colorectal and OBGYN. Discharge teaching complete, questions answered, medications reviewed. Pt discharged home with .

## 2019-09-10 NOTE — PLAN OF CARE
Pt A&Ox4, VSS on RA, refused capno. Up SBA in room and wakefield. Denies pain. Stopped and removed IV on L hand, d/t infiltration and significant edema. Pt voiding adequately. R & Center lap sites CDI, L site was leaking serosanguinous fluid, reinforced with 4x4 gauze. LS clear, BS hypo. CMS intact. Discharge orders already in place for tomorrow.

## 2019-09-10 NOTE — PROGRESS NOTES
[facility]ON [facility]CT[facility]  PROGRESS NOTE    September 10, 2019  Post-op Day # 1 s/p laparoscopic repair of sigmoid colon seromyotomy, rigid procto    SUBJECTIVE:  Pt sitting up in bed having breakfast. Pain well controlled, tolerating full liquid diet, no nausea. Discussed operative findings per Dr Little with patient. Discussed patient's ulcerative colitis history. Diagnosed at age 29, currently well controlled on remicade q 8 weeks. [facility] following patient. Last colonoscopy was a few years ago, patient states she is due for another.     OBJECTIVE:  Temp:  [97  F (36.1  C)-98.6  F (37  C)] 98.6  F (37  C)  Pulse:  [71-89] 89  Heart Rate:  [67-87] 75  Resp:  [12-28] 16  BP: ()/() 95/56  SpO2:  [96 %-100 %] 97 %    Intake/Output Summary (Last 24 hours) at 9/10/2019 0900  Last data filed at 9/10/2019 0653  Gross per 24 hour   Intake 3006 ml   Output 2500 ml   Net 506 ml       GENERAL:  Awake, alert, no acute distress  HEAD: Normocephalic atraumatic  SCLERA: Anicteric  EXTREMITIES: Warm and well perfused  ABDOMEN:  Soft, non-tender, non-distended. No guarding, rigidity, or peritoneal signs.   INCISION:  C/d/i, bandages dry    LABS:  Lab Results   Component Value Date    WBC 10.9 06/16/2017     Lab Results   Component Value Date    HGB 15.6 05/08/2019     Lab Results   Component Value Date    HCT 35.8 06/16/2017     Lab Results   Component Value Date     06/16/2017     Last Basic Metabolic Panel:  Lab Results   Component Value Date     06/16/2017      Lab Results   Component Value Date    POTASSIUM 3.9 05/08/2019     Lab Results   Component Value Date    CHLORIDE 107 06/16/2017     Lab Results   Component Value Date    YULISA 7.8 06/16/2017     Lab Results   Component Value Date    CO2 19 06/16/2017     Lab Results   Component Value Date    BUN 10 06/16/2017     Lab Results   Component Value Date    CR 0.83 05/08/2019     Lab Results   Component Value Date     06/16/2017       ASSESSMENT/PLAN:  Nursing Note by Shalini Virk NCMA at 01/14/17 08:16 AM     Author:  Shalini Virk NCMA Service:  (none) Author Type:  Certified Medical Assistant     Filed:  01/14/17 08:16 AM Encounter Date:  1/14/2017 Status:  Signed     :  Shalini Virk NCMA (Registered Nurse)            Patient brought to exam room.  Electronically Signed by:    MINE Hernandez , 1/14/2017  Hipolito Acuña was offered treatment for pain control:[LD1.1T] Yes.  Patient refused comfort measures to reduce pain.[LD1.1M]    Last visit with YENIFER GUTIERREZ was on No match found  [facility] was on 10/07/2016 at  2:40 PM [facility]  Match done based on reference date of today 1/14/17[LD1.1T]            Revision History        User Key Date/Time User Provider Type Action    > LD1.1 01/14/17 08:16 AM Shalini Virk NCMA Registered Nurse Sign    M - Manual, T - Template             58 yo female with PMHx post-menopausal bleeding and ulcerative colitis. POD#1 s/p lap hysterectomy, BSO, cystocsopy, repair of sigmoid colon seromyotomy, and rigid procto. AVSS, pain controlled. Recommend patient continue full liquid diet x 5 days post-op. Miralax daily. OK for remicade infusion on Sept. 18.    1. Full liquid diet x 5 days. Miralax daily for hard stools. Goal is 1-2 soft, formed bowel movements per day.   2. PRN pain medication per primary  3. IVF per primary  4. Follow-up with [facility] as needed, phone number provided to patient.   5. Recommend follow-up with [facility] regarding colonoscopy   6. This plan was discussed with Dr Little.     For questions/paging, please contact the [facility] office at [phone].    Raiza Hebert PA-C  [facility]  Phone: [phone]

## 2019-09-10 NOTE — DISCHARGE INSTRUCTIONS
Continue full liquid diet x 5 days post-op. Miralax daily. Goal is 1-2 soft, formed bowel movements per day. OK for remicade infusion on Sept. 18.

## 2019-09-10 NOTE — ANESTHESIA POSTPROCEDURE EVALUATION
Patient: John Steven    Procedure(s):  TOTAL LAPAROSCOPIC HYSTERECTOMY, BILATERAL SALPINGO OOPHORECTOMY, LYSIS OF ADHESIONS, CYSTOCOPY, RIGID PROCTO, laparoscopic repair seromyotomy    Diagnosis:POST MENOPAUSAL BLEEDING  Diagnosis Additional Information: No value filed.    Anesthesia Type:  General, ETT, RSI    Note:  Anesthesia Post Evaluation    Patient location during evaluation: PACU  Patient participation: Able to fully participate in evaluation  Level of consciousness: awake  Pain management: adequate  Airway patency: patent  Cardiovascular status: acceptable  Respiratory status: acceptable  Hydration status: acceptable  PONV: none     Anesthetic complications: None          Last vitals:  Vitals:    09/09/19 1830 09/09/19 1900 09/09/19 2000   BP: 105/63 110/63 128/75   Pulse: 75 74 71   Resp: 17 12 14   Temp:  36.5  C (97.7  F) 36.7  C (98  F)   SpO2: 99% 99% 98%         Electronically Signed By: Tabitha Garcia  September 9, 2019  8:44 PM

## 2019-09-10 NOTE — PLAN OF CARE
A&Ox4.  AVSS, on RA.  Denies surgical pain but having discomfort from her esophageal spasm, and keeping her awake, Norco x 1 given with some relief.  Clears ADAT only to full liquid.  LS clear. IS encouraged.  BS hypoactive, not passing gas.  Voiding w/o difficulty with good amount.  Lap sites x 3, umbilical site with band-aid, gauze placed over LLQ lap site from previous shift, dried drainage, RLQ lap site with liquid bandage CHLOÉ.  Ambulates in the wakefield independently.  Slept some after having Norco.

## 2019-09-10 NOTE — PROGRESS NOTES
[facility] Post-Op Progress Note/POD # 1          Interval History:   Stable.  Doing well.   Pain is reasonably controlled.  No fevers. Pt dealing with achalasia symptoms. Tolerating clear liquids, advance to full liquids.           Significant Problems:      Patient Active Problem List   Diagnosis     Other specified disorder of rectum and anus     Ulcerative colitis (H)     Other urethritis(597.89)     Other and unspecified hormones and synthetic substitutes causing adverse effect in therapeutic use     Allergic rhinitis     Disorder of esophagus     Rosacea     Dizziness and giddiness     Obesity     Immunocompromised (H)     Post-operative state             Review of Systems:    The patient denies any chest pain, shortness of breath, excessive pain, fever, chills, purulent drainage from the wound, nausea or vomiting.          Medications:   All medications related to the patient's surgery have been reviewed          Physical Exam:    B/P: 95/56, T: 98.6, P: 89, R: 16    All vitals stable  Abdomen soft, no rebound, no guarding  Wound clean and dry with minimal or no drainage.  Surrounding skin with minimal erythema.  Lower extremities: nontender calves, no edema bilaterally          Data:   All laboratory data related to this surgery reviewed  Lab Results   Component Value Date    HGB 15.6 05/08/2019    HGB 12.1 06/16/2017    HGB 13.1 04/15/2016     Lab Results   Component Value Date    WBC 10.9 06/16/2017    HGB 15.6 05/08/2019    HCT 35.8 06/16/2017     06/16/2017    CHOL 170 08/10/2018    TRIG 103 08/10/2018    HDL 51 08/10/2018    ALT 16 04/15/2016    AST 16 04/15/2016     06/16/2017    BUN 10 06/16/2017    CO2 19 (L) 06/16/2017    TSH 1.26 08/21/2015     Results for orders placed or performed during the hospital encounter of 09/09/19 (from the past 24 hour(s))   Glucose by meter   Result Value Ref Range    Glucose 132 (H) 70 - 99 mg/dL     No results found for this or any previous  visit (from the past 48 hour(s)).       Assessment and Plan:    Assessment: POD # 1     Post-operative day #1  Total laparoscopic hysterectomy, bilateral salpingophorectomy, lysis of adhesions, cystectomy. Serosal repair of the colon per Dr. Clark.   Doing well.  Clean wound without signs of infection.  No immediate surgical complications identified.        Plan:   Ambulate  Full liquids x 5 days  Discharge home.  Norco for pain.   F/u in 2 weeks        September 10, 2019  8:18 AM  Chelle Rebollar MD

## 2019-09-10 NOTE — OP NOTE
Procedure Date: 09/09/2019      PREOPERATIVE DIAGNOSIS:  Postmenopausal bleeding.      POSTOPERATIVE DIAGNOSES:   1.  Postmenopausal bleeding.   2.  Seromyotomy to sigmoid colon.   3.  History of ulcerative colitis.      PROCEDURE PERFORMED:     1.  Laparoscopic repair, sigmoid colon seromyotomy.   2.  Rigid proctoscopy.      STAFF SURGEON:  Leandro Little MD.      CO-SURGEON:  Chelle Rebollar MD      ANESTHESIA:  General.      ESTIMATED BLOOD LOSS:  Minimal.      SPECIMENS:  None for my portion.      FINDINGS:  There was a 1 cm seromyotomy found in the antimesenteric portion of the distal sigmoid colon.  This was seen on regular laparoscopy, confirmed with rigid proctoscopy.  No blood, colitis, proctitis, or evidence of a full thickness injury was seen on rigid proctoscopy.  The proctoscopy was limited by the presence of solid stool.  The seromyotomy was repaired with 4 sutures of 3-0 Vicryl placed in interrupted fashion.      INDICATIONS:  John is a 59-year-old female who is undergoing a laparoscopic total abdominal hysterectomy, bilateral salpingo-oophorectomy with Dr. Chelle Rebollar today.  Please see Dr. Rebollar's note regarding the indications for surgery.  I was asked to assist in the operating room when, during the course of the procedure, a seromyotomy was encountered after laparoscopically lysing adhesions of the bowel to the anterior abdominal wall.  Dr. Rebollar reports that the patient has a history of ulcerative colitis.  Review of the chart does not demonstrate any recent history of use of biologic therapy or steroids.      DESCRIPTION OF PROCEDURE:  As I entered the operating room, the patient was under general anesthesia in the lithotomy position, in slight Trendelenburg position.  Laparoscopic ports were placed with a midline 11 mm port and 2 lower abdominal 5 mm ports.  Dr. Rebollar demonstrated the areas of concern.  It appeared that there was a possible seromyotomy to the antimesenteric portion of  the distal sigmoid colon as well as an opening of the peritoneum along the mesentery of the upper rectum.  I then scrubbed into the perineal field and performed a rigid proctoscopy.  There were no palpable masses.  A rigid proctoscope was inserted in the anus and advanced to approximately 15 cm.  There was good distention of the bowel.  There was no proctitis or blood noted within the lumen.  The bowel held the insufflation well.  We could see with concurrent laparoscopic visualization the area of concern at the antimesenteric portion of the distal sigmoid colon.  There was a 1 inch area where there was a seromyotomy present.  There was a small amount of blood here.  The air was then evacuated.  I then scrubbed into the abdominal field.  Utilizing a [place] grasper and a laparoscopic needle , I then oversewed the area in a longitudinal fashion with interrupted 3-0 Vicryl sutures.  Four sutures were placed.  Hemostasis was excellent at the end of the case.  I then performed a repeat rigid proctoscopy while Dr. Rebollar simultaneously performed laparoscopy.  Again, there was no blood noted within the lumen and the area of concern was easily insufflated without any evidence of dehiscence or undue tension on the area.  The lumen was widely patent.      I then evacuated the insufflation gas.  Case was turned back over to Dr. Rebollar.  Please see her note for further details.         MARLON LITTLE MD             D: 2019   T: 2019   MT: CINDY      Name:     DELIA VALERO   MRN:      [id number]        Account:        [id number]   :      1960           Procedure Date: 2019      Document: [id number]       cc: Rianna Little MD

## 2019-09-11 LAB — COPATH REPORT: NORMAL

## 2019-09-15 NOTE — OP NOTE
Procedure Date: 09/09/2019      REVISED REPORT      PREOPERATIVE DIAGNOSIS:  Postmenopausal bleeding.  She requires estrogen for ulcerative colitis.      POSTOPERATIVE DIAGNOSIS:  Postmenopausal bleeding, she requires estrogen for ulcerative colitis procedure.      PROCEDURE:  Total laparoscopic hysterectomy, bilateral salpingo-oophorectomy, lysis of adhesions, and cystoscopy.  A rigid procto and laparoscopic repair of serum myotomy per Dr. Leandro Little.      SURGEON:  Chelle Rebollar MD, Briana Crews MD, Julia Spain MD, and Leandro Little MD.      ESTIMATED BLOOD LOSS:  125 mL.      SPECIMENS:  Uterus, cervix, tubes and ovaries.      FINDINGS:  Uterus was 8 weeks' size.  The patient's pelvic structure was android.  There were 2 adhesions of the bowel to the anterior abdominal wall.  The bladder was densely adherent to the lower uterine segment.  After the takedown of the adhesions of the bowel to the anterior abdominal wall, there appeared to be an approximately 2 cm rent to the sigmoid, and Dr. Little dictated on repair of this.      DESCRIPTION OF PROCEDURE:  After obtaining informed consent, the patient was brought to the operative suite where she was placed in the supine position and general anesthesia was administered.  She was then placed in the dorsal lithotomy position.  Uterus was 8 weeks size, adnexa nonpalpable.  She was then prepped and draped in the usual fashion.  After a timeout, the speculum was placed.  The anterior lip of the cervix was grasped and the medium VCare was placed, the bulb was filled and the VCare was locked into place.  We then removed the speculum and the tenaculum had been removed.  The Fleming catheter was placed.  Outer gloves were removed.  Infraumbilical incision was made and Marcaine infiltrated.  The incision in the periumbilical area was made with a 15 blade.  The incision was opened further with a [place] and then a Veress needle was placed.  There was good flow of water  and gas.  Then filled the abdomen with approximately 3 liters of gas.  The needle was removed and then under direct visualization, the 10/11 trocar was placed.  The patient was placed in Trendelenburg.      We could see the uterus was approximately 8-week size, adnexa appeared normal bilaterally as did the tubes.  There were 2 adhesions of bowel to the anterior abdominal wall in the right lower quadrant and left lower quadrant, and the trocar was placed high as the patient was short waisted on the right and to avoid the adhesion.  Area was infiltrated with Marcaine.  The incision was made with a scalpel and under direct visualization, the 5 mm trocar was placed.  This was repeated on the left side.  We then used the Gyrus to take down the adhesion to the anterior abdominal wall.  The patient had been placed in Trendelenburg.  The ureters were identified bilaterally and then used the Gyrus to take down the infundibulopelvic ligament.  This was triple cauterized and cut.  The round ligament was then dissected, this again was triple cauterized and then cut.  We then continued down the broad ligament to the level the uterine artery.  This was cauterized.  We went over to the left side of the patient again repeated the same process.      At that point, we opened the bladder flap, it was noted that the bladder flap was very dense and careful dissection of this was performed.  The bladder was filled with methylene blue to identify that the bladder was intact and to help with the dissection.  After the bladder was taken down, we then emptied the bladder and continued with the uterine arteries along the cervix.  Note that the Thunderbeat had been asked for at the beginning of this case and was initially told that was not available.  We then attempted to use the Gyrus halo to open the cervix.  This did not work, so the operative crew, when asked what could be used that was available to enter the cervix, they brought in a  LigaSure and once that was opened it was realized that it did not actually have the cutting capability that the Thunderbeat did.  Then someone was able to find a Thunderbeat, so that was used to remove the cervix from the vagina.  At that point, Dr. Spain came in for Dr. Crews as this was a very difficult case.  The patient was extremely short waisted which made it difficult to manipulate with this large uterus.  Then removed the remainder of the cervix from the uterus and the uterus and cervix were removed through the vagina.  The glove with some 4x4s were placed in the vagina.  The vagina was grasped and the vagina was closed with the 0 V-Loc.  At that point, the bowel was inspected and it appeared that there was possibly a small rent in the serosa of the bowel so an intraoperative consult was performed.  The surgeon came in and was unsure and suggested that we have Colorectal come.      At that point, Dr. Leandro Little was called and was on his way.  The pelvis was irrigated and suctioned.  It was hemostatic.  When Dr. Little arrived, he felt that the serosa needed repair.  See his report for the procto and the close of the serosa.  At the end, the pelvis was irrigated and suctioned and it was hemostatic.  The accessory trocars were removed under direct visualization and were hemostatic.  The main trocar was used to remove the gas and then it was removed.  The fascia was identified and grasped with a Kocher and it was closed with 0 Vicryl.  The umbilical incision was closed with 4-0 Monocryl.  The remainder of the incisions were closed with Dermabond.  The Fleming catheter had been replaced to empty the bladder for the procedure for inspection of the cuff and to close the cuff.  This was then removed and the glove with the 4x4s were also removed.  Instrument, sponge and needle counts were correct and the patient went to the recovery room in stable condition.      ADDENDUM:   The bladder was intact and we could  see the ureters bilaterally effluxing urine.      Addendum  #[id number]  09/15/2019  kadie               ALY BOWIE MD             D: 09/15/2019   T: 09/15/2019   MT: MADISON      Name:     DELIA VALERO   MRN:      [id number]        Account:        [id number]   :      1960           Procedure Date: 2019      Document: [id number]

## 2019-09-15 NOTE — DISCHARGE SUMMARY
Admit Date:     2019   Discharge Date:     09/10/2019      REASON FOR ADMISSION:  Delia Steven was admitted on 2019 for a total laparoscopic hysterectomy, bilateral salpingo-oophorectomy and cystoscopy.        The patient also underwent a procto per . Leandro was born and repair of a serosal area that had been adhesed to anterior abdominal wall.        HOSPITAL COURSE:  Postoperatively, the patient did well.  She was taking fluids and was not nauseated.  She had no fevers and her pain tolerance was doing well.  On postoperative day #1, she was to only have full liquids for 5 days.  She was given Norco to take home for pain.  She is to follow up in the office in 2 weeks.         ALY BOWIE MD             D: 09/15/2019   T: 09/15/2019   MT: MADISON      Name:     DELIA STEVEN   MRN:      [id number]        Account:        [id number]   :      1960           Admit Date:     2019                                  Discharge Date: 09/10/2019      Document: [id number]       cc: Rianna Sim MD

## 2019-09-18 ENCOUNTER — TRANSFERRED RECORDS (OUTPATIENT)
Dept: HEALTH INFORMATION MANAGEMENT | Facility: CLINIC | Age: 59
End: 2019-09-18

## 2019-09-18 ENCOUNTER — MEDICAL CORRESPONDENCE (OUTPATIENT)
Dept: HEALTH INFORMATION MANAGEMENT | Facility: CLINIC | Age: 59
End: 2019-09-18

## 2019-10-01 ENCOUNTER — HEALTH MAINTENANCE LETTER (OUTPATIENT)
Age: 59
End: 2019-10-01

## 2019-11-13 ENCOUNTER — TRANSFERRED RECORDS (OUTPATIENT)
Dept: HEALTH INFORMATION MANAGEMENT | Facility: CLINIC | Age: 59
End: 2019-11-13

## 2019-11-15 ENCOUNTER — TRANSFERRED RECORDS (OUTPATIENT)
Dept: HEALTH INFORMATION MANAGEMENT | Facility: CLINIC | Age: 59
End: 2019-11-15

## 2019-11-15 DIAGNOSIS — F41.8 SITUATIONAL ANXIETY: ICD-10-CM

## 2019-11-15 NOTE — TELEPHONE ENCOUNTER
"Last Written Prescription Date:  3/19/19  Last Fill Quantity: 60,  # refills: 3   Last office visit: 8/9/2019 with prescribing provider:     Future Office Visit:    Requested Prescriptions   Pending Prescriptions Disp Refills     desvenlafaxine succinate (PRISTIQ) 25 MG 24 hr tablet [Pharmacy Med Name: DESVENLAFAXINE ER SUCCINATE 25MG T] 60 tablet 0     Sig: TAKE 1 TABLET BY MOUTH EVERY DAY       Serotonin-Norepinephrine Reuptake Inhibitors  Passed - 11/15/2019  1:10 PM        Passed - Blood pressure under 140/90 in past 12 months     BP Readings from Last 3 Encounters:   09/10/19 95/56   08/09/19 104/69   05/13/19 113/68                 Passed - Recent (12 mo) or future (30 days) visit within the authorizing provider's specialty     Patient has had an office visit with the authorizing provider or a provider within the authorizing providers department within the previous 12 mos or has a future within next 30 days. See \"Patient Info\" tab in inbasket, or \"Choose Columns\" in Meds & Orders section of the refill encounter.              Passed - Medication is active on med list        Passed - Patient is age 18 or older        Passed - No active pregnancy on record        Passed - Normal serum creatinine on file in past 12 months     Recent Labs   Lab Test 05/08/19  0936   CR 0.83             Passed - No positive pregnancy test in past 12 months          "

## 2019-11-19 RX ORDER — DESVENLAFAXINE 25 MG/1
TABLET, EXTENDED RELEASE ORAL
Start: 2019-11-19

## 2019-12-04 DIAGNOSIS — F41.8 SITUATIONAL ANXIETY: ICD-10-CM

## 2019-12-04 RX ORDER — DESVENLAFAXINE 25 MG/1
TABLET, EXTENDED RELEASE ORAL
Qty: 90 TABLET | Refills: 2 | Status: SHIPPED | OUTPATIENT
Start: 2019-12-04 | End: 2020-03-30

## 2019-12-04 NOTE — TELEPHONE ENCOUNTER
"Last Written Prescription Date:  3/19/19  Last Fill Quantity: 60,  # refills: 3   Last office visit: 8/9/2019 with prescribing provider:     Future Office Visit:    Requested Prescriptions   Pending Prescriptions Disp Refills     desvenlafaxine succinate (PRISTIQ) 25 MG 24 hr tablet [Pharmacy Med Name: DESVENLAFAXINE ER SUCCINATE 25MG T] 60 tablet 0     Sig: TAKE 1 TABLET BY MOUTH EVERY DAY       Serotonin-Norepinephrine Reuptake Inhibitors  Passed - 12/4/2019  9:47 AM        Passed - Blood pressure under 140/90 in past 12 months     BP Readings from Last 3 Encounters:   09/10/19 95/56   08/09/19 104/69   05/13/19 113/68                 Passed - Recent (12 mo) or future (30 days) visit within the authorizing provider's specialty     Patient has had an office visit with the authorizing provider or a provider within the authorizing providers department within the previous 12 mos or has a future within next 30 days. See \"Patient Info\" tab in inbasket, or \"Choose Columns\" in Meds & Orders section of the refill encounter.              Passed - Medication is active on med list        Passed - Patient is age 18 or older        Passed - No active pregnancy on record        Passed - Normal serum creatinine on file in past 12 months     Recent Labs   Lab Test 05/08/19  0936   CR 0.83             Passed - No positive pregnancy test in past 12 months          "

## 2019-12-06 ENCOUNTER — ANCILLARY PROCEDURE (OUTPATIENT)
Dept: GENERAL RADIOLOGY | Facility: CLINIC | Age: 59
End: 2019-12-06
Attending: NURSE PRACTITIONER
Payer: COMMERCIAL

## 2019-12-06 ENCOUNTER — OFFICE VISIT (OUTPATIENT)
Dept: FAMILY MEDICINE | Facility: CLINIC | Age: 59
End: 2019-12-06
Payer: COMMERCIAL

## 2019-12-06 VITALS
OXYGEN SATURATION: 98 % | BODY MASS INDEX: 36.47 KG/M2 | DIASTOLIC BLOOD PRESSURE: 68 MMHG | HEART RATE: 84 BPM | TEMPERATURE: 97.6 F | SYSTOLIC BLOOD PRESSURE: 111 MMHG | HEIGHT: 61 IN

## 2019-12-06 DIAGNOSIS — J40 BRONCHITIS: ICD-10-CM

## 2019-12-06 DIAGNOSIS — J40 BRONCHITIS: Primary | ICD-10-CM

## 2019-12-06 PROCEDURE — 99213 OFFICE O/P EST LOW 20 MIN: CPT | Performed by: NURSE PRACTITIONER

## 2019-12-06 PROCEDURE — 71046 X-RAY EXAM CHEST 2 VIEWS: CPT

## 2019-12-06 RX ORDER — FLUTICASONE PROPIONATE 220 UG/1
2 AEROSOL, METERED RESPIRATORY (INHALATION) 2 TIMES DAILY
Qty: 1 INHALER | Refills: 0 | Status: SHIPPED | OUTPATIENT
Start: 2019-12-06 | End: 2020-01-03

## 2019-12-06 NOTE — PROGRESS NOTES
"Subjective     John Steven is a 59 year old female who presents to clinic today for the following health issues:    HPI   RESPIRATORY SYMPTOMS      Duration: since September has had some increasing chronic respiratory symptoms;  Has been worse the past 3 days with copious phlegm production and lung irritation    Description  nasal congestion, cough, wheezing at night intermittently, fatigue/malaise  Severity: moderate    Accompanying signs and symptoms: SOB    History (predisposing factors):  asthma and exposed to  who was seen by a provider and was told he has a bacterial or viral infection.    Precipitating or alleviating factors: None    Therapies tried and outcome:  lalito Lopez has had pulmonary problems for some time \"always had a lot of phlegm\" but worsening over past years and exacerbated by having had 5 hours of anesthesia in September    Patient Active Problem List   Diagnosis     Other specified disorder of rectum and anus     Ulcerative colitis (H)     Other urethritis(877.06)     Other and unspecified hormones and synthetic substitutes causing adverse effect in therapeutic use     Allergic rhinitis     Disorder of esophagus     Rosacea     Dizziness and giddiness     Obesity     Immunocompromised (H)     Post-operative state     Past Surgical History:   Procedure Laterality Date     BIOPSY      BREAST     C NONSPECIFIC PROCEDURE  2003    Heller myotomy     C NONSPECIFIC PROCEDURE      EB for spotting,fibroid     C NONSPECIFIC PROCEDURE  2007    rt breast biopsy     DILATION AND CURETTAGE, HYSTEROSCOPY DIAGNOSTIC, COMBINED N/A 8/24/2015    Procedure: COMBINED DILATION AND CURETTAGE, HYSTEROSCOPY DIAGNOSTIC;  Surgeon: Chelle Rebollar MD;  Location: [facility]     DILATION AND CURETTAGE, OPERATIVE HYSTEROSCOPY WITH MORCELLATOR, COMBINED N/A 5/13/2019    Procedure: HYSTEROSCOPY, WITH DILATION AND CURETTAGE OF UTERUS USING MORCELLATOR (MYOSURE);  Surgeon: Chelle Rebollar MD;  Location: [facility]" OR     GI SURGERY       GYN SURGERY       x3     HC COLONOSCOPY THRU STOMA, DIAGNOSTIC       LAPAROSCOPIC HYSTERECTOMY TOTAL, BILATERAL SALPINGO-OOPHORECTOMY, COMBINED N/A 2019    Procedure: TOTAL LAPAROSCOPIC HYSTERECTOMY, BILATERAL SALPINGO OOPHORECTOMY, LYSIS OF ADHESIONS, CYSTOCOPY, RIGID PROCTO, laparoscopic repair seromyotomy;  Surgeon: Chelle Rebollar MD;  Location:  OR       Social History     Tobacco Use     Smoking status: Former Smoker     Packs/day: 1.00     Years: 10.00     Pack years: 10.00     Types: Cigarettes     Last attempt to quit: 1989     Years since quittin.9     Smokeless tobacco: Never Used   Substance Use Topics     Alcohol use: Yes     Alcohol/week: 0.0 standard drinks     Comment: 2-3 drinks  times a week     Family History   Problem Relation Age of Onset     Cerebrovascular Disease Mother         80     Diabetes Mother      Breast Cancer Mother         60     Blood Disease Mother         merkel cell     Gastrointestinal Disease Brother         Collagenous colitis     Asthma Son      Diabetes Maternal Grandmother          Current Outpatient Medications   Medication Sig Dispense Refill     acyclovir (ZOVIRAX) 5 % cream Apply topically 5 times daily Apply to cold sores/fever blisters (Patient taking differently: Apply topically 5 times daily Apply to cold sores/fever blisters as needed) 5 g 3     albuterol (PROAIR HFA, PROVENTIL HFA, VENTOLIN HFA) 108 (90 BASE) MCG/ACT inhaler Inhale 2 puffs into the lungs every 6 hours as needed for shortness of breath / dyspnea or wheezing       ALPRAZolam (XANAX) 0.25 MG tablet TAKE TWO TABLETS BY MOUTH THREE TIMES DAILY AS NEEDED FOR ANXIETY. 45 tablet 0     clidinium-chlordiazePOXIDE (LIBRAX) 5-2.5 MG CAPS per capsule TAKE ONE CAPSULE BY MOUTH DAILY AS NEEDED 30 capsule 12     cyclobenzaprine (FLEXERIL) 10 MG tablet TAKE 1/2 TO 1 TABLET(5 TO 10 MG) BY MOUTH EVERY 8 HOURS AS NEEDED FOR MUSCLE SPASMS 30 tablet 3      desvenlafaxine succinate (PRISTIQ) 25 MG 24 hr tablet TAKE 1 TABLET BY MOUTH EVERY DAY 90 tablet 2     diphenhydrAMINE (BENADRYL) 25 MG capsule Take 25-50 mg by mouth daily Post nasal drip causing esophageal spasms       estradiol (ESTRACE) 2 MG tablet Take 2 mg by mouth daily       fluticasone (FLOVENT HFA) 220 MCG/ACT inhaler Inhale 2 puffs into the lungs 2 times daily 1 Inhaler 0     folic acid (FOLVITE) 1 MG tablet TAKE 1 TABLET BY MOUTH EVERY DAY. 90 tablet 3     levocetirizine (XYZAL) 5 MG tablet Take 5 mg by mouth every evening       LEVSIN/SL 0.125 MG SL SUBL 1 TAB 4 TIMES DAILY, BEFORE MEALS AND AT BEDTIME 30 12     Magnesium 500 MG CAPS Take 500 mg by mouth daily       methotrexate 2.5 MG tablet CHEMO Take 12.5 mg by mouth every 7 days (5 x 2.5 mg = 12.5 mg dose)       metroNIDAZOLE (METROGEL) 1 % gel Apply  topically 2 times daily. 30 g 12     NIFEdipine ER OSMOTIC (NIFEDICAL XL) 60 MG 24 hr tablet Take 60 mg by mouth daily       Nutritional Supplements (NUTRITIONAL SUPPLEMENT PO) Take 2 capsules by mouth daily GABINO HYDRATE SUPPLEMENT 600 mg       order for DME Equipment being ordered: DME Compression stockings 2 pairs, knee high 20-30 mm pressure and Thigh high 2 pairs 20-30 mm pressure. 4 each 1     order for DME Equipment being ordered: Medium compression stockings   20-30 mm 2 each 1     order for DME Equipment being ordered: Compression stockings  20-30 mm pressure 2 each 3     order for DME Equipment being ordered:  Compression pump  Flexitouch 1 each 0     PROAIR RESPICLICK 108 (90 Base) MCG/ACT inhaler INHALE 2 PUFFS INTO THE LUNGS EVERY 6 HOURS AS NEEDED FOR SHORTNESS OF BREATH OR DIFFICULT BREATHING OR WHEEZING 1 each 11     Probiotic Product (PROBIOTIC DAILY PO) Take 1 capsule by mouth daily 69 mg/ 15 Billion CFU       psyllium 0.52 G capsule Take 1 capsule by mouth daily (Fiber)       REMICADE 100 MG injection Inject into the vein every 2 months        Vitamin D, Cholecalciferol, 1000 units  "TABS Take 1,000 Units by mouth daily       Allergies   Allergen Reactions     Alcohol Other (See Comments)     flushing     Fentanyl Nausea and Vomiting     Food      Corn, mushrooms, all peppers     Morphine Nausea and Vomiting     vomiting     Penicillins Hives     Shellfish Allergy Hives     Hives over entire body       Reviewed and updated as needed this visit by Provider         Review of Systems   ROS COMP: Constitutional, HEENT, cardiovascular, pulmonary, gi and gu systems are negative, except as otherwise noted.      Objective      /68 (BP Location: Right arm, Patient Position: Sitting, Cuff Size: Adult Large)   Pulse 84   Temp 97.6  F (36.4  C) (Tympanic)   Ht 1.549 m (5' 1\")   SpO2 98%   BMI 36.47 kg/m      Physical Exam   GENERAL: obese, alert and mild to moderate distress, afebrile  EYES: Eyes grossly normal to inspection, PERRL and conjunctivae and sclerae normal  HENT: ear canals and TM's normal, nose and mouth without ulcers or lesions  NECK: no adenopathy, no asymmetry, masses, or scars and thyroid normal to palpation  RESP: lungs clear to auscultation - no rales, rhonchi or wheezes  CV: regular rate and rhythm, normal S1 S2, no S3 or S4, no murmur, click or rub, no peripheral edema and peripheral pulses strong  ABDOMEN: soft, nontender, no hepatosplenomegaly, no masses and bowel sounds normal  MS: no gross musculoskeletal defects noted, no edema    Diagnostic Test Results:  Chest xray :                                                                  IMPRESSION: There are no acute infiltrates. The cardiac silhouette is  not enlarged. Pulmonary vasculature is unremarkable.           Assessment & Plan       ICD-10-CM    1. Bronchitis J40 XR Chest 2 Views     fluticasone (FLOVENT HFA) 220 MCG/ACT inhaler   she as an appt with [facility] with Dr Avina for additional evaluation of  Chronic pulmonary concerns    Patient Instructions   Begin using the proair inhaler 2 puffs every 6 " hours   Use the steroid inhaler 2 puffs twice a day   Rinse your mouth after use  If her symptoms worsen she will call me so that I can order an antibiotic     ALFREDO Mckeon CNP  [facility]

## 2019-12-06 NOTE — PATIENT INSTRUCTIONS
Begin using the proair inhaler 2 puffs every 6 hours   Use the steroid inhaler 2 puffs twice a day   Rinse your mouth after use

## 2019-12-06 NOTE — LETTER
[address] [facility]  [address]  [place]  [address]  Tel: [phone]    December 9, 2019    John Steven  [address] [facility] [address]        Dear Ms. Steven,    The radiologist did not find anything abnormal in the appearance of your chest xray, John  If you have any further questions or problems, please contact our office.      Sincerely,    Ariela England, ELTON/EUSEBIOL

## 2019-12-13 ENCOUNTER — TRANSFERRED RECORDS (OUTPATIENT)
Dept: HEALTH INFORMATION MANAGEMENT | Facility: CLINIC | Age: 59
End: 2019-12-13

## 2019-12-27 ENCOUNTER — HOSPITAL ENCOUNTER (OUTPATIENT)
Dept: CT IMAGING | Facility: CLINIC | Age: 59
Discharge: HOME | End: 2019-12-27
Attending: INTERNAL MEDICINE | Admitting: INTERNAL MEDICINE
Payer: COMMERCIAL

## 2019-12-27 DIAGNOSIS — K21.9 GASTRO-ESOPHAGEAL REFLUX DISEASE WITHOUT ESOPHAGITIS: ICD-10-CM

## 2019-12-27 DIAGNOSIS — R05.9 COUGH: ICD-10-CM

## 2019-12-27 DIAGNOSIS — R06.2 WHEEZING: ICD-10-CM

## 2019-12-27 PROCEDURE — 71250 CT THORAX DX C-: CPT

## 2020-01-02 DIAGNOSIS — J40 BRONCHITIS: ICD-10-CM

## 2020-01-02 NOTE — TELEPHONE ENCOUNTER
"Last Written Prescription Date:  12/06/19  Last Fill Quantity: 1 inhaler,  # refills: 0   Last office visit: 12/6/2019 with prescribing provider:  Samanta   Future Office Visit:      No flowsheet data found.    Requested Prescriptions   Pending Prescriptions Disp Refills     FLOVENT  MCG/ACT inhaler [Pharmacy Med Name: FLOVENT  MCG ORAL INH 120INH] 12 g      Sig: INHALE 2 PUFFS INTO THE LUNGS TWICE DAILY       Inhaled Steroids Protocol Failed - 1/2/2020 12:52 PM        Failed - Asthma control assessment score within normal limits in last 6 months     Please review ACT score.           Passed - Patient is age 12 or older        Passed - Medication is active on med list        Passed - Recent (6 mo) or future (30 days) visit within the authorizing provider's specialty     Patient had office visit in the last 6 months or has a visit in the next 30 days with authorizing provider or within the authorizing provider's specialty.  See \"Patient Info\" tab in inbasket, or \"Choose Columns\" in Meds & Orders section of the refill encounter.              "

## 2020-01-03 RX ORDER — FLUTICASONE PROPIONATE 220 UG/1
AEROSOL, METERED RESPIRATORY (INHALATION)
Qty: 12 G | Refills: 3 | Status: SHIPPED | OUTPATIENT
Start: 2020-01-03 | End: 2021-08-27

## 2020-01-03 NOTE — TELEPHONE ENCOUNTER
Routing refill request to provider for review/approval because:  Drug not on the [facility] refill protocol for dx of bronchitis  Valorie MOSES RN

## 2020-01-08 ENCOUNTER — TRANSFERRED RECORDS (OUTPATIENT)
Dept: HEALTH INFORMATION MANAGEMENT | Facility: CLINIC | Age: 60
End: 2020-01-08

## 2020-01-08 LAB
ALT SERPL-CCNC: 48 U/L (ref 0–78)
AST SERPL-CCNC: 41 U/L (ref 0–37)
CREAT SERPL-MCNC: 0.78 MG/DL (ref 0.6–1.02)
GFR SERPL CREATININE-BSD FRML MDRD: >60 ML/MIN/1.73M2

## 2020-01-24 NOTE — BRIEF OP NOTE
[facility] Brief Operative Note    Pre-operative diagnosis: POST MENOPAUSAL BLEEDING   Post-operative diagnosis same   Procedure: Procedure(s):  HYSTEROSCOPY, WITH DILATION AND CURETTAGE OF UTERUS USING MORCELLATOR (MYOSURE)   Surgeon(s): Surgeon(s) and Role:     * Chelle Rebollar MD - Primary   Estimated blood loss: 5 mL    Specimens: ID Type Source Tests Collected by Time Destination   A : endometrial curettings Tissue Endometrium SURGICAL PATHOLOGY EXAM Chelle Rebollar MD 5/13/2019 10:38 AM       Findings:          Pt to PAC stable. The uterus sounded to 8cm. There was shaggy tissue throughout the endometrium obscuring the tubal ostia. Large amount of tissue was obtained. Deficit of 360 ml of NS     Chelle Rebollar MD   36.7

## 2020-02-24 DIAGNOSIS — F43.9 STRESS: ICD-10-CM

## 2020-02-24 DIAGNOSIS — R42 DIZZINESS AND GIDDINESS: ICD-10-CM

## 2020-02-25 RX ORDER — ALPRAZOLAM 0.25 MG
TABLET ORAL
Qty: 30 TABLET | Refills: 0 | Status: SHIPPED | OUTPATIENT
Start: 2020-02-25 | End: 2021-08-27

## 2020-02-25 NOTE — TELEPHONE ENCOUNTER
Last filled 3/14/19.  #45 tablets lasted one year.    To PCP for review/sign if appropriate.    Thank you,  Marietta Fu RN on 2/25/2020 at 1:10 PM

## 2020-02-25 NOTE — TELEPHONE ENCOUNTER
Controlled Substance Refill Request for Xanax  Problem List Complete:  No     PROVIDER TO CONSIDER COMPLETION OF PROBLEM LIST AND OVERVIEW/CONTROLLED SUBSTANCE AGREEMENT    Last Written Prescription Date:  03/14/19  Last Fill Quantity: 45,   # refills: 0    THE MOST RECENT OFFICE VISIT MUST BE WITHIN THE PAST 3 MONTHS. AT LEAST ONE FACE TO FACE VISIT MUST OCCUR EVERY 6 MONTHS. ADDITIONAL VISITS CAN BE VIRTUAL.  (THIS STATEMENT SHOULD BE DELETED.)    Last Office Visit with [facility] primary care provider:12/06/19     Future Office visit:     Controlled substance agreement:   Encounter-Level CSA:    There are no encounter-level csa.     Patient-Level CSA:    There are no patient-level csa.         Last Urine Drug Screen: No results found for: CDAUT, No results found for: COMDAT, No results found for: THC13, PCP13, COC13, MAMP13, OPI13, AMP13, BZO13, TCA13, MTD13, BAR13, OXY13, PPX13, BUP13     Processing:  Rx to be electronically transmitted to pharmacy by provider      [email or web address]       checked in past 3 months?  No, route to LAURIE Tovar MA

## 2020-02-28 ENCOUNTER — TRANSFERRED RECORDS (OUTPATIENT)
Dept: HEALTH INFORMATION MANAGEMENT | Facility: CLINIC | Age: 60
End: 2020-02-28

## 2020-03-01 DIAGNOSIS — K22.9 DISORDER OF ESOPHAGUS: ICD-10-CM

## 2020-03-01 DIAGNOSIS — K51.919 ULCERATIVE COLITIS WITH COMPLICATION, UNSPECIFIED LOCATION (H): ICD-10-CM

## 2020-03-03 RX ORDER — CHLORDIAZEPOXIDE HYDROCHLORIDE AND CLIDINIUM BROMIDE 5; 2.5 MG/1; MG/1
CAPSULE ORAL
Qty: 30 CAPSULE | Refills: 0 | Status: SHIPPED | OUTPATIENT
Start: 2020-03-03 | End: 2021-08-27

## 2020-03-03 NOTE — TELEPHONE ENCOUNTER
Routing refill request to provider for review/approval because:  Drug not on the [facility] refill protocol   A break in medication    NAM GasparN, RN  [facility]

## 2020-03-03 NOTE — TELEPHONE ENCOUNTER
Requested Prescriptions   Pending Prescriptions Disp Refills     clidinium-chlordiazePOXIDE (LIBRAX) 5-2.5 MG capsule [Pharmacy Med Name: CHLORDIAZEPOXIDE/CLIDINIUM5-2.5MG C] 30 capsule 12     Sig: TAKE ONE CAPSULE BY MOUTH DAILY AS NEEDED  Last Written Prescription Date:  08/24/2018  Last Fill Quantity: 30 capsule,  # refills: 12   Last Office Visit: 12/6/2019 [place]  Future Office Visit:            There is no refill protocol information for this order

## 2020-03-11 ENCOUNTER — TRANSFERRED RECORDS (OUTPATIENT)
Dept: HEALTH INFORMATION MANAGEMENT | Facility: CLINIC | Age: 60
End: 2020-03-11

## 2020-03-30 DIAGNOSIS — F41.8 SITUATIONAL ANXIETY: ICD-10-CM

## 2020-03-30 RX ORDER — DESVENLAFAXINE 25 MG/1
25 TABLET, EXTENDED RELEASE ORAL DAILY
Qty: 90 TABLET | Refills: 1 | Status: SHIPPED | OUTPATIENT
Start: 2020-03-30 | End: 2020-09-03

## 2020-03-30 NOTE — TELEPHONE ENCOUNTER
"Requested Prescriptions   Pending Prescriptions Disp Refills     desvenlafaxine succinate (PRISTIQ) 25 MG 24 hr tablet  Last Written Prescription Date:  12/4/19  Last Fill Quantity: 90,  # refills: 2   Last office visit: 12/6/2019 with prescribing provider:  Samanta   Future Office Visit:     90 tablet 2     Sig: Take 1 tablet (25 mg) by mouth daily       Serotonin-Norepinephrine Reuptake Inhibitors  Passed - 3/30/2020  4:54 PM        Passed - Blood pressure under 140/90 in past 12 months     BP Readings from Last 3 Encounters:   12/06/19 111/68   09/10/19 95/56   08/09/19 104/69                 Passed - Recent (12 mo) or future (30 days) visit within the authorizing provider's specialty     Patient has had an office visit with the authorizing provider or a provider within the authorizing providers department within the previous 12 mos or has a future within next 30 days. See \"Patient Info\" tab in inbasket, or \"Choose Columns\" in Meds & Orders section of the refill encounter.              Passed - Medication is active on med list        Passed - Patient is age 18 or older        Passed - No active pregnancy on record        Passed - Normal serum creatinine on file in past 12 months     Recent Labs   Lab Test 01/08/20   CR 0.78       Ok to refill medication if creatinine is low          Passed - No positive pregnancy test in past 12 months             "

## 2020-04-10 DIAGNOSIS — T78.40XS ALLERGIC STATE, SEQUELA: ICD-10-CM

## 2020-04-10 RX ORDER — ALBUTEROL SULFATE 90 UG/1
POWDER, METERED RESPIRATORY (INHALATION)
Qty: 1 EACH | Refills: 2 | Status: SHIPPED | OUTPATIENT
Start: 2020-04-10

## 2020-04-10 NOTE — TELEPHONE ENCOUNTER
"PROAIR RESPICLICK 108 (90 Base) MCG/ACT inhaler    Last Written Prescription Date:  03/19/2019  Last Fill Quantity: 1,  # refills: 11   Last office visit: 12/6/2019 with prescribing provider:  Samanta   Future Office Visit:  Unknown     Requested Prescriptions   Pending Prescriptions Disp Refills     PROAIR RESPICLICK 108 (90 Base) MCG/ACT inhaler [Pharmacy Med Name: PROAIR RESPICLICK ORAL PWD INH(200)]       Sig: INHALE 2 PUFFS INTO THE LUNGS EVERY 6 HOURS AS NEEDED FOR SHORTNESS OF BREATH OR DIFFICULT BREATHING OR WHEEZING       Asthma Maintenance Inhalers - Anticholinergics Failed - 4/10/2020  3:21 AM        Failed - Asthma control assessment score within normal limits in last 6 months     Please review ACT score.           Passed - Patient is age 12 years or older        Passed - Medication is active on med list        Passed - Recent (6 mo) or future (30 days) visit within the authorizing provider's specialty     Patient had office visit in the last 6 months or has a visit in the next 30 days with authorizing provider or within the authorizing provider's specialty.  See \"Patient Info\" tab in inbasket, or \"Choose Columns\" in Meds & Orders section of the refill encounter.           Short-Acting Beta Agonist Inhalers Protocol  Failed - 4/10/2020  3:21 AM        Failed - Asthma control assessment score within normal limits in last 6 months     Please review ACT score.           Passed - Patient is age 12 or older        Passed - Medication is active on med list        Passed - Recent (6 mo) or future (30 days) visit within the authorizing provider's specialty     Patient had office visit in the last 6 months or has a visit in the next 30 days with authorizing provider or within the authorizing provider's specialty.  See \"Patient Info\" tab in inbasket, or \"Choose Columns\" in Meds & Orders section of the refill encounter.                 "

## 2020-04-10 NOTE — TELEPHONE ENCOUNTER
Prescription approved per [facility] Refill Protocol.  Valorie MOSES RN

## 2020-05-06 ENCOUNTER — TRANSFERRED RECORDS (OUTPATIENT)
Dept: HEALTH INFORMATION MANAGEMENT | Facility: CLINIC | Age: 60
End: 2020-05-06

## 2020-05-06 LAB
ALT SERPL-CCNC: 33 U/L (ref 0–78)
AST SERPL-CCNC: 27 U/L (ref 0–37)
CREAT SERPL-MCNC: 0.94 MG/DL (ref 0.6–1.02)

## 2020-06-12 ENCOUNTER — TRANSFERRED RECORDS (OUTPATIENT)
Dept: HEALTH INFORMATION MANAGEMENT | Facility: CLINIC | Age: 60
End: 2020-06-12

## 2020-06-24 ENCOUNTER — TRANSFERRED RECORDS (OUTPATIENT)
Dept: HEALTH INFORMATION MANAGEMENT | Facility: CLINIC | Age: 60
End: 2020-06-24

## 2020-06-24 LAB
ALT SERPL-CCNC: 33 U/L (ref 0–78)
AST SERPL-CCNC: 30 U/L (ref 0–37)
CREAT SERPL-MCNC: 0.93 MG/DL (ref 0.6–1.02)

## 2020-07-27 ENCOUNTER — TELEPHONE (OUTPATIENT)
Dept: FAMILY MEDICINE | Facility: CLINIC | Age: 60
End: 2020-07-27

## 2020-07-27 NOTE — TELEPHONE ENCOUNTER
Panel Management Review      Patient has the following on her problem list: None      Composite cancer screening  Chart review shows that this patient is due/due soon for the following Mammogram  Summary:    Patient is due/failing the following:   MAMMOGRAM    Action needed:   Patient needs office visit for mammogram.    Type of outreach:    Sent [facility] message.    Questions for provider review:    None                                                                                                                                    Yolanda Arango MA on 7/27/2020 at 2:01 PM

## 2020-08-19 ENCOUNTER — TRANSFERRED RECORDS (OUTPATIENT)
Dept: HEALTH INFORMATION MANAGEMENT | Facility: CLINIC | Age: 60
End: 2020-08-19

## 2020-08-19 LAB
ALT SERPL-CCNC: 36 U/L (ref 0–78)
AST SERPL-CCNC: 30 U/L (ref 0–37)
CREAT SERPL-MCNC: 0.91 MG/DL (ref 0.6–1.02)

## 2020-09-23 ENCOUNTER — TRANSFERRED RECORDS (OUTPATIENT)
Dept: HEALTH INFORMATION MANAGEMENT | Facility: CLINIC | Age: 60
End: 2020-09-23

## 2020-09-29 ENCOUNTER — TRANSFERRED RECORDS (OUTPATIENT)
Dept: HEALTH INFORMATION MANAGEMENT | Facility: CLINIC | Age: 60
End: 2020-09-29

## 2020-10-05 DIAGNOSIS — F41.8 SITUATIONAL ANXIETY: ICD-10-CM

## 2020-10-06 RX ORDER — DESVENLAFAXINE 25 MG/1
TABLET, EXTENDED RELEASE ORAL
Qty: 30 TABLET | Refills: 11 | Status: SHIPPED | OUTPATIENT
Start: 2020-10-06 | End: 2021-07-15

## 2020-10-14 ENCOUNTER — TRANSFERRED RECORDS (OUTPATIENT)
Dept: HEALTH INFORMATION MANAGEMENT | Facility: CLINIC | Age: 60
End: 2020-10-14

## 2020-10-14 LAB
ALT SERPL-CCNC: 26 U/L (ref 0–78)
AST SERPL-CCNC: 21 U/L (ref 0–37)
CREAT SERPL-MCNC: 0.93 MG/DL (ref 0.6–1.02)

## 2020-12-09 ENCOUNTER — TRANSFERRED RECORDS (OUTPATIENT)
Dept: HEALTH INFORMATION MANAGEMENT | Facility: CLINIC | Age: 60
End: 2020-12-09

## 2020-12-09 LAB
ALT SERPL-CCNC: 27 U/L (ref 0–78)
AST SERPL-CCNC: 21 U/L (ref 0–37)
CREAT SERPL-MCNC: 0.84 MG/DL (ref 0.6–1.02)

## 2021-01-01 ENCOUNTER — TRANSFERRED RECORDS (OUTPATIENT)
Dept: MULTI SPECIALTY CLINIC | Facility: CLINIC | Age: 61
End: 2021-01-01

## 2021-01-01 LAB — PAP SMEAR - HIM PATIENT REPORTED: NEGATIVE

## 2021-01-15 ENCOUNTER — HEALTH MAINTENANCE LETTER (OUTPATIENT)
Age: 61
End: 2021-01-15

## 2021-01-18 ENCOUNTER — HOSPITAL ENCOUNTER (OUTPATIENT)
Dept: MAMMOGRAPHY | Facility: CLINIC | Age: 61
Discharge: HOME | End: 2021-01-18
Attending: OBSTETRICS & GYNECOLOGY | Admitting: OBSTETRICS & GYNECOLOGY
Payer: COMMERCIAL

## 2021-01-18 DIAGNOSIS — Z12.31 VISIT FOR SCREENING MAMMOGRAM: ICD-10-CM

## 2021-01-18 PROCEDURE — 77067 SCR MAMMO BI INCL CAD: CPT

## 2021-07-13 DIAGNOSIS — F41.8 SITUATIONAL ANXIETY: ICD-10-CM

## 2021-07-15 RX ORDER — DESVENLAFAXINE 25 MG/1
TABLET, EXTENDED RELEASE ORAL
Qty: 90 TABLET | Refills: 0 | Status: SHIPPED | OUTPATIENT
Start: 2021-07-15 | End: 2021-07-23

## 2021-07-15 NOTE — TELEPHONE ENCOUNTER
Routing refill request to provider for review/approval because:  Patient needs to be seen because it has been more than 1 year since last office visit.  BP Readings from Last 3 Encounters:   12/06/19 111/68   09/10/19 95/56   08/09/19 104/69     Lila Gooden RN

## 2021-07-16 NOTE — TELEPHONE ENCOUNTER
LVM letting pt know that she is due for an OV with PCP in order to keep getting Rx refilled. Can also see Luci Harper Wolters, or Jono if desired for sooner appt.

## 2021-07-23 DIAGNOSIS — F41.8 SITUATIONAL ANXIETY: ICD-10-CM

## 2021-07-23 RX ORDER — DESVENLAFAXINE 25 MG/1
25 TABLET, EXTENDED RELEASE ORAL DAILY
Qty: 90 TABLET | Refills: 0 | Status: SHIPPED | OUTPATIENT
Start: 2021-07-23 | End: 2021-10-13

## 2021-07-23 NOTE — TELEPHONE ENCOUNTER
Please send Rx to    [id number] - [place], [place] - [address] AT [facility] [address]

## 2021-07-23 NOTE — TELEPHONE ENCOUNTER
Reason for Call:  Other prescription    Detailed comments: Patient has scheduled a Physical for 8/27/2021, due to the date she most likely will run out of a medication and is wondering if she could possibly get a partial just until the appt date. She will run out of desvenlafaxine succinate (PRISTIQ) 25 MG 24 hr tablet around 8/12 or 8/13 and just would need some medicine until her next physical.    Phone Number Patient can be reached at: Cell number on file:    Telephone Information:   Mobile [phone]       Best Time: anytime    Can we leave a detailed message on this number? YES    Call taken on 7/23/2021 at 10:19 AM by Chapo Keenan

## 2021-08-27 ENCOUNTER — OFFICE VISIT (OUTPATIENT)
Dept: FAMILY MEDICINE | Facility: CLINIC | Age: 61
End: 2021-08-27
Payer: COMMERCIAL

## 2021-08-27 ENCOUNTER — TELEPHONE (OUTPATIENT)
Dept: FAMILY MEDICINE | Facility: CLINIC | Age: 61
End: 2021-08-27

## 2021-08-27 VITALS
SYSTOLIC BLOOD PRESSURE: 99 MMHG | RESPIRATION RATE: 16 BRPM | TEMPERATURE: 96.5 F | WEIGHT: 195 LBS | OXYGEN SATURATION: 97 % | HEIGHT: 61 IN | HEART RATE: 93 BPM | DIASTOLIC BLOOD PRESSURE: 65 MMHG | BODY MASS INDEX: 36.82 KG/M2

## 2021-08-27 DIAGNOSIS — K22.9 DISORDER OF ESOPHAGUS: ICD-10-CM

## 2021-08-27 DIAGNOSIS — D84.9 IMMUNOCOMPROMISED (H): ICD-10-CM

## 2021-08-27 DIAGNOSIS — K51.919 ULCERATIVE COLITIS WITH COMPLICATION, UNSPECIFIED LOCATION (H): ICD-10-CM

## 2021-08-27 DIAGNOSIS — Z13.6 CARDIOVASCULAR SCREENING; LDL GOAL LESS THAN 100: ICD-10-CM

## 2021-08-27 DIAGNOSIS — Z00.00 ENCOUNTER FOR ANNUAL WELLNESS VISIT (AWV) IN MEDICARE PATIENT: Primary | ICD-10-CM

## 2021-08-27 DIAGNOSIS — J45.30 MILD PERSISTENT ASTHMA WITHOUT COMPLICATION: ICD-10-CM

## 2021-08-27 DIAGNOSIS — I83.93 ASYMPTOMATIC VARICOSE VEINS OF BOTH LOWER EXTREMITIES: ICD-10-CM

## 2021-08-27 DIAGNOSIS — Z12.31 ENCOUNTER FOR SCREENING MAMMOGRAM FOR BREAST CANCER: ICD-10-CM

## 2021-08-27 DIAGNOSIS — Z23 HIGH PRIORITY FOR 2019-NCOV VACCINE: ICD-10-CM

## 2021-08-27 DIAGNOSIS — F43.9 STRESS: ICD-10-CM

## 2021-08-27 PROCEDURE — 91301 COVID-19,PF,MODERNA: CPT | Performed by: INTERNAL MEDICINE

## 2021-08-27 PROCEDURE — 0011A COVID-19,PF,MODERNA: CPT | Performed by: INTERNAL MEDICINE

## 2021-08-27 PROCEDURE — 80061 LIPID PANEL: CPT | Performed by: INTERNAL MEDICINE

## 2021-08-27 PROCEDURE — 84443 ASSAY THYROID STIM HORMONE: CPT | Performed by: INTERNAL MEDICINE

## 2021-08-27 PROCEDURE — 36415 COLL VENOUS BLD VENIPUNCTURE: CPT | Performed by: INTERNAL MEDICINE

## 2021-08-27 PROCEDURE — 80048 BASIC METABOLIC PNL TOTAL CA: CPT | Performed by: INTERNAL MEDICINE

## 2021-08-27 PROCEDURE — 99396 PREV VISIT EST AGE 40-64: CPT | Mod: 25 | Performed by: INTERNAL MEDICINE

## 2021-08-27 PROCEDURE — 99213 OFFICE O/P EST LOW 20 MIN: CPT | Mod: 25 | Performed by: INTERNAL MEDICINE

## 2021-08-27 RX ORDER — FLUTICASONE PROPIONATE 220 UG/1
AEROSOL, METERED RESPIRATORY (INHALATION)
Qty: 12 G | Refills: 3 | Status: SHIPPED | OUTPATIENT
Start: 2021-08-27

## 2021-08-27 RX ORDER — ALPRAZOLAM 0.25 MG
TABLET ORAL
Qty: 30 TABLET | Refills: 0 | Status: SHIPPED | OUTPATIENT
Start: 2021-08-27 | End: 2022-09-30

## 2021-08-27 RX ORDER — CHLORDIAZEPOXIDE HYDROCHLORIDE AND CLIDINIUM BROMIDE 5; 2.5 MG/1; MG/1
CAPSULE ORAL
Qty: 30 CAPSULE | Refills: 0 | Status: SHIPPED | OUTPATIENT
Start: 2021-08-27 | End: 2021-08-30

## 2021-08-27 ASSESSMENT — ENCOUNTER SYMPTOMS
HEMATOCHEZIA: 0
HEARTBURN: 0
MYALGIAS: 0
FREQUENCY: 0
DIZZINESS: 0
JOINT SWELLING: 0
FEVER: 0
SORE THROAT: 0
COUGH: 0
PARESTHESIAS: 0
ABDOMINAL PAIN: 0
CHILLS: 0
BREAST MASS: 0
DYSURIA: 0
HEADACHES: 0
WEAKNESS: 0
NAUSEA: 0
NERVOUS/ANXIOUS: 0
CONSTIPATION: 0
ARTHRALGIAS: 0
SHORTNESS OF BREATH: 0
HEMATURIA: 0
DIARRHEA: 0
EYE PAIN: 0
PALPITATIONS: 0

## 2021-08-27 ASSESSMENT — MIFFLIN-ST. JEOR: SCORE: 1386.89

## 2021-08-27 NOTE — PROGRESS NOTES
SUBJECTIVE:   CC: John Steven is an 61 year old woman who presents for preventive health visit.   This is extremely nice 61 years old lady who has ulcerative colitis which is stable with the help of methotrexate and Remicade  She took Aníbal & Aníbal vaccine previously  She is traveling to [place] soon  She is otherwise doing well except she has back spasms at times and esophageal issues with the swallowing  She sometimes spits phlegm and even throws up  And takes muscle relaxants for that  Her ankles swell up and she uses compression socks with travel  She is on hormone replacement therapy  And sees gynecology after hysterectomy  Patient has been advised of split billing requirements and indicates understanding: Yes  Healthy Habits:     Getting at least 3 servings of Calcium per day:  Yes    Bi-annual eye exam:  Yes    Dental care twice a year:  Yes    Sleep apnea or symptoms of sleep apnea:  None    Diet:  Regular (no restrictions)    Frequency of exercise:  2-3 days/week    Duration of exercise:  45-60 minutes    Taking medications regularly:  Yes    Medication side effects:  None    PHQ-2 Total Score: 0    Additional concerns today:  No              Today's PHQ-2 Score:   PHQ-2 ( 1999 Pfizer) 8/27/2021   Q1: Little interest or pleasure in doing things 0   Q2: Feeling down, depressed or hopeless 0   PHQ-2 Score 0   Q1: Little interest or pleasure in doing things Not at all   Q2: Feeling down, depressed or hopeless Not at all   PHQ-2 Score 0       Abuse: Current or Past (Physical, Sexual or Emotional) - No  Do you feel safe in your environment? Yes    Have you ever done Advance Care Planning? (For example, a Health Directive, POLST, or a discussion with a medical provider or your loved ones about your wishes): No, advance care planning information given to patient to review.  Patient plans to discuss their wishes with loved ones or provider.      Social History     Tobacco Use     Smoking status: Former  Smoker     Packs/day: 1.00     Years: 10.00     Pack years: 10.00     Types: Cigarettes     Quit date: 1989     Years since quittin.6     Smokeless tobacco: Never Used   Substance Use Topics     Alcohol use: Yes     Alcohol/week: 0.0 standard drinks     Comment: 2-3 drinks  times a week     If you drink alcohol do you typically have >3 drinks per day or >7 drinks per week? No    Alcohol Use 2021   Prescreen: >3 drinks/day or >7 drinks/week? No   Prescreen: >3 drinks/day or >7 drinks/week? -       Reviewed orders with patient.  Reviewed health maintenance and updated orders accordingly - Yes  Patient Active Problem List   Diagnosis     Other specified disorder of rectum and anus     Ulcerative colitis (H)     Other urethritis(597.89)     Other and unspecified hormones and synthetic substitutes causing adverse effect in therapeutic use     Allergic rhinitis     Disorder of esophagus     Rosacea     Dizziness and giddiness     Obesity     Immunocompromised (H)     Post-operative state     Past Surgical History:   Procedure Laterality Date     ABDOMEN SURGERY       BIOPSY      BREAST     DILATION AND CURETTAGE, HYSTEROSCOPY DIAGNOSTIC, COMBINED N/A 2015    Procedure: COMBINED DILATION AND CURETTAGE, HYSTEROSCOPY DIAGNOSTIC;  Surgeon: Chelle Rebollar MD;  Location: [facility]     DILATION AND CURETTAGE, OPERATIVE HYSTEROSCOPY WITH MORCELLATOR, COMBINED N/A 2019    Procedure: HYSTEROSCOPY, WITH DILATION AND CURETTAGE OF UTERUS USING MORCELLATOR (MYOSURE);  Surgeon: Chelle Rebollar MD;  Location:  OR     GI SURGERY       GYN SURGERY       x3     LAPAROSCOPIC HYSTERECTOMY TOTAL, BILATERAL SALPINGO-OOPHORECTOMY, COMBINED N/A 2019    Procedure: TOTAL LAPAROSCOPIC HYSTERECTOMY, BILATERAL SALPINGO OOPHORECTOMY, LYSIS OF ADHESIONS, CYSTOCOPY, RIGID PROCTO, laparoscopic repair seromyotomy;  Surgeon: Chelle Rebollar MD;  Location:  OR     [facility] NONSPECIFIC PROCEDURE       Heller myotomy     [facility] NONSPECIFIC PROCEDURE      EB for spotting,fibroid     [facility] NONSPECIFIC PROCEDURE  2007    rt breast biopsy     [facility]         Social History     Tobacco Use     Smoking status: Former Smoker     Packs/day: 1.00     Years: 10.00     Pack years: 10.00     Types: Cigarettes     Quit date: 1989     Years since quittin.6     Smokeless tobacco: Never Used   Substance Use Topics     Alcohol use: Yes     Alcohol/week: 0.0 standard drinks     Comment: 2-3 drinks  times a week     Family History   Problem Relation Age of Onset     Cerebrovascular Disease Mother         80     Diabetes Mother      Breast Cancer Mother         60     Blood Disease Mother         merkel cell     Obesity Mother      Obesity Father      Diabetes Sister      Obesity Sister      Hypertension Sister      Obesity Sister      Gastrointestinal Disease Brother         Collagenous colitis     Diabetes Brother      Obesity Brother      Diabetes Maternal Grandmother      Asthma Son          Current Outpatient Medications   Medication Sig Dispense Refill     acyclovir (ZOVIRAX) 5 % cream Apply topically 5 times daily Apply to cold sores/fever blisters (Patient taking differently: Apply topically 5 times daily Apply to cold sores/fever blisters as needed) 5 g 3     ALPRAZolam (XANAX) 0.25 MG tablet TAKE ONE TABLET BY MOUTH DAILY AS NEEDED FOR ANXIETY. 30 tablet 0     clidinium-chlordiazePOXIDE (LIBRAX) 5-2.5 MG capsule For spasms 30 capsule 0     cyclobenzaprine (FLEXERIL) 10 MG tablet TAKE 1/2 TO 1 TABLET(5 TO 10 MG) BY MOUTH EVERY 8 HOURS AS NEEDED FOR MUSCLE SPASMS 30 tablet 3     desvenlafaxine succinate (PRISTIQ) 25 MG 24 hr tablet Take 1 tablet (25 mg) by mouth daily 90 tablet 0     diphenhydrAMINE (BENADRYL) 25 MG capsule Take 25-50 mg by mouth daily Post nasal drip causing esophageal spasms       estradiol (ESTRACE) 2 MG tablet Take 2 mg by mouth daily       fluticasone (FLOVENT HFA)  220 MCG/ACT inhaler INHALE 2 PUFFS INTO THE LUNGS TWICE DAILY 12 g 3     folic acid (FOLVITE) 1 MG tablet TAKE 1 TABLET BY MOUTH EVERY DAY. 90 tablet 3     levocetirizine (XYZAL) 5 MG tablet Take 5 mg by mouth every evening       LEVSIN/SL 0.125 MG SL SUBL 1 TAB 4 TIMES DAILY, BEFORE MEALS AND AT BEDTIME 30 12     Magnesium 500 MG CAPS Take 500 mg by mouth daily       methotrexate 2.5 MG tablet CHEMO Take 12.5 mg by mouth every 7 days (5 x 2.5 mg = 12.5 mg dose)       metroNIDAZOLE (METROGEL) 1 % gel Apply  topically 2 times daily. 30 g 12     NIFEdipine ER OSMOTIC (NIFEDICAL XL) 60 MG 24 hr tablet Take 60 mg by mouth daily       order for DME Equipment being ordered: DME Compression stockings 2 pairs, knee high 20-30 mm pressure and Thigh high 2 pairs 20-30 mm pressure. 4 each 1     order for DME Equipment being ordered: Medium compression stockings   20-30 mm 2 each 1     order for DME Equipment being ordered: Compression stockings  20-30 mm pressure 2 each 3     order for DME Equipment being ordered:  Compression pump  Flexitouch 1 each 0     PROAIR RESPICLICK 108 (90 Base) MCG/ACT inhaler INHALE 2 PUFFS INTO THE LUNGS EVERY 6 HOURS AS NEEDED FOR SHORTNESS OF BREATH OR DIFFICULT BREATHING OR WHEEZING 1 each 2     Probiotic Product (PROBIOTIC DAILY PO) Take 1 capsule by mouth daily 69 mg/ 15 Billion CFU       psyllium 0.52 G capsule Take 1 capsule by mouth daily (Fiber)       REMICADE 100 MG injection Inject into the vein every 2 months        Vitamin D, Cholecalciferol, 1000 units TABS Take 1,000 Units by mouth daily       albuterol (PROAIR HFA, PROVENTIL HFA, VENTOLIN HFA) 108 (90 BASE) MCG/ACT inhaler Inhale 2 puffs into the lungs every 6 hours as needed for shortness of breath / dyspnea or wheezing       Nutritional Supplements (NUTRITIONAL SUPPLEMENT PO) Take 2 capsules by mouth daily GABINO HYDRATE SUPPLEMENT 600 mg       Allergies   Allergen Reactions     Alcohol Other (See Comments)     flushing     Fentanyl  Nausea and Vomiting     Food      Corn, mushrooms, all peppers     Morphine Nausea and Vomiting     vomiting     Pcn [Penicillins] Hives     Shellfish Allergy Hives     Hives over entire body       Breast Cancer Screening:    FHS-7:   Breast CA Risk Assessment (FHS-7) 8/27/2021   Did any of your first-degree relatives have breast or ovarian cancer? Yes   Did any of your relatives have bilateral breast cancer? No   Did any man in your family have breast cancer? No   Did any woman in your family have breast and ovarian cancer? Yes   Did any woman in your family have breast cancer before age 50 y? No   Do you have 2 or more relatives with breast and/or ovarian cancer? No   Do you have 2 or more relatives with breast and/or bowel cancer? No       Mammogram Screening: Recommended mammography every 1-2 years with patient discussion and risk factor consideration  Pertinent mammograms are reviewed under the imaging tab.    History of abnormal Pap smear: NO - age 30-65 PAP every 5 years with negative HPV co-testing recommended     Reviewed and updated as needed this visit by clinical staff  Tobacco  Allergies  Meds   Med Hx  Surg Hx  Fam Hx          Reviewed and updated as needed this visit by Provider    Meds   Med Hx  Surg Hx  Fam Hx         Past Medical History:   Diagnosis Date     Allergic rhinitis, cause unspecified      Dizziness and giddiness      Esophageal spasm 2003    nifedipine, s/p heller myotomy     Immunocompromised (H) 8/10/2018     Macrocytosis      Obesity      Other and unspecified hormones and synthetic substitutes causing adverse effect in therapeutic use     On BCP, Dr Mijares     Other urethritis(597.89)      PONV (postoperative nausea and vomiting)      Rosacea      Travel     valium,xanax narcotic agreement     Ulcerative colitis, unspecified     F/B [facility]     Uncomplicated asthma      Unspecified disorder of esophagus     Achalasia, spasm     Vertigo       Past Surgical History:  "  Procedure Laterality Date     ABDOMEN SURGERY       BIOPSY      BREAST     DILATION AND CURETTAGE, HYSTEROSCOPY DIAGNOSTIC, COMBINED N/A 2015    Procedure: COMBINED DILATION AND CURETTAGE, HYSTEROSCOPY DIAGNOSTIC;  Surgeon: Chelle Rebollar MD;  Location: [facility]     DILATION AND CURETTAGE, OPERATIVE HYSTEROSCOPY WITH MORCELLATOR, COMBINED N/A 2019    Procedure: HYSTEROSCOPY, WITH DILATION AND CURETTAGE OF UTERUS USING MORCELLATOR (MYOSURE);  Surgeon: Chelle Rebollar MD;  Location:  OR     GI SURGERY       GYN SURGERY       x3     LAPAROSCOPIC HYSTERECTOMY TOTAL, BILATERAL SALPINGO-OOPHORECTOMY, COMBINED N/A 2019    Procedure: TOTAL LAPAROSCOPIC HYSTERECTOMY, BILATERAL SALPINGO OOPHORECTOMY, LYSIS OF ADHESIONS, CYSTOCOPY, RIGID PROCTO, laparoscopic repair seromyotomy;  Surgeon: Chelle Rebollar MD;  Location:  OR     [facility] NONSPECIFIC PROCEDURE      Heller myotomy     [facility] NONSPECIFIC PROCEDURE      EB for spotting,fibroid     [facility] NONSPECIFIC PROCEDURE      rt breast biopsy     [facility] COLONOSCOPY THRU STOMA, DIAGNOSTIC         Review of Systems  10 point ROS of systems including Constitutional, Eyes, Respiratory, Cardiovascular, Gastroenterology, Genitourinary, Integumentary, Muscularskeletal, Psychiatric were all negative except for pertinent positives noted in my HPI.       OBJECTIVE:   BP 99/65 (BP Location: Left arm, Patient Position: Chair, Cuff Size: Adult Large)   Pulse 93   Temp (!) 96.5  F (35.8  C) (Temporal)   Resp 16   Ht 1.549 m (5' 1\")   Wt 88.5 kg (195 lb)   SpO2 97%   BMI 36.84 kg/m    Physical Exam  GENERAL APPEARANCE: healthy, alert and no distress  EYES: Eyes grossly normal to inspection, PERRL and conjunctivae and sclerae normal  HENT: ear canals and TM's normal, nose and mouth without ulcers or lesions, oropharynx clear and oral mucous membranes moist  NECK: no adenopathy, no asymmetry, masses, or scars and thyroid normal to " palpation  RESP: lungs clear to auscultation - no rales, rhonchi or wheezes  BREAST: normal without masses, tenderness or nipple discharge and no palpable axillary masses or adenopathy  CV: regular rate and rhythm, normal S1 S2, no S3 or S4, no murmur, click or rub, no peripheral edema and peripheral pulses strong  ABDOMEN: soft, nontender, no hepatosplenomegaly, no masses and bowel sounds normal  MS: no musculoskeletal defects are noted and gait is age appropriate without ataxia  SKIN: Photodamage with multiple benign lesions and an insect bite on the chest wall no suspicious lesions or rashes  NEURO: Normal strength and tone, sensory exam grossly normal, mentation intact and speech normal  PSYCH: mentation appears normal and affect normal/bright    Transferred Records on 12/09/2020   Component Date Value Ref Range Status     ALT 12/09/2020 27  0 - 78 U/L Final     AST 12/09/2020 21  0 - 37 U/L Final     Creatinine 12/09/2020 0.84  0.60 - 1.02 mg/dL Final         ASSESSMENT/PLAN:   John was seen today for physical.    Diagnoses and all orders for this visit:    Encounter for annual wellness visit (AWV) in Medicare patient  Patient is up-to-date on mammogram but will do DEXA scan  She is trying to lose weight  She is up-to-date on Pap smear and colonoscopy    Immunocompromised (H) patient is on methotrexate and Remicade and we will give her maternal vaccines today  -     REVIEW OF HEALTH MAINTENANCE PROTOCOL ORDERS  -     DX Hip/Pelvis/Spine; Future  -     TSH with free T4 reflex; Future  -     Basic metabolic panel; Future  -     Compression Sleeve/Stocking Order for DME - ONLY FOR DME    Stress patient will need Xanax on hand and I will refill    -     TSH with free T4 reflex; Future  -     ALPRAZolam (XANAX) 0.25 MG tablet; TAKE ONE TABLET BY MOUTH DAILY AS NEEDED FOR ANXIETY.    Ulcerative colitis with complication, unspecified location (H)  Gastroenterology notes are appreciated and risk of DVT is discussed  "and she will use compression socks  -     DX Hip/Pelvis/Spine; Future  -     clidinium-chlordiazePOXIDE (LIBRAX) 5-2.5 MG capsule; For spasms  -     Compression Sleeve/Stocking Order for DME - ONLY FOR DME    Disorder of esophagus  -     clidinium-chlordiazePOXIDE (LIBRAX) 5-2.5 MG capsule; For spasms  Patient is advised to chew slowly and use a lot of water to eat  Mild persistent asthma without complication  -     fluticasone (FLOVENT HFA) 220 MCG/ACT inhaler; INHALE 2 PUFFS INTO THE LUNGS TWICE DAILY  She uses Flovent only as needed and otherwise ACT score is 25  Encounter for screening mammogram for breast cancer  -     MA Screening Digital Bilateral; Future    CARDIOVASCULAR SCREENING; LDL GOAL LESS THAN 100  -     Lipid Profile; Future    Asymptomatic varicose veins of both lower extremities  -     Compression Sleeve/Stocking Order for DME - ONLY FOR DME  She will use compression socks during travel        COUNSELING:  Reviewed preventive health counseling, as reflected in patient instructions       Regular exercise       Healthy diet/nutrition    Estimated body mass index is 36.84 kg/m  as calculated from the following:    Height as of this encounter: 1.549 m (5' 1\").    Weight as of this encounter: 88.5 kg (195 lb).    Weight management plan: Discussed healthy diet and exercise guidelines    She reports that she quit smoking about 32 years ago. Her smoking use included cigarettes. She has a 10.00 pack-year smoking history. She has never used smokeless tobacco.      Counseling Resources:  ATP IV Guidelines  Pooled Cohorts Equation Calculator  Breast Cancer Risk Calculator  BRCA-Related Cancer Risk Assessment: FHS-7 Tool  FRAX Risk Assessment  ICSI Preventive Guidelines  Dietary Guidelines for Americans, 2010  [employer]'s MyPlate  ASA Prophylaxis  Lung CA Screening    Rianna Sim MD  [facility]  "

## 2021-08-27 NOTE — TELEPHONE ENCOUNTER
Disp Refills Start End RAVINDER   clidinium-chlordiazePOXIDE (LIBRAX) 5-2.5 MG capsule 30 capsule 0 8/27/2021  No   Sig: For spasms     Fax received from pharmacy regarding above med    RX needs specific directions in order for pharmacy to process the prescription

## 2021-08-28 LAB
ANION GAP SERPL CALCULATED.3IONS-SCNC: 5 MMOL/L (ref 3–14)
BUN SERPL-MCNC: 15 MG/DL (ref 7–30)
CALCIUM SERPL-MCNC: 8.9 MG/DL (ref 8.5–10.1)
CHLORIDE BLD-SCNC: 109 MMOL/L (ref 94–109)
CHOLEST SERPL-MCNC: 203 MG/DL
CO2 SERPL-SCNC: 23 MMOL/L (ref 20–32)
CREAT SERPL-MCNC: 0.9 MG/DL (ref 0.52–1.04)
FASTING STATUS PATIENT QL REPORTED: YES
GFR SERPL CREATININE-BSD FRML MDRD: 69 ML/MIN/1.73M2
GLUCOSE BLD-MCNC: 110 MG/DL (ref 70–99)
HDLC SERPL-MCNC: 79 MG/DL
LDLC SERPL CALC-MCNC: 111 MG/DL
NONHDLC SERPL-MCNC: 124 MG/DL
POTASSIUM BLD-SCNC: 4.5 MMOL/L (ref 3.4–5.3)
SODIUM SERPL-SCNC: 137 MMOL/L (ref 133–144)
TRIGL SERPL-MCNC: 66 MG/DL
TSH SERPL DL<=0.005 MIU/L-ACNC: 1.1 MU/L (ref 0.4–4)

## 2021-08-28 ASSESSMENT — ASTHMA QUESTIONNAIRES: ACT_TOTALSCORE: 25

## 2021-08-30 RX ORDER — CHLORDIAZEPOXIDE HYDROCHLORIDE AND CLIDINIUM BROMIDE 5; 2.5 MG/1; MG/1
CAPSULE ORAL
Qty: 30 CAPSULE | Refills: 0 | Status: SHIPPED | OUTPATIENT
Start: 2021-08-30 | End: 2022-09-30

## 2021-08-30 NOTE — TELEPHONE ENCOUNTER
PCP pended new script with sig: Daily PRN, for spasms- please review.     Daphney Crain RN  [facility]

## 2021-09-04 ENCOUNTER — HEALTH MAINTENANCE LETTER (OUTPATIENT)
Age: 61
End: 2021-09-04

## 2021-09-17 ENCOUNTER — NURSE TRIAGE (OUTPATIENT)
Dept: FAMILY MEDICINE | Facility: CLINIC | Age: 61
End: 2021-09-17

## 2021-09-17 ENCOUNTER — HOSPITAL ENCOUNTER (EMERGENCY)
Facility: CLINIC | Age: 61
Discharge: HOME | End: 2021-09-18
Attending: EMERGENCY MEDICINE | Admitting: EMERGENCY MEDICINE
Payer: COMMERCIAL

## 2021-09-17 ENCOUNTER — APPOINTMENT (OUTPATIENT)
Dept: GENERAL RADIOLOGY | Facility: CLINIC | Age: 61
End: 2021-09-17
Attending: EMERGENCY MEDICINE
Payer: COMMERCIAL

## 2021-09-17 DIAGNOSIS — J18.9 PNEUMONIA OF RIGHT LUNG DUE TO INFECTIOUS ORGANISM, UNSPECIFIED PART OF LUNG: ICD-10-CM

## 2021-09-17 PROCEDURE — 71045 X-RAY EXAM CHEST 1 VIEW: CPT

## 2021-09-17 PROCEDURE — 250N000011 HC RX IP 250 OP 636: Performed by: EMERGENCY MEDICINE

## 2021-09-17 PROCEDURE — 85025 COMPLETE CBC W/AUTO DIFF WBC: CPT | Performed by: EMERGENCY MEDICINE

## 2021-09-17 PROCEDURE — 96361 HYDRATE IV INFUSION ADD-ON: CPT

## 2021-09-17 PROCEDURE — 87040 BLOOD CULTURE FOR BACTERIA: CPT | Performed by: EMERGENCY MEDICINE

## 2021-09-17 PROCEDURE — 258N000003 HC RX IP 258 OP 636: Performed by: EMERGENCY MEDICINE

## 2021-09-17 PROCEDURE — 87635 SARS-COV-2 COVID-19 AMP PRB: CPT | Performed by: EMERGENCY MEDICINE

## 2021-09-17 PROCEDURE — 36415 COLL VENOUS BLD VENIPUNCTURE: CPT | Performed by: EMERGENCY MEDICINE

## 2021-09-17 PROCEDURE — 96374 THER/PROPH/DIAG INJ IV PUSH: CPT

## 2021-09-17 PROCEDURE — 80053 COMPREHEN METABOLIC PANEL: CPT | Performed by: EMERGENCY MEDICINE

## 2021-09-17 PROCEDURE — C9803 HOPD COVID-19 SPEC COLLECT: HCPCS

## 2021-09-17 PROCEDURE — 99284 EMERGENCY DEPT VISIT MOD MDM: CPT | Mod: 25

## 2021-09-17 RX ORDER — SODIUM CHLORIDE 9 MG/ML
INJECTION, SOLUTION INTRAVENOUS CONTINUOUS
Status: DISCONTINUED | OUTPATIENT
Start: 2021-09-18 | End: 2021-09-18 | Stop reason: HOSPADM

## 2021-09-17 RX ORDER — ONDANSETRON 2 MG/ML
4 INJECTION INTRAMUSCULAR; INTRAVENOUS EVERY 30 MIN PRN
Status: DISCONTINUED | OUTPATIENT
Start: 2021-09-17 | End: 2021-09-18 | Stop reason: HOSPADM

## 2021-09-17 RX ADMIN — ONDANSETRON 4 MG: 2 INJECTION INTRAMUSCULAR; INTRAVENOUS at 23:47

## 2021-09-17 RX ADMIN — SODIUM CHLORIDE 1000 ML: 9 INJECTION, SOLUTION INTRAVENOUS at 23:47

## 2021-09-17 ASSESSMENT — ENCOUNTER SYMPTOMS
FEVER: 1
MYALGIAS: 1
DYSURIA: 0
FREQUENCY: 0
NAUSEA: 1
COUGH: 1
DIARRHEA: 0
RHINORRHEA: 1
CHILLS: 1
DIFFICULTY URINATING: 0
HEADACHES: 1
VOMITING: 0
SHORTNESS OF BREATH: 0
HEMATURIA: 0

## 2021-09-17 ASSESSMENT — MIFFLIN-ST. JEOR: SCORE: 1391.43

## 2021-09-17 NOTE — TELEPHONE ENCOUNTER
Called patient and notified PCP advises go to ED.     Pt agreeable to recommendation     Felisa CARLIN RN

## 2021-09-17 NOTE — TELEPHONE ENCOUNTER
"TO PCP:     Patient called     Complains of: Chills, \"unbelievalbe\" exhaustion, headache, low grade incrementally increasing temp    Is not weak, can get up and move around     101.2 F - before taking tylenol this AM     Horrible body aches     Nauseated this AM     Tuesday - rapid testing was negative for COVID19 but thought it was too early, rechecked through [facility] yesterday - returned negative for COVID19     Returned from trip to [place] recently - unsure if could have been exposed to someone who was sick     Has been taking Advil and fay seltzer cold/flu, Nyquil      Always has a chronic cough, no worse than usually     On Remicade and Methotrexate for Ulcerative colitis     Had the J&J - in March   Was given a moderna shot 8/27     Hydration - is not great, but working on that     Felt like she was \"burning up\" yesterday     Protocol would advise ED/UCC now, or OV with PCP approval. No OVs available today. Noted pt is immune compromised due to ulcerative colitis meds. No visits available today in clinic    Please advise - should we advise ER/UC?      Call back: [phone] - detailed message is verna CARLIN RN    Reason for Disposition    Fever > 101 F (38.3 C) and over 60 years of age    Additional Information    Negative: Difficult to awaken or acting confused (e.g., disoriented, slurred speech)    Negative: Pale cold skin and very weak (can't stand)    Negative: Difficulty breathing and bluish (or gray) lips or face    Negative: New onset rash with purple (or blood-colored) spots or dots    Negative: Sounds like a life-threatening emergency to the triager    Negative: Fever onset within 24 hours of receiving vaccine    Negative: Fever within 14 days of COVID-19 Exposure    Negative: Pregnant    Negative: Postpartum (from 0 to 6 weeks after delivery)    Negative: Headache and stiff neck (can't touch chin to chest)    Negative: Difficulty breathing    Negative: IV drug abuse    Negative: Fever > 103 F " (39.4 C)    Protocols used: FEVER-A-OH

## 2021-09-18 VITALS
RESPIRATION RATE: 18 BRPM | TEMPERATURE: 98.3 F | BODY MASS INDEX: 37 KG/M2 | DIASTOLIC BLOOD PRESSURE: 75 MMHG | HEART RATE: 88 BPM | OXYGEN SATURATION: 93 % | WEIGHT: 196 LBS | HEIGHT: 61 IN | SYSTOLIC BLOOD PRESSURE: 117 MMHG

## 2021-09-18 LAB
ALBUMIN SERPL-MCNC: 3.2 G/DL (ref 3.4–5)
ALBUMIN UR-MCNC: 30 MG/DL
ALP SERPL-CCNC: 71 U/L (ref 40–150)
ALT SERPL W P-5'-P-CCNC: 24 U/L (ref 0–50)
ANION GAP SERPL CALCULATED.3IONS-SCNC: 3 MMOL/L (ref 3–14)
APPEARANCE UR: CLEAR
AST SERPL W P-5'-P-CCNC: 20 U/L (ref 0–45)
BASOPHILS # BLD AUTO: 0 10E3/UL (ref 0–0.2)
BASOPHILS NFR BLD AUTO: 0 %
BILIRUB SERPL-MCNC: 0.4 MG/DL (ref 0.2–1.3)
BILIRUB UR QL STRIP: NEGATIVE
BUN SERPL-MCNC: 14 MG/DL (ref 7–30)
CALCIUM SERPL-MCNC: 8.6 MG/DL (ref 8.5–10.1)
CHLORIDE BLD-SCNC: 104 MMOL/L (ref 94–109)
CO2 SERPL-SCNC: 28 MMOL/L (ref 20–32)
COLOR UR AUTO: YELLOW
CREAT SERPL-MCNC: 0.96 MG/DL (ref 0.52–1.04)
EOSINOPHIL # BLD AUTO: 0.1 10E3/UL (ref 0–0.7)
EOSINOPHIL NFR BLD AUTO: 1 %
ERYTHROCYTE [DISTWIDTH] IN BLOOD BY AUTOMATED COUNT: 15 % (ref 10–15)
GFR SERPL CREATININE-BSD FRML MDRD: 64 ML/MIN/1.73M2
GLUCOSE BLD-MCNC: 122 MG/DL (ref 70–99)
GLUCOSE UR STRIP-MCNC: NEGATIVE MG/DL
HCT VFR BLD AUTO: 42.4 % (ref 35–47)
HGB BLD-MCNC: 14.4 G/DL (ref 11.7–15.7)
HGB UR QL STRIP: NEGATIVE
HOLD SPECIMEN: NORMAL
HOLD SPECIMEN: NORMAL
IMM GRANULOCYTES # BLD: 0 10E3/UL
IMM GRANULOCYTES NFR BLD: 0 %
KETONES UR STRIP-MCNC: 20 MG/DL
LEUKOCYTE ESTERASE UR QL STRIP: NEGATIVE
LYMPHOCYTES # BLD AUTO: 1.2 10E3/UL (ref 0.8–5.3)
LYMPHOCYTES NFR BLD AUTO: 14 %
MCH RBC QN AUTO: 32.1 PG (ref 26.5–33)
MCHC RBC AUTO-ENTMCNC: 34 G/DL (ref 31.5–36.5)
MCV RBC AUTO: 94 FL (ref 78–100)
MONOCYTES # BLD AUTO: 0.7 10E3/UL (ref 0–1.3)
MONOCYTES NFR BLD AUTO: 8 %
MUCOUS THREADS #/AREA URNS LPF: PRESENT /LPF
NEUTROPHILS # BLD AUTO: 6.8 10E3/UL (ref 1.6–8.3)
NEUTROPHILS NFR BLD AUTO: 77 %
NITRATE UR QL: NEGATIVE
NRBC # BLD AUTO: 0 10E3/UL
NRBC BLD AUTO-RTO: 0 /100
PH UR STRIP: 6.5 [PH] (ref 5–7)
PLATELET # BLD AUTO: 261 10E3/UL (ref 150–450)
POTASSIUM BLD-SCNC: 3.5 MMOL/L (ref 3.4–5.3)
PROT SERPL-MCNC: 8.1 G/DL (ref 6.8–8.8)
RBC # BLD AUTO: 4.49 10E6/UL (ref 3.8–5.2)
RBC URINE: 2 /HPF
SARS-COV-2 RNA RESP QL NAA+PROBE: NEGATIVE
SODIUM SERPL-SCNC: 135 MMOL/L (ref 133–144)
SP GR UR STRIP: 1.03 (ref 1–1.03)
SQUAMOUS EPITHELIAL: 5 /HPF
UROBILINOGEN UR STRIP-MCNC: NORMAL MG/DL
WBC # BLD AUTO: 8.9 10E3/UL (ref 4–11)
WBC URINE: 1 /HPF

## 2021-09-18 PROCEDURE — 250N000013 HC RX MED GY IP 250 OP 250 PS 637: Performed by: EMERGENCY MEDICINE

## 2021-09-18 PROCEDURE — 81001 URINALYSIS AUTO W/SCOPE: CPT | Performed by: EMERGENCY MEDICINE

## 2021-09-18 RX ORDER — CEFPODOXIME PROXETIL 200 MG/1
200 TABLET, FILM COATED ORAL ONCE
Status: COMPLETED | OUTPATIENT
Start: 2021-09-18 | End: 2021-09-18

## 2021-09-18 RX ORDER — AZITHROMYCIN 250 MG/1
TABLET, FILM COATED ORAL
Qty: 4 TABLET | Refills: 0 | Status: SHIPPED | OUTPATIENT
Start: 2021-09-19 | End: 2021-09-23

## 2021-09-18 RX ORDER — CEFPODOXIME PROXETIL 200 MG/1
200 TABLET, FILM COATED ORAL 2 TIMES DAILY
Qty: 14 TABLET | Refills: 0 | Status: SHIPPED | OUTPATIENT
Start: 2021-09-18 | End: 2021-09-25

## 2021-09-18 RX ORDER — ONDANSETRON 4 MG/1
4 TABLET, ORALLY DISINTEGRATING ORAL EVERY 8 HOURS PRN
Qty: 10 TABLET | Refills: 0 | Status: SHIPPED | OUTPATIENT
Start: 2021-09-18 | End: 2021-09-21

## 2021-09-18 RX ORDER — AZITHROMYCIN 250 MG/1
500 TABLET, FILM COATED ORAL ONCE
Status: COMPLETED | OUTPATIENT
Start: 2021-09-18 | End: 2021-09-18

## 2021-09-18 RX ADMIN — AZITHROMYCIN MONOHYDRATE 500 MG: 250 TABLET ORAL at 01:42

## 2021-09-18 RX ADMIN — CEFPODOXIME PROXETIL 200 MG: 200 TABLET, FILM COATED ORAL at 01:42

## 2021-09-18 NOTE — DISCHARGE INSTRUCTIONS
Take next dose of cefpodoxime on Saturday night  Next dose of azithromycin on Sunday morning  Zofran for nausea  Blood cultures are in process- you would receive a call if positive  Follow up with primary care provider  Should have recheck chest xray in one month

## 2021-09-18 NOTE — ED PROVIDER NOTES
History   Chief Complaint:  Suspected Covid (Fever, cough, SOB and bodyaches for the last few days. 2 recent neg. COVID tests, awaiting PCR result now.)       HPI   John Steven is a 61 year old female with history of Ulcerative colitis, vertigo, and asthma who presents with fever, cough, and body aches. The patient states that she began having chills 4 days ago, and 3 days ago developed body aches, headache, cough, congestion, and nausea. The patient was in [place] but denies any known sick contacts. Denies vomiting, diarrhea, chest pain, shortness of breath, and urinary symptoms. The patient got the J&J COVID-19 vaccine in March and the first dose of the Moderna COVID-19 vaccine on 21.       Review of Systems   Constitutional: Positive for chills and fever.   HENT: Positive for congestion and rhinorrhea.    Respiratory: Positive for cough. Negative for shortness of breath.    Cardiovascular: Negative for chest pain.   Gastrointestinal: Positive for nausea. Negative for diarrhea and vomiting.   Genitourinary: Negative for difficulty urinating, dysuria, frequency, hematuria and urgency.   Musculoskeletal: Positive for myalgias.   Neurological: Positive for headaches.   All other systems reviewed and are negative.      Allergies:  Alcohol  Fentanyl  Food  Morphine  Penicillins  Shellfish Allergy    Medications:  Xanax  Librax  Flexeril  Pristiq  Flovent  Levsin  Metrogel  Estrace  Xyzal  Methotrexate  Nifedical  Psyllium  Remicade    Past Medical History:    Esophageal spasm  Immunocompromised  Macrocytosis  Obesity  Urethritis  PONV  Ulcerative colitis  Asthma  Vertigo     Past Surgical History:    Abdominal surgery  Biopsy, breast  D&C   x3  Hysterectomy  Heller myotomy  EB for spotting, fibroid     Family History:    Mother: cerebrovascular disease, diabetes, breast cancer, merkel cell disease  Father: obesity  Sister: diabetes, obesity, hypertension,  Brother: collagenous colitis, diabetes,  "obesity  Son: asthma    Social History:  +in ED alone  Recently traveled to [place]    Physical Exam     Patient Vitals for the past 24 hrs:   BP Temp Temp src Pulse Resp SpO2 Height Weight   09/18/21 0030 111/52 -- -- 85 -- 96 % -- --   09/18/21 0000 124/61 -- -- 89 -- 96 % -- --   09/17/21 2245 129/68 -- -- -- -- -- -- --   09/17/21 2243 -- -- -- -- -- 94 % -- --   09/17/21 2241 129/68 98.3  F (36.8  C) Oral 102 18 93 % 1.549 m (5' 1\") 88.9 kg (196 lb)       Physical Exam    General: Sitting up in bed  Eyes:  The pupils are equal and round    Conjunctivae and sclerae are normal  ENT:    Wearing a mask  Neck:  Normal range of motion  CV:  Tachycardic rate, regular rhythm    Skin warm and well perfused   Resp:  Non labored breathing on room air    No tachypnea    Dry cough heard    Lungs clear bilaterally  GI:  Abdomen is soft, there is no rigidity    No distension    No rebound tenderness     No abdominal tenderness  MS:  Normal muscular tone  Skin:  No rash or acute skin lesions noted  Neuro:   Awake, alert.      Speech is normal and fluent.    Face is symmetric.     Moves all extremities equally  Psych: Normal affect.  Appropriate interactions.    Emergency Department Course     Imaging:  XR Chest Port 1 View  1. A few ill-defined patchy opacities are present in the central aspect of the mid-right lung. These are nonspecific, but could relate to pneumonia. A follow-up chest radiograph would be useful in one month for reevaluation.  2. No other finding suspicious for active cardiopulmonary disease.    As per Radiology    Laboratory:  UA with microscopic: Ketones: 20 (A), Protein Albumin: 30 (A), Mucus: present (A), Squamous Epithelials: 5 (H) o/w WNL    Symptomatic COVID-19 virus PCR: Negative    CMP: Glucose: 122 (H), Albumin: 3.2 (L) o/w WNL (Creatinine 0.96)     CBC: WBC 8.9, HGB 14.4,     Blood Culture Peripheral Blood: SENT    Emergency Department Course:    Reviewed:  I reviewed nursing notes, " vitals, past medical history and care everywhere    Assessments:  2306 I obtained history and examined the patient as noted above.    0100 I rechecked the patient and explained findings. We discussed discharge and the patient is comfortable returning home.    Interventions:  2347 0.9% sodium chloride 1000 mL    2347 Zofran 4 mg IV    0142 Vantin 200 mg PO    0142 Zithromax 500 mg PO    Disposition:  The patient was discharged to home.       Impression & Plan     Medical Decision Making:  John Steven is a 61 year old female who presents for evaluation of multiple symptoms including cough, fever, myalgias, etc.  History, physical exam and imaging studies are consistent with pneumonia.  Chest xray showed right sided infiltrate and given symptoms, consistent with pneumonia. Will treat as bacterial pneumonia. Covid test was negative and had two negative rapid tests earlier this week so this seems less likely.  There are no signs of complications of pneumonia at this point such as septic shock, bacteremia, empyema, hypoxia, respiratory failure or compromise.  Supportive outpatient management is therefore indicated. This seems to be community acquired pneumonia and there are no risk factors at this point for coverage of antibiotic-resistant strains.  I doubt PE, dissection, acute coronary syndrome, or other worrisome etiology for patients symptoms. Doubt meningitis. No abdominal tenderness to suggest intra-abdominal infection. No evidence of UTI. Blood cultures are in process and discussed that if positive, she would receive a call. Reasons to return to ED discussed with patient.    Covid-19  John Steven was evaluated during a global COVID-19 pandemic, which necessitated consideration that the patient might be at risk for infection with the SARS-CoV-2 virus that causes COVID-19.   Applicable protocols for evaluation were followed during the patient's care.   COVID-19 was considered as part of the patient's evaluation.  The plan for testing is:  a test was obtained during this visit.    Diagnosis:    ICD-10-CM    1. Pneumonia of right lung due to infectious organism, unspecified part of lung  J18.9        Discharge Medications:  New Prescriptions    AZITHROMYCIN (ZITHROMAX Z-JOSAFAT) 250 MG TABLET    one tablet daily for the next 4 days    CEFPODOXIME (VANTIN) 200 MG TABLET    Take 1 tablet (200 mg) by mouth 2 times daily for 7 days    ONDANSETRON (ZOFRAN ODT) 4 MG ODT TAB    Take 1 tablet (4 mg) by mouth every 8 hours as needed     Scribe Disclosure:  I, Brian Wheeler, am serving as a scribe at 10:50 PM on 9/17/2021 to document services personally performed by Laura Rodriguez MD based on my observations and the provider's statements to me.        Laura Rodriguez MD  09/18/21 0249

## 2021-09-18 NOTE — ED TRIAGE NOTES
Fever, cough, SOB and bodyaches for the last few days. 2 recent neg. COVID tests, awaiting PCR result now.

## 2021-09-23 LAB — BACTERIA BLD CULT: NO GROWTH

## 2021-09-24 ENCOUNTER — IMMUNIZATION (OUTPATIENT)
Dept: NURSING | Facility: CLINIC | Age: 61
End: 2021-09-24
Attending: INTERNAL MEDICINE
Payer: COMMERCIAL

## 2021-09-24 DIAGNOSIS — Z23 HIGH PRIORITY FOR 2019-NCOV VACCINE: ICD-10-CM

## 2021-09-24 PROCEDURE — 0012A COVID-19,PF,MODERNA (18+ YRS): CPT

## 2021-09-24 PROCEDURE — 91301 COVID-19,PF,MODERNA (18+ YRS): CPT

## 2021-09-24 PROCEDURE — 99207 PR NO CHARGE NURSE ONLY: CPT

## 2021-09-30 ENCOUNTER — MYC MEDICAL ADVICE (OUTPATIENT)
Dept: FAMILY MEDICINE | Facility: CLINIC | Age: 61
End: 2021-09-30

## 2021-10-13 DIAGNOSIS — F41.8 SITUATIONAL ANXIETY: ICD-10-CM

## 2021-10-13 RX ORDER — DESVENLAFAXINE 25 MG/1
TABLET, EXTENDED RELEASE ORAL
Qty: 90 TABLET | Refills: 0 | Status: SHIPPED | OUTPATIENT
Start: 2021-10-13 | End: 2021-11-12

## 2021-10-14 ENCOUNTER — TRANSFERRED RECORDS (OUTPATIENT)
Dept: HEALTH INFORMATION MANAGEMENT | Facility: CLINIC | Age: 61
End: 2021-10-14
Payer: COMMERCIAL

## 2021-11-11 NOTE — PROGRESS NOTES
Assessment & Plan     Pneumonia of right lung due to infectious organism, unspecified part of lung  Chest x-ray by my review shows resolved pneumonia and she is doing quite well  She does have postnasal drip which will be treated as below  - XR Chest 2 Views  - CBC with platelets  - CBC with platelets    Mild persistent asthma without complication  We will use Flovent during change of seasons and travels    Ulcerative colitis with complication, unspecified location (H)  *In remission but immune compromised    Immunocompromised (H)  On methotrexate and Remicade and up-to-date on all immunizations and will use a Pneumovax next year    Situational anxiety  Pristiq is working well  - desvenlafaxine succinate (PRISTIQ) 25 MG 24 hr tablet  Dispense: 90 tablet; Refill: 3    Congestion of paranasal sinus  Patient will use saline nasal spray with travel  - fluticasone (FLONASE) 50 MCG/ACT nasal spray  Dispense: 16 g; Refill: 1            Return in about 1 year (around 11/12/2022) for Physical Exam.    Rianna Sim MD  [facility] Lopez is a 61 year old who presents for the following health issues     HPI     Follow up for Pneumonia of right lung due to infectious organism, unspecified part of lung - seen at [facility] Emergency Dept on 9/17/2021     Patient had just come back from [place] trip where she had to wait on the airport for 12 hours  This was Labor Day weekend and after that she started to have cough and shortness of breath  She is not feeling well and finished the antibiotics  She says she had pneumonia in the past but this was really worse  Now she still has postnasal drip and her chronic cough but otherwise doing well  10 point ROS of systems including Constitutional, Eyes, Respiratory, Cardiovascular, Gastroenterology, Genitourinary, Integumentary, Muscularskeletal, Psychiatric were all negative except for pertinent positives noted in my HPI.      Review  of Systems   10 point ROS of systems including Constitutional, Eyes, Respiratory, Cardiovascular, Gastroenterology, Genitourinary, Integumentary, Muscularskeletal, Psychiatric were all negative except for pertinent positives noted in my HPI.        Objective    /71 (BP Location: Left arm, Patient Position: Sitting, Cuff Size: Adult Large)   Pulse 91   Temp 97.3  F (36.3  C) (Temporal)   Resp 16   Wt 89.4 kg (197 lb 1.6 oz)   SpO2 97%   Breastfeeding No   BMI 37.24 kg/m    Body mass index is 37.24 kg/m .  Physical Exam   GENERAL: healthy, alert and no distress  HENT: ear canals and TM's normal, nose and mouth without ulcers or lesions, nasal mucosa edematous , oropharynx clear and oral mucous membranes moist  NECK: no adenopathy, no asymmetry, masses, or scars and thyroid normal to palpation  RESP: lungs clear to auscultation - no rales, rhonchi or wheezes  CV: regular rate and rhythm, normal S1 S2, no S3 or S4, no murmur, click or rub, no peripheral edema and peripheral pulses strong  ABDOMEN: soft, nontender, no hepatosplenomegaly, no masses and bowel sounds normal  MS: no gross musculoskeletal defects noted, no edema    Patient Active Problem List   Diagnosis     Other specified disorder of rectum and anus     Ulcerative colitis (H)     Other urethritis(597.89)     Other and unspecified hormones and synthetic substitutes causing adverse effect in therapeutic use     Allergic rhinitis     Disorder of esophagus     Rosacea     Dizziness and giddiness     Obesity     Immunocompromised (H)     Post-operative state     Current Outpatient Medications   Medication     acyclovir (ZOVIRAX) 5 % cream     ALPRAZolam (XANAX) 0.25 MG tablet     clidinium-chlordiazePOXIDE (LIBRAX) 5-2.5 MG capsule     cyclobenzaprine (FLEXERIL) 10 MG tablet     desvenlafaxine succinate (PRISTIQ) 25 MG 24 hr tablet     diphenhydrAMINE (BENADRYL) 25 MG capsule     estradiol (ESTRACE) 2 MG tablet     fluticasone (FLONASE) 50 MCG/ACT  nasal spray     fluticasone (FLOVENT HFA) 220 MCG/ACT inhaler     folic acid (FOLVITE) 1 MG tablet     levocetirizine (XYZAL) 5 MG tablet     LEVSIN/SL 0.125 MG SL SUBL     Magnesium 500 MG CAPS     methotrexate 2.5 MG tablet CHEMO     metroNIDAZOLE (METROGEL) 1 % gel     NIFEdipine ER OSMOTIC (NIFEDICAL XL) 60 MG 24 hr tablet     order for DME     order for DME     order for DME     order for DME     PROAIR RESPICLICK 108 (90 Base) MCG/ACT inhaler     Probiotic Product (PROBIOTIC DAILY PO)     psyllium 0.52 G capsule     REMICADE 100 MG injection     Vitamin D, Cholecalciferol, 1000 units TABS     No current facility-administered medications for this visit.         Labs are reported to the patient

## 2021-11-12 ENCOUNTER — OFFICE VISIT (OUTPATIENT)
Dept: FAMILY MEDICINE | Facility: CLINIC | Age: 61
End: 2021-11-12
Payer: COMMERCIAL

## 2021-11-12 ENCOUNTER — ANCILLARY PROCEDURE (OUTPATIENT)
Dept: GENERAL RADIOLOGY | Facility: CLINIC | Age: 61
End: 2021-11-12
Attending: INTERNAL MEDICINE
Payer: COMMERCIAL

## 2021-11-12 VITALS
DIASTOLIC BLOOD PRESSURE: 71 MMHG | WEIGHT: 197.1 LBS | RESPIRATION RATE: 16 BRPM | BODY MASS INDEX: 37.24 KG/M2 | HEART RATE: 91 BPM | SYSTOLIC BLOOD PRESSURE: 104 MMHG | OXYGEN SATURATION: 97 % | TEMPERATURE: 97.3 F

## 2021-11-12 DIAGNOSIS — J18.9 PNEUMONIA OF RIGHT LUNG DUE TO INFECTIOUS ORGANISM, UNSPECIFIED PART OF LUNG: ICD-10-CM

## 2021-11-12 DIAGNOSIS — K51.919 ULCERATIVE COLITIS WITH COMPLICATION, UNSPECIFIED LOCATION (H): ICD-10-CM

## 2021-11-12 DIAGNOSIS — J45.30 MILD PERSISTENT ASTHMA WITHOUT COMPLICATION: ICD-10-CM

## 2021-11-12 DIAGNOSIS — D84.9 IMMUNOCOMPROMISED (H): ICD-10-CM

## 2021-11-12 DIAGNOSIS — F41.8 SITUATIONAL ANXIETY: ICD-10-CM

## 2021-11-12 DIAGNOSIS — J18.9 PNEUMONIA OF RIGHT LUNG DUE TO INFECTIOUS ORGANISM, UNSPECIFIED PART OF LUNG: Primary | ICD-10-CM

## 2021-11-12 DIAGNOSIS — R09.81 CONGESTION OF PARANASAL SINUS: ICD-10-CM

## 2021-11-12 LAB
ERYTHROCYTE [DISTWIDTH] IN BLOOD BY AUTOMATED COUNT: 15.4 % (ref 10–15)
HCT VFR BLD AUTO: 44.2 % (ref 35–47)
HGB BLD-MCNC: 14.8 G/DL (ref 11.7–15.7)
MCH RBC QN AUTO: 33.9 PG (ref 26.5–33)
MCHC RBC AUTO-ENTMCNC: 33.5 G/DL (ref 31.5–36.5)
MCV RBC AUTO: 101 FL (ref 78–100)
PLATELET # BLD AUTO: 276 10E3/UL (ref 150–450)
RBC # BLD AUTO: 4.36 10E6/UL (ref 3.8–5.2)
WBC # BLD AUTO: 8.8 10E3/UL (ref 4–11)

## 2021-11-12 PROCEDURE — 99214 OFFICE O/P EST MOD 30 MIN: CPT | Performed by: INTERNAL MEDICINE

## 2021-11-12 PROCEDURE — 71046 X-RAY EXAM CHEST 2 VIEWS: CPT | Performed by: RADIOLOGY

## 2021-11-12 PROCEDURE — 36415 COLL VENOUS BLD VENIPUNCTURE: CPT | Performed by: INTERNAL MEDICINE

## 2021-11-12 PROCEDURE — 85027 COMPLETE CBC AUTOMATED: CPT | Performed by: INTERNAL MEDICINE

## 2021-11-12 RX ORDER — FLUTICASONE PROPIONATE 50 MCG
1 SPRAY, SUSPENSION (ML) NASAL DAILY
Qty: 16 G | Refills: 1 | Status: SHIPPED | OUTPATIENT
Start: 2021-11-12

## 2021-11-12 RX ORDER — DESVENLAFAXINE 25 MG/1
25 TABLET, EXTENDED RELEASE ORAL DAILY
Qty: 90 TABLET | Refills: 3 | Status: SHIPPED | OUTPATIENT
Start: 2021-11-12 | End: 2022-09-30

## 2022-01-07 ENCOUNTER — TRANSFERRED RECORDS (OUTPATIENT)
Dept: HEALTH INFORMATION MANAGEMENT | Facility: CLINIC | Age: 62
End: 2022-01-07
Payer: COMMERCIAL

## 2022-01-28 ENCOUNTER — TRANSFERRED RECORDS (OUTPATIENT)
Dept: HEALTH INFORMATION MANAGEMENT | Facility: CLINIC | Age: 62
End: 2022-01-28
Payer: COMMERCIAL

## 2022-01-28 LAB
CREATININE (EXTERNAL): 0.91 MG/DL (ref 0.57–1)
GFR ESTIMATED (EXTERNAL): 68 ML/MIN/1.73
GFR ESTIMATED (IF AFRICAN AMERICAN) (EXTERNAL): 79 ML/MIN/1.73

## 2022-03-25 ENCOUNTER — TRANSFERRED RECORDS (OUTPATIENT)
Dept: HEALTH INFORMATION MANAGEMENT | Facility: CLINIC | Age: 62
End: 2022-03-25
Payer: COMMERCIAL

## 2022-03-25 LAB
ALT SERPL-CCNC: 28 IU/L (ref 0–32)
AST SERPL-CCNC: 36 IU/L (ref 0–40)
CREATININE (EXTERNAL): 0.89 MG/DL (ref 0.57–1)
GFR ESTIMATED (EXTERNAL): 74 ML/MIN/1.73

## 2022-06-09 ENCOUNTER — TRANSFERRED RECORDS (OUTPATIENT)
Dept: HEALTH INFORMATION MANAGEMENT | Facility: CLINIC | Age: 62
End: 2022-06-09
Payer: COMMERCIAL

## 2022-06-09 LAB
ALT SERPL-CCNC: 24 IU/L (ref 0–32)
AST SERPL-CCNC: 29 IU/L (ref 0–40)
CREATININE (EXTERNAL): 0.74 MG/DL (ref 0.57–1)
GFR ESTIMATED (EXTERNAL): 92 ML/MIN/1.73

## 2022-06-14 ENCOUNTER — MYC MEDICAL ADVICE (OUTPATIENT)
Dept: FAMILY MEDICINE | Facility: CLINIC | Age: 62
End: 2022-06-14

## 2022-06-15 NOTE — TELEPHONE ENCOUNTER
Appointments in Next Year    Mar 02, 2023 10:30 AM  (Arrive by 10:10 AM)  Adult Preventative Visit with Rianna Sim MD  [facility] ([facility] [place] ) [phone]        PCP see below - pt unable to get physical with you until 3/2023     Asking if you are able to work her in sooner for physical?     Please advise     Frida Olson RN  [facility]

## 2022-06-24 ENCOUNTER — VIRTUAL VISIT (OUTPATIENT)
Dept: FAMILY MEDICINE | Facility: CLINIC | Age: 62
End: 2022-06-24
Payer: COMMERCIAL

## 2022-06-24 DIAGNOSIS — U07.1 INFECTION DUE TO 2019 NOVEL CORONAVIRUS: Primary | ICD-10-CM

## 2022-06-24 PROCEDURE — 99214 OFFICE O/P EST MOD 30 MIN: CPT | Mod: CS | Performed by: NURSE PRACTITIONER

## 2022-06-24 NOTE — PROGRESS NOTES
John is a 62 year old who is being evaluated via a billable video visit.      How would you like to obtain your AVS? MyChart  If the video visit is dropped, the invitation should be resent by: Text to cell phone: [phone]  Will anyone else be joining your video visit? No      Assessment & Plan     John was seen today for covid concern.    Diagnoses and all orders for this visit:    Infection due to 2019 novel coronavirus  -     nirmatrelvir and ritonavir (PAXLOVID) therapy pack; Take 3 tablets by mouth 2 times daily for 5 days Take 2 Nirmatrelvir tablets and 1 Ritonavir tablet twice daily for 5 days.  MASSBP Score 6/24/2022   Age Greater than or equal to 65 years 0   BMI greater than or equal to 35 kg/m2 2   Has Diabetes Mellitus 0   Has Chronic Kidney Disease 0   Has Cardiovascular Disease and 55 years or older 0   Has Chronic Respiratory Disease and 55 years or older 0   Has Hypertension and 55 years or older 0   Is Immunocompromised 4   Is Pregnant 0   Member of BIP community (Black/, /, ,  or , or  or [place] Native)  0   MASSBP Score 6   Has the patient had a positive COVID test outside our system?  Yes   What day did symptoms start?  6/23/2022     Patient with acute onset of symptoms late day 6/22/22 with noted improvement of symptoms this morning.  Patient education completed regarding viral cause, typical course, symptomatic treatment, risks vs. Benefits of antiviral treatments and options for proceeding with Paxlovid.  Patient would like to monitor symptoms for 1-2 more days and ensure continued improvement - Paxlovid was sent to pharmacy and patient may consider to fill over the weekend with any noted worsening of symptoms.  If Paxlovid is initiated reviewed that she would need to hold Xanax and Flovent during 5 day period of taking medication (which is doable due to as needed nature of medications).        30 minutes  "spent on the date of the encounter doing chart review, history and exam, documentation and further activities per the note       BMI:   Estimated body mass index is 37.24 kg/m  as calculated from the following:    Height as of 9/17/21: 1.549 m (5' 1\").    Weight as of 11/12/21: 89.4 kg (197 lb 1.6 oz).     Return in about 1 week (around 7/1/2022) for No improvement or sooner with worsening symptoms.    Ita Carrillo, ALFREDO [facility] KIKE Lopez is a 62 year old, presenting for the following health issues:  Covid Concern      HPI       COVID-19 Symptom Review    Patient reports that she came home from work with exhaustion and decreased appetite.  Woke up yesterday morning with headache, body aches, chills and redness of face/neck.    Took an at home COVID-19 test which was positive and did a subsequent PCR test (doesn't have results).  Took Ibuprofen yesterday - slept for 3 hours and slept well last night.  Woke up feeling much better today.    Today has an upset stomach.        How many days ago did these symptoms start? 2 days    Are any of the following symptoms significant for you?    New or worsening difficulty breathing? No    Worsening cough? Always has a cough    Fever or chills? Yesterday not today    Headache: yesterday not today    Sore throat: no    Chest pain: no    Diarrhea: no    Body aches? YES    What treatments has patient tried? advil   Does patient live in a nursing home, group home, or shelter? no  Does patient have a way to get food/medications during quarantined? Yes, I have a friend or family member who can help me.      Review of Systems   Constitutional, HEENT, cardiovascular, pulmonary, gi and gu systems are negative, except as otherwise noted.      Objective           Vitals:  No vitals were obtained today due to virtual visit.    Physical Exam \  GENERAL: Healthy, alert and no distress  EYES: Eyes grossly normal to inspection.  No discharge " "or erythema, or obvious scleral/conjunctival abnormalities.  RESP: No audible wheeze, cough, or visible cyanosis.  No visible retractions or increased work of breathing.    SKIN: Visible skin clear. No significant rash, abnormal pigmentation or lesions.  NEURO: Cranial nerves grossly intact.  Mentation and speech appropriate for age.  PSYCH: Mentation appears normal, affect normal/bright, judgement and insight intact, normal speech and appearance well-groomed.          Video-Visit Details    Video Start Time: 2:34 PM    Type of service:  Video Visit    Video End Time:2:54 PM    Originating Location (pt. Location): Home    Distant Location (provider location):  [facility]     Platform used for Video Visit: [employer]    .          MASSBP Score 6/24/2022   Age Greater than or equal to 65 years 0   BMI greater than or equal to 35 kg/m2 2   Has Diabetes Mellitus 0   Has Chronic Kidney Disease 0   Has Cardiovascular Disease and 55 years or older 0   Has Chronic Respiratory Disease and 55 years or older 0   Has Hypertension and 55 years or older 0   Is Immunocompromised 4   Is Pregnant 0   Member of BIPOC community (Black/, /, ,  or , or  or [place] Native)  0   MASSBP Score 6   Has the patient had a positive COVID test outside our system?  Yes   What day did symptoms start?  6/23/2022       Estimated body mass index is 37.24 kg/m  as calculated from the following:    Height as of 9/17/21: 1.549 m (5' 1\").    Weight as of 11/12/21: 89.4 kg (197 lb 1.6 oz).     GFR Estimate   Date Value Ref Range Status   09/17/2021 64 >60 mL/min/1.73m2 Final     Comment:     As of July 11, 2021, eGFR is calculated by the CKD-EPI creatinine equation, without race adjustment. eGFR can be influenced by muscle mass, exercise, and diet. The reported eGFR is an estimation only and is only applicable if the renal function is stable.   01/08/2020 " >60 >60 ml/min/1.73m2 Final       ..

## 2022-06-24 NOTE — PATIENT INSTRUCTIONS
If you start PAXLOVID - HOLD Xanax and Flovent during 5 days of taking medication.            Instructions for Patients        What are the symptoms of COVID-19?  Symptoms can include fever, cough, shortness of breath, chills, headache, muscle pain sore throat, fatigue, runny or stuffy nose, and loss of taste and smell. Other less common symptoms include nausea, vomiting, or diarrhea (watery stools).    Know when to call 911. Emergency warning signs include:  Trouble breathing or shortness of breath  Pain or pressure in the chest that doesn't go away  Feeling confused like you haven't felt before, or not being able to wake up  Bluish-colored lips or face    How can I take care of myself?  Get lots of rest. Drink extra fluids (unless a doctor has told you not to).  Take Tylenol (acetaminophen) for fever or pain. If you have liver or kidney problems, ask your family doctor if it's okay to take Tylenol   Adults:   650 mg (two 325 mg pills or tablets) every 4 to 6 hours, or...   1,000 mg (two 500 mg pills or tablets) every 8 hours as needed.  Note: Don't take more than 3,000 mg in one day. Acetaminophen is found in many medicines (both prescribed and over the counter). Read all labels to be sure you don't take too much.  For children, check the Tylenol bottle for the right dose. The dose is based on the child's age or weight.  Take over the counter medicines for your symptoms as needed. Talk to your pharmacist.  If you have other health problems (like cancer, heart failure, an organ transplant, or severe kidney disease): Call your specialty clinic if you don't feel better in the next 2 days.    These guidelines are for isolating and quarantining before returning to work, school or .   For employers, schools and day cares: This is an official notice for this person s medical guidelines for returning in-person.   For health care sites: The CDC gives different isolation and quarantine guidelines for healthcare  sites, please check with these sites before arriving.     How do I self-isolate?  You isolate when you have symptoms of COVID or a test shows you have COVID, even if you don t have symptoms.   If you DO have symptoms:  Stay home and away from others  For at least 5 days after your symptoms started, AND   You are fever free for 24 hours (with no medicine that reduces fever), AND  Your other symptoms are better.  Wear a mask for 10 full days any time you are around others.  If you DON T have symptoms:  Stay at home and away from others for at least 5 days after your positive test.  Wear a mask for 10 full days any time you are around others.    How and when do I quarantine?  You quarantine when you may have been exposed to the virus and DON T have symptoms.   Stay home and away from others.   You must quarantine for 5 days after your last contact with a person who has COVID.  Note: If you are fully vaccinated, you don t need to quarantine. You should still follow the steps below.   Wear a mask for 10 full days any time you re around others.  Get tested at least 5 days after you were exposed, even if you don t have symptoms.   If you start to have symptoms, isolate right away and get tested.    Where can I get more information?  [facility] COVID-19 Resource Hub: [email or web address]   CDC Quarantine & Isolation: https://www.cdc.gov/coronavirus/2019-ncov/your-health/quarantine-isolation.html   CDC - What to Do If You're Sick: https://www.cdc.gov/coronavirus/2019-ncov/if-you-are-sick/index.html  [facility] clinical trials (COVID-19 research studies): clinicalaffairs.[facility]/umn-clinical-trials  [place] Department of Health COVID-19 Public Hotline: 1-991.929.3380  Coping with Life After COVID-19  Being in the hospital because of COVID-19 is scary. Going home can be scary, too. You may face changes to your life, the way you work or what you can eat. It s hard to adjust to change, and it s  normal to feel afraid, frustrated or even angry. These feelings usually go away over time. If your feelings don t start to get better, it s called  adjustment disorder.      What signs should I look out for?  Adjustment disorder can happen to anyone in a time of stress. It makes it hard to cope with daily life. You may feel lonely or fight with loved ones, even if you re glad to be home. Watch for these signs:  Fear or worry  Hard time focusing  Sadness or anger  Trouble talking to family or friends  Feeling like you don t fit in or isolating yourself  Problems with sleep   Drinking alcohol or taking drugs to cope    What can I do?  You can help yourself get better. Feeling you have control helps you move forward. You may wonder if you ll be able to do things you did before. Be patient. Do your best to make the most of every day. Try to build relationships, be as active as you can, eat right and keep a sense of humor. Avoid smoking and drinking too much alcohol. Call your family doctor or clinic if you re not sure what to do. They can guide you to care or other services.    When should I get help?  Think about getting counseling if your sadness or frustration gets worse. Together with a trained counselor, you can talk about your problems adjusting to life after your hospital stay. You can come up with new ways to handle changes that give you more control. Your family doctor or care team can help you find a counselor.     Get help if you re thinking about hurting yourself. If you need help right away, call 911 or go to the nearest emergency room. You can also try the Crisis Text Line.    Crisis Text Line: 923-269 (http://www.crisistextline.org)  The Crisis Text Line serves anyone, in any crisis. It gives free, 24/7 support. Here's how it works:  Text HOME to 996278 from anywhere in the [place], anytime, about any type of crisis.  A live, trained Crisis Counselor will text you back quickly.  The volunteer Crisis Counselor  can help you move from a  hot moment  to a  cool moment.  They can also help you work out a safety plan.

## 2022-07-05 ENCOUNTER — TRANSFERRED RECORDS (OUTPATIENT)
Dept: HEALTH INFORMATION MANAGEMENT | Facility: CLINIC | Age: 62
End: 2022-07-05

## 2022-08-04 ENCOUNTER — TRANSFERRED RECORDS (OUTPATIENT)
Dept: HEALTH INFORMATION MANAGEMENT | Facility: CLINIC | Age: 62
End: 2022-08-04

## 2022-08-04 LAB
ALT SERPL-CCNC: 15 IU/L (ref 0–32)
AST SERPL-CCNC: 23 IU/L (ref 0–40)
CREATININE (EXTERNAL): 0.88 MG/DL (ref 0.57–1)
GFR ESTIMATED (EXTERNAL): 74 ML/MIN/1.73

## 2022-09-29 ENCOUNTER — TRANSFERRED RECORDS (OUTPATIENT)
Dept: HEALTH INFORMATION MANAGEMENT | Facility: CLINIC | Age: 62
End: 2022-09-29

## 2022-09-29 LAB
ALT SERPL-CCNC: 20 IU/L (ref 0–32)
AST SERPL-CCNC: 22 IU/L (ref 0–40)
CREATININE (EXTERNAL): 0.86 MG/DL (ref 0.57–1)
GFR ESTIMATED (EXTERNAL): 76 ML/MIN/1.73

## 2022-09-29 ASSESSMENT — ENCOUNTER SYMPTOMS
HEMATURIA: 0
HEADACHES: 0
COUGH: 0
DIZZINESS: 0
EYE PAIN: 0
CHILLS: 0
SHORTNESS OF BREATH: 0
NAUSEA: 0
PARESTHESIAS: 0
WEAKNESS: 0
SORE THROAT: 0
ARTHRALGIAS: 0
HEARTBURN: 0
NERVOUS/ANXIOUS: 0
DYSURIA: 0
MYALGIAS: 0
PALPITATIONS: 0
FEVER: 0
DIARRHEA: 0
CONSTIPATION: 0
ABDOMINAL PAIN: 0
BREAST MASS: 0
HEMATOCHEZIA: 0
FREQUENCY: 0
JOINT SWELLING: 0

## 2022-09-29 NOTE — PROGRESS NOTES
SUBJECTIVE:   CC: John is an 62 year old who presents for preventive health visit.   This is a very pleasant 62 years old woman who has history of ulcerative colitis and is on methotrexate and Remicade and is immune compromised  She did have COVID in  but did not have complications  She also has asthma  She has taken flu shot and COVID booster  She tries to exercise and has no symptoms    Patient has been advised of split billing requirements and indicates understanding: Yes  Healthy Habits:     Getting at least 3 servings of Calcium per day:  Yes    Bi-annual eye exam:  Yes    Dental care twice a year:  Yes    Sleep apnea or symptoms of sleep apnea:  None    Diet:  Regular (no restrictions)    Frequency of exercise:  2-3 days/week    Duration of exercise:  30-45 minutes    Taking medications regularly:  Yes    Medication side effects:  None    PHQ-2 Total Score: 0    Additional concerns today:  No            Today's PHQ-2 Score:   PHQ-2 (  [employer]) 2022   Q1: Little interest or pleasure in doing things 0   Q2: Feeling down, depressed or hopeless 0   PHQ-2 Score 0   PHQ-2 Total Score (12-17 Years)- Positive if 3 or more points; Administer PHQ-A if positive -   Q1: Little interest or pleasure in doing things Not at all   Q2: Feeling down, depressed or hopeless Not at all   PHQ-2 Score 0       Abuse: Current or Past (Physical, Sexual or Emotional) - No  Do you feel safe in your environment? Yes        Social History     Tobacco Use     Smoking status: Former Smoker     Packs/day: 1.00     Years: 10.00     Pack years: 10.00     Types: Cigarettes     Quit date: 1989     Years since quittin.7     Smokeless tobacco: Never Used   Substance Use Topics     Alcohol use: Yes     Alcohol/week: 0.0 standard drinks     Comment: 2-3 drinks  times a week     If you drink alcohol do you typically have >3 drinks per day or >7 drinks per week? No    Alcohol Use 2022   Prescreen: >3 drinks/day or >7  drinks/week? -   Prescreen: >3 drinks/day or >7 drinks/week? No       Reviewed orders with patient.  Reviewed health maintenance and updated orders accordingly - Yes  BP Readings from Last 3 Encounters:   22 95/61   21 104/71   21 117/75    Wt Readings from Last 3 Encounters:   22 86.3 kg (190 lb 4.8 oz)   21 89.4 kg (197 lb 1.6 oz)   21 88.9 kg (196 lb)                  Patient Active Problem List   Diagnosis     Other specified disorder of rectum and anus     Ulcerative colitis (H)     Other urethritis(597.89)     Other and unspecified hormones and synthetic substitutes causing adverse effect in therapeutic use     Allergic rhinitis     Disorder of esophagus     Rosacea     Dizziness and giddiness     Obesity     Immunocompromised (H)     Post-operative state     Past Surgical History:   Procedure Laterality Date     ABDOMEN SURGERY       BIOPSY      BREAST     DILATION AND CURETTAGE, HYSTEROSCOPY DIAGNOSTIC, COMBINED N/A 2015    Procedure: COMBINED DILATION AND CURETTAGE, HYSTEROSCOPY DIAGNOSTIC;  Surgeon: Chelle Rebollar MD;  Location: [facility]     DILATION AND CURETTAGE, OPERATIVE HYSTEROSCOPY WITH MORCELLATOR, COMBINED N/A 2019    Procedure: HYSTEROSCOPY, WITH DILATION AND CURETTAGE OF UTERUS USING MORCELLATOR (MYOSURE);  Surgeon: Chelle Rebollar MD;  Location:  OR     GI SURGERY       GYN SURGERY       x3     LAPAROSCOPIC HYSTERECTOMY TOTAL, BILATERAL SALPINGO-OOPHORECTOMY, COMBINED N/A 2019    Procedure: TOTAL LAPAROSCOPIC HYSTERECTOMY, BILATERAL SALPINGO OOPHORECTOMY, LYSIS OF ADHESIONS, CYSTOCOPY, RIGID PROCTO, laparoscopic repair seromyotomy;  Surgeon: Chelle Rebollar MD;  Location:  OR     [facility] NONSPECIFIC PROCEDURE      Heller myotomy     Z NONSPECIFIC PROCEDURE      EB for spotting,fibroid     ZZC NONSPECIFIC PROCEDURE      rt breast biopsy     [facility] COLONOSCOPY THRU STOMA, DIAGNOSTIC         Social History      Tobacco Use     Smoking status: Former Smoker     Packs/day: 1.00     Years: 10.00     Pack years: 10.00     Types: Cigarettes     Quit date: 1989     Years since quittin.7     Smokeless tobacco: Never Used   Substance Use Topics     Alcohol use: Yes     Alcohol/week: 0.0 standard drinks     Comment: 2-3 drinks  times a week     Family History   Problem Relation Age of Onset     Cerebrovascular Disease Mother         80     Diabetes Mother      Breast Cancer Mother         60     Blood Disease Mother         merkel cell     Obesity Mother      Obesity Father      Diabetes Sister      Obesity Sister      Hypertension Sister      Obesity Sister      Gastrointestinal Disease Brother         Collagenous colitis     Diabetes Brother      Obesity Brother      Diabetes Maternal Grandmother      Asthma Son          Current Outpatient Medications   Medication Sig Dispense Refill     acyclovir (ZOVIRAX) 5 % cream Apply topically 5 times daily Apply to cold sores/fever blisters (Patient taking differently: Apply topically 5 times daily Apply to cold sores/fever blisters as needed) 5 g 3     ALPRAZolam (XANAX) 0.25 MG tablet TAKE ONE TABLET BY MOUTH DAILY AS NEEDED FOR ANXIETY. 30 tablet 0     Calcium Carbonate-Vit D-Min (CALCIUM 1200 PO) Take by mouth daily       clidinium-chlordiazePOXIDE (LIBRAX) 5-2.5 MG capsule Daily PRN, For spasms 30 capsule 0     cyclobenzaprine (FLEXERIL) 10 MG tablet TAKE 1/2 TO 1 TABLET(5 TO 10 MG) BY MOUTH EVERY 8 HOURS AS NEEDED FOR MUSCLE SPASMS 30 tablet 3     desvenlafaxine succinate (PRISTIQ) 25 MG 24 hr tablet Take 1 tablet (25 mg) by mouth daily 90 tablet 3     diphenhydrAMINE (BENADRYL) 25 MG capsule Take 25-50 mg by mouth daily Post nasal drip causing esophageal spasms       estradiol (ESTRACE) 2 MG tablet Take 2 mg by mouth daily       fluticasone (FLONASE) 50 MCG/ACT nasal spray Spray 1 spray into both nostrils daily 16 g 1     fluticasone (FLOVENT HFA) 220 MCG/ACT inhaler  INHALE 2 PUFFS INTO THE LUNGS TWICE DAILY 12 g 3     folic acid (FOLVITE) 1 MG tablet TAKE 1 TABLET BY MOUTH EVERY DAY. 90 tablet 3     levocetirizine (XYZAL) 5 MG tablet Take 5 mg by mouth every evening       LEVSIN/SL 0.125 MG SL SUBL 1 TAB 4 TIMES DAILY, BEFORE MEALS AND AT BEDTIME 30 12     Magnesium 500 MG CAPS Take 500 mg by mouth daily       methotrexate 2.5 MG tablet CHEMO Take 12.5 mg by mouth every 7 days (5 x 2.5 mg = 12.5 mg dose)       metroNIDAZOLE (METROGEL) 1 % gel Apply  topically 2 times daily. 30 g 12     NIFEdipine ER OSMOTIC (PROCARDIA XL) 60 MG 24 hr tablet Take 60 mg by mouth daily       order for DME Equipment being ordered: DME Compression stockings 2 pairs, knee high 20-30 mm pressure and Thigh high 2 pairs 20-30 mm pressure. 4 each 1     order for DME Equipment being ordered: Medium compression stockings   20-30 mm 2 each 1     order for DME Equipment being ordered: Compression stockings  20-30 mm pressure 2 each 3     order for DME Equipment being ordered:  Compression pump  Flexitouch 1 each 0     PROAIR RESPICLICK 108 (90 Base) MCG/ACT inhaler INHALE 2 PUFFS INTO THE LUNGS EVERY 6 HOURS AS NEEDED FOR SHORTNESS OF BREATH OR DIFFICULT BREATHING OR WHEEZING 1 each 2     Probiotic Product (PROBIOTIC DAILY PO) Take 1 capsule by mouth daily 69 mg/ 15 Billion CFU       psyllium 0.52 G capsule Take 1 capsule by mouth daily (Fiber)       REMICADE 100 MG injection Inject into the vein every 2 months        Vitamin D, Cholecalciferol, 1000 units TABS Take 1,000 Units by mouth daily       Allergies   Allergen Reactions     Alcohol Other (See Comments)     flushing     Fentanyl Nausea and Vomiting     Food      Corn, mushrooms, all peppers     Morphine Nausea and Vomiting     vomiting     Pcn [Penicillins] Hives     Shellfish Allergy Hives     Hives over entire body       Breast Cancer Screening:  Any new diagnosis of family breast, ovarian, or bowel cancer? No    FHS-7:   Breast CA Risk Assessment  (FHS-7) 8/27/2021   Did any of your first-degree relatives have breast or ovarian cancer? Yes   Did any of your relatives have bilateral breast cancer? No   Did any man in your family have breast cancer? No   Did any woman in your family have breast and ovarian cancer? Yes   Did any woman in your family have breast cancer before age 50 y? No   Do you have 2 or more relatives with breast and/or ovarian cancer? No   Do you have 2 or more relatives with breast and/or bowel cancer? No       Mammogram Screening: Recommended annual mammography  Pertinent mammograms are reviewed under the imaging tab.    History of abnormal Pap smear: Status post benign hysterectomy. Health Maintenance and Surgical History updated.     Reviewed and updated as needed this visit by clinical staff   Tobacco  Allergies  Meds  Problems   Surg Hx  Fam Hx            Reviewed and updated as needed this visit by Provider     Meds  Problems   Surg Hx  Fam Hx           Past Medical History:   Diagnosis Date     Allergic rhinitis, cause unspecified      Dizziness and giddiness      Esophageal spasm 2003    nifedipine, s/p heller myotomy     Immunocompromised (H) 8/10/2018     Macrocytosis      Obesity      Other and unspecified hormones and synthetic substitutes causing adverse effect in therapeutic use     On BCP, Dr Mijares     Other urethritis(597.89)      PONV (postoperative nausea and vomiting)      Rosacea      Travel     valium,xanax narcotic agreement     Ulcerative colitis, unspecified     F/B [facility]     Uncomplicated asthma      Unspecified disorder of esophagus     Achalasia, spasm     Vertigo       Past Surgical History:   Procedure Laterality Date     ABDOMEN SURGERY       BIOPSY      BREAST     DILATION AND CURETTAGE, HYSTEROSCOPY DIAGNOSTIC, COMBINED N/A 8/24/2015    Procedure: COMBINED DILATION AND CURETTAGE, HYSTEROSCOPY DIAGNOSTIC;  Surgeon: Chelle Rebollar MD;  Location: [facility]     DILATION AND CURETTAGE, OPERATIVE  "HYSTEROSCOPY WITH MORCELLATOR, COMBINED N/A 2019    Procedure: HYSTEROSCOPY, WITH DILATION AND CURETTAGE OF UTERUS USING MORCELLATOR (MYOSURE);  Surgeon: Chelle Rebollar MD;  Location:  OR     GI SURGERY       GYN SURGERY       x3     LAPAROSCOPIC HYSTERECTOMY TOTAL, BILATERAL SALPINGO-OOPHORECTOMY, COMBINED N/A 2019    Procedure: TOTAL LAPAROSCOPIC HYSTERECTOMY, BILATERAL SALPINGO OOPHORECTOMY, LYSIS OF ADHESIONS, CYSTOCOPY, RIGID PROCTO, laparoscopic repair seromyotomy;  Surgeon: Chelle Rebollar MD;  Location:  OR     [facility] NONSPECIFIC PROCEDURE      Heller myotomy     [facility] NONSPECIFIC PROCEDURE      EB for spotting,fibroid     [facility] NONSPECIFIC PROCEDURE      rt breast biopsy     [facility] COLONOSCOPY THRU STOMA, DIAGNOSTIC         Review of Systems   Constitutional: Negative for chills and fever.   HENT: Negative for congestion, ear pain, hearing loss and sore throat.    Eyes: Negative for pain and visual disturbance.   Respiratory: Negative for cough and shortness of breath.    Cardiovascular: Positive for peripheral edema. Negative for chest pain and palpitations.   Gastrointestinal: Negative for abdominal pain, constipation, diarrhea, heartburn, hematochezia and nausea.   Breasts:  Negative for tenderness, breast mass and discharge.   Genitourinary: Negative for dysuria, frequency, genital sores, hematuria, pelvic pain, urgency, vaginal bleeding and vaginal discharge.   Musculoskeletal: Negative for arthralgias, joint swelling and myalgias.   Skin: Negative for rash.   Neurological: Negative for dizziness, weakness, headaches and paresthesias.   Psychiatric/Behavioral: Negative for mood changes. The patient is not nervous/anxious.           OBJECTIVE:   BP 95/61 (BP Location: Right arm, Patient Position: Sitting, Cuff Size: Adult Large)   Pulse 86   Temp 97.1  F (36.2  C) (Temporal)   Resp 18   Ht 1.565 m (5' 1.61\")   Wt 86.3 kg (190 lb 4.8 oz)   SpO2 97%   " Breastfeeding No   BMI 35.24 kg/m    Physical Exam  GENERAL APPEARANCE: healthy, alert and no distress  EYES: Eyes grossly normal to inspection, PERRL and conjunctivae and sclerae normal  HENT: ear canals and TM's normal, nose and mouth without ulcers or lesions, oropharynx clear and oral mucous membranes moist  NECK: no adenopathy, no asymmetry, masses, or scars and thyroid normal to palpation  RESP: lungs clear to auscultation - no rales, rhonchi or wheezes  BREAST: normal without masses, tenderness or nipple discharge and no palpable axillary masses or adenopathy  CV: regular rate and rhythm, normal S1 S2, no S3 or S4, no murmur, click or rub, no peripheral edema and peripheral pulses strong  ABDOMEN: soft, nontender, no hepatosplenomegaly, no masses and bowel sounds normal  MS: no musculoskeletal defects are noted and gait is age appropriate without ataxia  SKIN: Multiple benign skin lesions no suspicious lesions or rashes  NEURO: Normal strength and tone, sensory exam grossly normal, mentation intact and speech normal  PSYCH: mentation appears normal and affect normal/bright      ASSESSMENT/PLAN:   John was seen today for physical.    Diagnoses and all orders for this visit:    Routine general medical examination at a health care facility  Very pleasant 62 years old who is calculated American Heart Association risk is only 3%  Annual mammogram and no need of Pap smears unless indicated by her gynecologist due to her immunocompromise    Ulcerative colitis with complication, unspecified location (H)  -     Hemoglobin A1c; Future  -     Glucose; Future  -     clidinium-chlordiazePOXIDE (LIBRAX) 5-2.5 MG capsule; Daily PRN, For spasms  Please see below in patient is in remission and up-to-date on colonoscopy  Immunocompromised (H) patient is on methotrexate and Remicade  -     Pneumococcal 20 Valent Conjugate (Prevnar 20)  -     REVIEW OF HEALTH MAINTENANCE PROTOCOL ORDERS  -     Hemoglobin A1c; Future  -      "Glucose; Future    Encounter for immunization  -     Pneumococcal 20 Valent Conjugate (Prevnar 20)    Travel advice encounter  -     ALPRAZolam (XANAX) 0.25 MG tablet; TAKE ONE TABLET BY MOUTH DAILY AS NEEDED FOR ANXIETY.    Mild persistent asthma without complication  Patient has no complications but knows to take steroids for flareup  CARDIOVASCULAR SCREENING; LDL GOAL LESS THAN 100  -     Lipid panel reflex to direct LDL Fasting; Future    Situational anxiety  -     desvenlafaxine succinate (PRISTIQ) 25 MG 24 hr tablet; Take 1 tablet (25 mg) by mouth daily  No side effects from Pristiq  Disorder of esophagus  -     clidinium-chlordiazePOXIDE (LIBRAX) 5-2.5 MG capsule; Daily PRN, For spasms  I refilled this for patient          COUNSELING:  Prevnar 20    Estimated body mass index is 35.24 kg/m  as calculated from the following:    Height as of this encounter: 1.565 m (5' 1.61\").    Weight as of this encounter: 86.3 kg (190 lb 4.8 oz).    Weight management plan: Discussed healthy diet and exercise guidelines    She reports that she quit smoking about 33 years ago. Her smoking use included cigarettes. She has a 10.00 pack-year smoking history. She has never used smokeless tobacco.      Counseling Resources:  ATP IV Guidelines  Pooled Cohorts Equation Calculator  Breast Cancer Risk Calculator  BRCA-Related Cancer Risk Assessment: FHS-7 Tool  FRAX Risk Assessment  ICSI Preventive Guidelines  Dietary Guidelines for Americans, 2010  USDA's MyPlate  ASA Prophylaxis  Lung CA Screening    Rianna Sim MD  [facility]  "

## 2022-09-30 ENCOUNTER — OFFICE VISIT (OUTPATIENT)
Dept: FAMILY MEDICINE | Facility: CLINIC | Age: 62
End: 2022-09-30
Payer: COMMERCIAL

## 2022-09-30 VITALS
SYSTOLIC BLOOD PRESSURE: 95 MMHG | HEIGHT: 62 IN | WEIGHT: 190.3 LBS | BODY MASS INDEX: 35.02 KG/M2 | RESPIRATION RATE: 18 BRPM | DIASTOLIC BLOOD PRESSURE: 61 MMHG | TEMPERATURE: 97.1 F | HEART RATE: 86 BPM | OXYGEN SATURATION: 97 %

## 2022-09-30 DIAGNOSIS — K22.9 DISORDER OF ESOPHAGUS: ICD-10-CM

## 2022-09-30 DIAGNOSIS — Z23 ENCOUNTER FOR IMMUNIZATION: ICD-10-CM

## 2022-09-30 DIAGNOSIS — Z71.84 TRAVEL ADVICE ENCOUNTER: ICD-10-CM

## 2022-09-30 DIAGNOSIS — K51.919 ULCERATIVE COLITIS WITH COMPLICATION, UNSPECIFIED LOCATION (H): ICD-10-CM

## 2022-09-30 DIAGNOSIS — Z00.00 ROUTINE GENERAL MEDICAL EXAMINATION AT A HEALTH CARE FACILITY: Primary | ICD-10-CM

## 2022-09-30 DIAGNOSIS — F41.8 SITUATIONAL ANXIETY: ICD-10-CM

## 2022-09-30 DIAGNOSIS — Z13.6 CARDIOVASCULAR SCREENING; LDL GOAL LESS THAN 100: ICD-10-CM

## 2022-09-30 DIAGNOSIS — D84.9 IMMUNOCOMPROMISED (H): ICD-10-CM

## 2022-09-30 DIAGNOSIS — J45.30 MILD PERSISTENT ASTHMA WITHOUT COMPLICATION: ICD-10-CM

## 2022-09-30 LAB
CHOLEST SERPL-MCNC: 186 MG/DL
FASTING STATUS PATIENT QL REPORTED: YES
FASTING STATUS PATIENT QL REPORTED: YES
GLUCOSE BLD-MCNC: 126 MG/DL (ref 70–99)
HBA1C MFR BLD: 5.4 % (ref 0–5.6)
HDLC SERPL-MCNC: 85 MG/DL
LDLC SERPL CALC-MCNC: 87 MG/DL
NONHDLC SERPL-MCNC: 101 MG/DL
TRIGL SERPL-MCNC: 68 MG/DL

## 2022-09-30 PROCEDURE — 36415 COLL VENOUS BLD VENIPUNCTURE: CPT | Performed by: INTERNAL MEDICINE

## 2022-09-30 PROCEDURE — 90677 PCV20 VACCINE IM: CPT | Performed by: INTERNAL MEDICINE

## 2022-09-30 PROCEDURE — 90471 IMMUNIZATION ADMIN: CPT | Performed by: INTERNAL MEDICINE

## 2022-09-30 PROCEDURE — 99396 PREV VISIT EST AGE 40-64: CPT | Mod: 25 | Performed by: INTERNAL MEDICINE

## 2022-09-30 PROCEDURE — 83036 HEMOGLOBIN GLYCOSYLATED A1C: CPT | Performed by: INTERNAL MEDICINE

## 2022-09-30 PROCEDURE — 80061 LIPID PANEL: CPT | Performed by: INTERNAL MEDICINE

## 2022-09-30 PROCEDURE — 82947 ASSAY GLUCOSE BLOOD QUANT: CPT | Performed by: INTERNAL MEDICINE

## 2022-09-30 RX ORDER — CHLORDIAZEPOXIDE HYDROCHLORIDE AND CLIDINIUM BROMIDE 5; 2.5 MG/1; MG/1
CAPSULE ORAL
Qty: 30 CAPSULE | Refills: 0 | Status: SHIPPED | OUTPATIENT
Start: 2022-09-30

## 2022-09-30 RX ORDER — ALPRAZOLAM 0.25 MG
TABLET ORAL
Qty: 30 TABLET | Refills: 0 | Status: SHIPPED | OUTPATIENT
Start: 2022-09-30

## 2022-09-30 RX ORDER — DESVENLAFAXINE 25 MG/1
25 TABLET, EXTENDED RELEASE ORAL DAILY
Qty: 90 TABLET | Refills: 3 | Status: SHIPPED | OUTPATIENT
Start: 2022-09-30 | End: 2022-12-05

## 2022-09-30 ASSESSMENT — ASTHMA QUESTIONNAIRES: ACT_TOTALSCORE: 25

## 2022-09-30 ASSESSMENT — PAIN SCALES - GENERAL: PAINLEVEL: NO PAIN (0)

## 2022-09-30 NOTE — NURSING NOTE
Prior to immunization administration, verified patients identity using patient s name and date of birth. Please see Immunization Activity for additional information.     Screening Questionnaire for Adult Immunization    Are you sick today?   No   Do you have allergies to medications, food, a vaccine component or latex?   Yes   Have you ever had a serious reaction after receiving a vaccination?   No   Do you have a long-term health problem with heart, lung, kidney, or metabolic disease (e.g., diabetes), asthma, a blood disorder, no spleen, complement component deficiency, a cochlear implant, or a spinal fluid leak?  Are you on long-term aspirin therapy?   No   Do you have cancer, leukemia, HIV/AIDS, or any other immune system problem?   No   Do you have a parent, brother, or sister with an immune system problem?   No   In the past 3 months, have you taken medications that affect  your immune system, such as prednisone, other steroids, or anticancer drugs; drugs for the treatment of rheumatoid arthritis, Crohn s disease, or psoriasis; or have you had radiation treatments?   Yes   Have you had a seizure, or a brain or other nervous system problem?   No   During the past year, have you received a transfusion of blood or blood    products, or been given immune (gamma) globulin or antiviral drug?   No   For women: Are you pregnant or is there a chance you could become       pregnant during the next month?   No   Have you received any vaccinations in the past 4 weeks?   Yes - COVID and Flu Shot      Immunization questionnaire was positive for at least one answer.  Notified Dr. Sim.        Per orders of Dr. Sim, injection of Prevnar 20 given by Kenzie Clay. Patient instructed to remain in clinic for 15 minutes afterwards, and to report any adverse reaction to me immediately.       Screening performed by Kenzie Clay on 9/30/2022 at 11:07 AM.

## 2022-10-24 ENCOUNTER — HOSPITAL ENCOUNTER (OUTPATIENT)
Dept: MAMMOGRAPHY | Facility: CLINIC | Age: 62
Discharge: HOME | End: 2022-10-24
Attending: OBSTETRICS & GYNECOLOGY | Admitting: OBSTETRICS & GYNECOLOGY
Payer: COMMERCIAL

## 2022-10-24 DIAGNOSIS — Z12.31 VISIT FOR SCREENING MAMMOGRAM: ICD-10-CM

## 2022-10-24 PROCEDURE — 77067 SCR MAMMO BI INCL CAD: CPT

## 2022-12-01 ENCOUNTER — TRANSFERRED RECORDS (OUTPATIENT)
Dept: HEALTH INFORMATION MANAGEMENT | Facility: CLINIC | Age: 62
End: 2022-12-01

## 2022-12-01 LAB
ALT SERPL-CCNC: 15 IU/L (ref 0–32)
AST SERPL-CCNC: 20 IU/L (ref 0–40)
CREATININE (EXTERNAL): 0.74 MG/DL (ref 0.57–1)
GFR ESTIMATED (EXTERNAL): 91 ML/MIN/1.73

## 2022-12-15 ENCOUNTER — HOSPITAL ENCOUNTER (OUTPATIENT)
Dept: BONE DENSITY | Facility: CLINIC | Age: 62
Discharge: HOME | End: 2022-12-15
Attending: INTERNAL MEDICINE | Admitting: INTERNAL MEDICINE
Payer: COMMERCIAL

## 2022-12-15 DIAGNOSIS — K51.919 ULCERATIVE COLITIS WITH COMPLICATION, UNSPECIFIED LOCATION (H): ICD-10-CM

## 2022-12-15 PROCEDURE — 77080 DXA BONE DENSITY AXIAL: CPT

## 2023-01-19 ENCOUNTER — TRANSFERRED RECORDS (OUTPATIENT)
Dept: HEALTH INFORMATION MANAGEMENT | Facility: CLINIC | Age: 63
End: 2023-01-19
Payer: COMMERCIAL

## 2023-01-26 ENCOUNTER — TRANSFERRED RECORDS (OUTPATIENT)
Dept: HEALTH INFORMATION MANAGEMENT | Facility: CLINIC | Age: 63
End: 2023-01-26
Payer: COMMERCIAL

## 2023-01-26 LAB
ALT SERPL-CCNC: 16 IU/L (ref 0–32)
AST SERPL-CCNC: 24 IU/L (ref 0–40)
CREATININE (EXTERNAL): 0.72 MG/DL (ref 0.57–1)
GFR ESTIMATED (EXTERNAL): 94 ML/MIN/1.73

## 2023-02-17 ENCOUNTER — MYC MEDICAL ADVICE (OUTPATIENT)
Dept: FAMILY MEDICINE | Facility: CLINIC | Age: 63
End: 2023-02-17
Payer: COMMERCIAL

## 2023-02-17 DIAGNOSIS — B00.1 COLD SORE: ICD-10-CM

## 2023-02-17 RX ORDER — ACYCLOVIR 50 MG/G
CREAM TOPICAL
Qty: 5 G | Refills: 3 | Status: SHIPPED | OUTPATIENT
Start: 2023-02-17

## 2023-02-17 NOTE — TELEPHONE ENCOUNTER
Routing refill request to provider for review/approval because:  Not on protocol  Last Rx in 2018         Requested Prescriptions   Pending Prescriptions Disp Refills     acyclovir (ZOVIRAX) 5 % external cream       Sig: Apply topically 5 times daily Apply to cold sores/fever blisters as needed       There is no refill protocol information for this order        acyclovir (ZOVIRAX) 5 % cream 5 g 3 8/24/2018  No   Sig - Route: Apply topically 5 times daily Apply to cold sores/fever blisters - Topical   Patient taking differently: Apply topically 5 times daily Apply to cold sores/fever blisters as needed        Sent to pharmacy as: acyclovir (ZOVIRAX) 5 % cream   Class: E-Prescribe   Order: [id number]   E-Prescribing Status: Receipt confirmed by pharmacy (8/24/2018  4:01 PM CDT)

## 2023-03-01 DIAGNOSIS — F41.8 SITUATIONAL ANXIETY: ICD-10-CM

## 2023-03-02 RX ORDER — DESVENLAFAXINE 25 MG/1
TABLET, EXTENDED RELEASE ORAL
Qty: 90 TABLET | Refills: 3 | Status: SHIPPED | OUTPATIENT
Start: 2023-03-02 | End: 2023-04-11

## 2023-03-23 ENCOUNTER — TRANSFERRED RECORDS (OUTPATIENT)
Dept: HEALTH INFORMATION MANAGEMENT | Facility: CLINIC | Age: 63
End: 2023-03-23
Payer: COMMERCIAL

## 2023-03-23 LAB
ALT SERPL-CCNC: 19 IU/L (ref 0–32)
AST SERPL-CCNC: 24 IU/L (ref 0–40)
CREATININE (EXTERNAL): 0.75 MG/DL (ref 0.57–1)
GFR ESTIMATED (EXTERNAL): 90 ML/MIN/1.73

## 2023-04-11 DIAGNOSIS — F41.8 SITUATIONAL ANXIETY: ICD-10-CM

## 2023-04-11 RX ORDER — DESVENLAFAXINE 25 MG/1
25 TABLET, EXTENDED RELEASE ORAL DAILY
Qty: 90 TABLET | Refills: 3 | Status: SHIPPED | OUTPATIENT
Start: 2023-03-02 | End: 2024-02-02

## 2023-04-11 NOTE — TELEPHONE ENCOUNTER
Radha Flanagan routed conversation to [facility] 4 days ago     John [facility] Default Pool 4 days ago       Topic: Non-Medical Question.     Hi Dr. Sim,  We have recently changed insurance and are no longer using [facility] for home delivery prescriptions. I have been directed to ask you to call the new pharmacy [facility] which requires you to provide a verbal auathorization for home delivery for my Pristiq. The phone number to call is [phone].  Thank you!     John

## 2023-04-14 NOTE — ANESTHESIA PREPROCEDURE EVALUATION
Anesthesia Pre-Procedure Evaluation    Patient: John Steven   MRN: [id number] : 1960          Preoperative Diagnosis: POST MENOPAUSAL BLEEDING    Procedure(s):  TOTAL LAPAROSCOPIC HYSTERECTOMY, BILATERAL SALPINGO OOPHORECTOMY    Past Medical History:   Diagnosis Date     Allergic rhinitis, cause unspecified      Dizziness and giddiness      Esophageal spasm     nifedipine, s/p heller myotomy     Immunocompromised (H) 8/10/2018     Macrocytosis      Obesity      Other and unspecified hormones and synthetic substitutes causing adverse effect in therapeutic use     On BCP, Dr Mijares     Other urethritis(597.89)      PONV (postoperative nausea and vomiting)      Rosacea      Travel     valium,xanax narcotic agreement     Ulcerative colitis, unspecified     F/B [place] [facility]     Uncomplicated asthma      Unspecified disorder of esophagus     Achalasia, spasm     Vertigo      Past Surgical History:   Procedure Laterality Date     BIOPSY      BREAST     C NONSPECIFIC PROCEDURE      Heller myotomy     C NONSPECIFIC PROCEDURE      EB for spotting,fibroid     C NONSPECIFIC PROCEDURE      rt breast biopsy     DILATION AND CURETTAGE, HYSTEROSCOPY DIAGNOSTIC, COMBINED N/A 2015    Procedure: COMBINED DILATION AND CURETTAGE, HYSTEROSCOPY DIAGNOSTIC;  Surgeon: Chelle Rebollar MD;  Location: [facility]     DILATION AND CURETTAGE, OPERATIVE HYSTEROSCOPY WITH MORCELLATOR, COMBINED N/A 2019    Procedure: HYSTEROSCOPY, WITH DILATION AND CURETTAGE OF UTERUS USING MORCELLATOR (MYOSURE);  Surgeon: Chelle Rebollar MD;  Location:  OR     GI SURGERY       GYN SURGERY       x3     HC COLONOSCOPY THRU STOMA, DIAGNOSTIC       Allergies   Allergen Reactions     Alcohol Other (See Comments)     flushing     Fentanyl Nausea and Vomiting     Food      Corn, mushrooms, all peppers     Morphine Nausea and Vomiting     vomiting     Penicillins Hives     Shellfish Allergy Hives     Hives over entire body      Prior to Admission medications    Medication Sig Start Date End Date Taking? Authorizing Provider   acyclovir (ZOVIRAX) 5 % cream Apply topically 5 times daily Apply to cold sores/fever blisters  Patient taking differently: Apply topically 5 times daily Apply to cold sores/fever blisters as needed 8/24/18   Rianna Sim MD   albuterol (PROAIR HFA, PROVENTIL HFA, VENTOLIN HFA) 108 (90 BASE) MCG/ACT inhaler Inhale 2 puffs into the lungs every 6 hours as needed for shortness of breath / dyspnea or wheezing    Reported, Patient   ALPRAZolam (XANAX) 0.25 MG tablet TAKE TWO TABLETS BY MOUTH THREE TIMES DAILY AS NEEDED FOR ANXIETY. 3/14/19   Rianna Sim MD   clidinium-chlordiazePOXIDE (LIBRAX) 5-2.5 MG CAPS per capsule TAKE ONE CAPSULE BY MOUTH DAILY AS NEEDED 8/24/18   Rianna Sim MD   CORTIFOAM 10 % rectal foam JESSICA REC BID FOR 14 DAYS 8/30/16   Reported, Patient   cyclobenzaprine (FLEXERIL) 10 MG tablet TAKE 1/2 TO 1 TABLET(5 TO 10 MG) BY MOUTH EVERY 8 HOURS AS NEEDED FOR MUSCLE SPASMS 8/10/18   Rianna Sim MD   desvenlafaxine succinate (PRISTIQ) 25 MG 24 hr tablet TAKE 1 TABLET BY MOUTH DAILY 3/19/19   Rianna Sim MD   diphenhydrAMINE (BENADRYL) 25 MG capsule Take 25-50 mg by mouth daily Post nasal drip causing esophageal spasms 5/18/11   Zbigniew Spangler MD   estradiol (ESTRACE) 2 MG tablet Take 2 mg by mouth daily    Reported, Patient   folic acid (FOLVITE) 1 MG tablet TAKE 1 TABLET BY MOUTH EVERY DAY. 4/19/16   Rianna Sim MD   levocetirizine (XYZAL) 5 MG tablet Take 5 mg by mouth every evening    Reported, Patient   LEVSIN/SL 0.125 MG SL SUBL 1 TAB 4 TIMES DAILY, BEFORE MEALS AND AT BEDTIME 3/23/09   Rianna Sim MD   Magnesium 500 MG CAPS Take 500 mg by mouth daily    Reported, Patient   methotrexate 2.5 MG tablet CHEMO Take 12.5 mg by mouth every 7 days (5 x 2.5 mg = 12.5 mg dose) 8/1/18   Reported, Patient   metroNIDAZOLE (METROGEL) 1 % gel Apply  topically 2 times daily.  8/10/18   Rianna Sim MD   NIFEdipine ER OSMOTIC (NIFEDICAL XL) 60 MG 24 hr tablet Take 60 mg by mouth daily    Reported, Patient   Nutritional Supplements (NUTRITIONAL SUPPLEMENT PO) Take 2 capsules by mouth daily GABINO HYDRATE SUPPLEMENT 600 mg    Reported, Patient   order for DME Equipment being ordered: Medium compression stockings   20-30 mm 8/10/18   Rianna Sim MD   order for DME Equipment being ordered: Compression stockings  20-30 mm pressure 11/30/16   Rianna Sim MD   order for DME Equipment being ordered:  Compression pump  Flexitouch 4/15/16   Rianna Sim MD   PROAIR RESPICLICK 108 (90 Base) MCG/ACT inhaler INHALE 2 PUFFS INTO THE LUNGS EVERY 6 HOURS AS NEEDED FOR SHORTNESS OF BREATH OR DIFFICULT BREATHING OR WHEEZING 3/19/19   Rianna Sim MD   Probiotic Product (PROBIOTIC DAILY PO) Take 1 capsule by mouth daily 69 mg/ 15 Billion CFU    Reported, Patient   progesterone (PROMETRIUM) 200 MG capsule Take by mouth daily 1 tablet 7/30/18   Reported, Patient   psyllium 0.52 G capsule Take 1 capsule by mouth daily (Fiber)    Reported, Patient   REMICADE 100 MG injection Inject into the vein every 2 months  7/11/18   Reported, Patient   Vitamin D, Cholecalciferol, 1000 units TABS Take 1,000 Units by mouth daily    Reported, Patient     ECG: Sinus  Rhythm   -  Nonspecific T-abnormality.   ECHO:Interpretation Summary  The EKG portion of this stress test was negative for inducible ischemia (see  echo results below) as there was no chest pain or significant ST changes with  exercise.  The visual ejection fraction is estimated at >70% with normal resting wall  motion and no stress-induced wall motion abnormalities.  This was a normal stress EKG and stress echocardiogram with no evidence of  stress-induced ischemia.  The study was technically difficult. Optison contrast was used without  apparent complications. The results of the stress echocardiogram were  conveyed to the patient today.     Anesthesia  Evaluation     . Pt has had prior anesthetic. Type: General    History of anesthetic complications   - PONV        ROS/MED HX    ENT/Pulmonary:     (+)allergic rhinitis, Mild Persistent asthma , . .   (-) tobacco use, COPD and sleep apnea   Neurologic:     (+)other neuro vertigo   (-) seizures, Neuropathy and migraines   Cardiovascular:     (+) hypertension----. : . . . :. .      (-) CAD, arrhythmias, valvular problems/murmurs and dyslipidemia   METS/Exercise Tolerance:  >4 METS   Hematologic:        (-) history of blood clots, anemia and other hematologic disorder   Musculoskeletal:        (-) arthritis   GI/Hepatic:     (+) Inflammatory bowel disease (ulcerative colitis), Other GI/Hepatic hx esophageal spasm s/p myotomy     (-) GERD and liver disease   Renal/Genitourinary:      (-) renal disease and nephrolithiasis   Endo:     (+) Obesity, .   (-) Type I DM, Type II DM and thyroid disease   Psychiatric:     (+) psychiatric history anxiety      Infectious Disease:        (-) Recent Fever   Malignancy:         Other:                          Physical Exam  Normal systems: cardiovascular, pulmonary and dental    Airway   Mallampati: III  TM distance: >3 FB  Neck ROM: full    Dental     Cardiovascular   Rhythm and rate: regular and normal      Pulmonary    breath sounds clear to auscultation            Lab Results   Component Value Date    WBC 10.9 06/16/2017    HGB 15.6 05/08/2019    HCT 35.8 06/16/2017     06/16/2017    CRP 0.17 08/25/2004    SED 13 08/25/2004     06/16/2017    POTASSIUM 3.9 05/08/2019    CHLORIDE 107 06/16/2017    CO2 19 (L) 06/16/2017    BUN 10 06/16/2017    CR 0.83 05/08/2019     (H) 06/16/2017    YULISA 7.8 (L) 06/16/2017    ALBUMIN 4.2 04/15/2016    PROTTOTAL 8.2 04/15/2016    ALT 16 04/15/2016    AST 16 04/15/2016    ALKPHOS 78 04/15/2016    BILITOTAL 0.4 04/15/2016    TSH 1.26 08/21/2015       Preop Vitals  BP Readings from Last 3 Encounters:   08/09/19 104/69   05/13/19  "113/68   05/08/19 107/68    Pulse Readings from Last 3 Encounters:   08/09/19 97   05/13/19 79   05/08/19 94      Resp Readings from Last 3 Encounters:   05/13/19 12   06/23/17 18   06/16/17 20    SpO2 Readings from Last 3 Encounters:   08/09/19 96%   05/13/19 98%   05/08/19 96%      Temp Readings from Last 1 Encounters:   08/09/19 36.7  C (98.1  F) (Oral)    Ht Readings from Last 1 Encounters:   08/09/19 1.549 m (5' 1\")      Wt Readings from Last 1 Encounters:   08/09/19 88.5 kg (195 lb)    Estimated body mass index is 36.84 kg/m  as calculated from the following:    Height as of 8/9/19: 1.549 m (5' 1\").    Weight as of 8/9/19: 88.5 kg (195 lb).       Anesthesia Plan      History & Physical Review      ASA Status:  3 .        Plan for General, ETT and RSI with Propofol induction. Maintenance will be TIVA.    PONV prophylaxis:  Ondansetron (or other 5HT-3), Aprepitant and Dexamethasone or Solumedrol  Additional equipment: Videolaryngoscope Pre-op atarax, meclizine, tylenol  8mg decadron  Toradol, Dilaudid  No fentanyl  Propofol infusion      Postoperative Care  Postoperative pain management:  Multi-modal analgesia.      Consents  Anesthetic plan, risks, benefits and alternatives discussed with:  Patient..                 Kyung Mota MD  " Yes

## 2023-05-18 ENCOUNTER — TRANSFERRED RECORDS (OUTPATIENT)
Dept: HEALTH INFORMATION MANAGEMENT | Facility: CLINIC | Age: 63
End: 2023-05-18
Payer: COMMERCIAL

## 2023-05-18 LAB
ALT SERPL-CCNC: 21 IU/L (ref 0–32)
AST SERPL-CCNC: 27 IU/L (ref 0–40)
CREATININE (EXTERNAL): 0.88 MG/DL (ref 0.57–1)
GFR ESTIMATED (EXTERNAL): 74 ML/MIN/1.73M2

## 2023-06-26 ENCOUNTER — DOCUMENTATION ONLY (OUTPATIENT)
Dept: FAMILY MEDICINE | Facility: CLINIC | Age: 63
End: 2023-06-26

## 2023-06-26 DIAGNOSIS — I83.93 ASYMPTOMATIC VARICOSE VEINS OF BOTH LOWER EXTREMITIES: Primary | ICD-10-CM

## 2023-07-13 ENCOUNTER — TRANSFERRED RECORDS (OUTPATIENT)
Dept: HEALTH INFORMATION MANAGEMENT | Facility: CLINIC | Age: 63
End: 2023-07-13
Payer: COMMERCIAL

## 2023-07-13 LAB
ALT SERPL-CCNC: 15 IU/L (ref 0–32)
AST SERPL-CCNC: 21 IU/L (ref 0–40)
CREATININE (EXTERNAL): 0.95 MG/DL (ref 0.57–1)
GFR ESTIMATED (EXTERNAL): 67 ML/MIN/1.73

## 2023-09-07 ENCOUNTER — TRANSFERRED RECORDS (OUTPATIENT)
Dept: HEALTH INFORMATION MANAGEMENT | Facility: CLINIC | Age: 63
End: 2023-09-07
Payer: COMMERCIAL

## 2023-09-07 LAB
ALT SERPL-CCNC: 24 IU/L (ref 0–32)
AST SERPL-CCNC: 32 IU/L (ref 0–40)
CREATININE (EXTERNAL): 0.82 MG/DL (ref 0.57–1)
GFR ESTIMATED (EXTERNAL): 80 ML/MIN/1.73

## 2023-10-31 ENCOUNTER — TRANSFERRED RECORDS (OUTPATIENT)
Dept: HEALTH INFORMATION MANAGEMENT | Facility: CLINIC | Age: 63
End: 2023-10-31
Payer: COMMERCIAL

## 2023-10-31 LAB
ALT SERPL-CCNC: 18 IU/L (ref 0–32)
AST SERPL-CCNC: 21 IU/L (ref 0–40)
CREATININE (EXTERNAL): 0.75 MG/DL (ref 0.57–1)
GFR ESTIMATED (EXTERNAL): 89 ML/MIN/1.73

## 2023-11-04 ENCOUNTER — HEALTH MAINTENANCE LETTER (OUTPATIENT)
Age: 63
End: 2023-11-04

## 2023-11-17 ENCOUNTER — MYC MEDICAL ADVICE (OUTPATIENT)
Dept: FAMILY MEDICINE | Facility: CLINIC | Age: 63
End: 2023-11-17
Payer: COMMERCIAL

## 2023-11-17 ENCOUNTER — TELEPHONE (OUTPATIENT)
Dept: FAMILY MEDICINE | Facility: CLINIC | Age: 63
End: 2023-11-17
Payer: COMMERCIAL

## 2023-11-17 NOTE — TELEPHONE ENCOUNTER
General Call    Contacts         Type Contact Phone/Fax    11/17/2023 11:42 AM CST Phone (Incoming) John Steven (Self) [phone] (M)          Reason for Call:  Calling to find out which doctors are taking new patients.      What are your questions or concerns:  Can you send a message by both [facility] and mail of which providers are taking new patients?    Date of last appointment with provider: 09-30-22    Could we send this information to you in [facility] or would you prefer to receive a phone call?:   Patient would like to be contacted via [employer]

## 2023-11-17 NOTE — TELEPHONE ENCOUNTER
Please see mychart from patient and advise appropriate course of action.      Vishal Gilbert RN  [facility] Triage Nurse

## 2023-11-21 ENCOUNTER — MYC MEDICAL ADVICE (OUTPATIENT)
Dept: FAMILY MEDICINE | Facility: CLINIC | Age: 63
End: 2023-11-21
Payer: COMMERCIAL

## 2023-12-07 ENCOUNTER — TRANSFERRED RECORDS (OUTPATIENT)
Dept: HEALTH INFORMATION MANAGEMENT | Facility: CLINIC | Age: 63
End: 2023-12-07
Payer: COMMERCIAL

## 2023-12-11 NOTE — TELEPHONE ENCOUNTER
Connected with Pt that [facility] is out of network for provider and she will look outside of the company for a new PCP that is in network

## 2023-12-11 NOTE — TELEPHONE ENCOUNTER
Sultana Marcos, clinic manager, asked writer to look into insurance as to whether provider and separately clinic were in network for Pt. Writer called the [phone] number provided by Pt below which routed me to [place]. The staff indicated that since service is in [place] that they cannot help me and to reach out to the local office at [phone].    Connected with local [place] office that stated this is a [place] plan and that they could not help. They transferred me back to [place] but to the claims department which is not my intended office. They in-turn sent me to [phone] which is where I was able to obtain the following information:    Provider Rianna Sim and [facility] are out of network for Pt, which is documented within reference number [id number]

## 2023-12-11 NOTE — TELEPHONE ENCOUNTER
Connected with Pt and explained the situation of provider and clinic being out of network. Pt was disappointed but understood. She said she will go elsewhere for a new PCP but is wondering if Dr. Sim has any suggestions on who to establish with outside of [facility]?    I also explained that the reason Denisse reached out to Pt on 11.21.23 about finding a new provider was based on the call made to clinic by Pt on 11.17.23 asking for a list of providers taking new Pt's. Pt declined making such call but ultimately, as we are out of network, will be finding a provider outside of [facility] anyways.

## 2023-12-11 NOTE — TELEPHONE ENCOUNTER
Connected with Pt that this response was in regards to an encounter dated 11.17.23:        [facility] is now out of network for the Pt, in which she will look elsewhere for a new PCP

## 2023-12-28 ENCOUNTER — TRANSFERRED RECORDS (OUTPATIENT)
Dept: HEALTH INFORMATION MANAGEMENT | Facility: CLINIC | Age: 63
End: 2023-12-28
Payer: COMMERCIAL

## 2023-12-28 LAB
ALT SERPL-CCNC: 10 IU/L (ref 0–32)
AST SERPL-CCNC: 16 IU/L (ref 0–40)
CREATININE (EXTERNAL): 0.78 MG/DL (ref 0.57–1)
GFR ESTIMATED (EXTERNAL): 85 ML/MIN/1.73

## 2024-01-25 ENCOUNTER — TRANSFERRED RECORDS (OUTPATIENT)
Dept: HEALTH INFORMATION MANAGEMENT | Facility: CLINIC | Age: 64
End: 2024-01-25
Payer: COMMERCIAL

## 2024-02-02 ENCOUNTER — MYC MEDICAL ADVICE (OUTPATIENT)
Dept: FAMILY MEDICINE | Facility: CLINIC | Age: 64
End: 2024-02-02
Payer: COMMERCIAL

## 2024-02-02 DIAGNOSIS — F41.8 SITUATIONAL ANXIETY: ICD-10-CM

## 2024-02-06 RX ORDER — DESVENLAFAXINE 25 MG/1
25 TABLET, EXTENDED RELEASE ORAL DAILY
Qty: 90 TABLET | Refills: 3 | Status: SHIPPED | OUTPATIENT
Start: 2024-02-06

## 2024-02-22 ENCOUNTER — TRANSFERRED RECORDS (OUTPATIENT)
Dept: HEALTH INFORMATION MANAGEMENT | Facility: CLINIC | Age: 64
End: 2024-02-22
Payer: COMMERCIAL

## 2024-04-18 ENCOUNTER — TRANSFERRED RECORDS (OUTPATIENT)
Dept: HEALTH INFORMATION MANAGEMENT | Facility: CLINIC | Age: 64
End: 2024-04-18
Payer: COMMERCIAL

## 2024-04-18 LAB
ALT SERPL-CCNC: 30 IU/L (ref 0–32)
AST SERPL-CCNC: 30 IU/L (ref 0–40)
CREATININE (EXTERNAL): 0.93 MG/DL (ref 0.57–1)
GFR ESTIMATED (EXTERNAL): 69 ML/MIN/1.73M2

## 2024-06-13 ENCOUNTER — TRANSFERRED RECORDS (OUTPATIENT)
Dept: HEALTH INFORMATION MANAGEMENT | Facility: CLINIC | Age: 64
End: 2024-06-13
Payer: COMMERCIAL

## 2024-06-13 LAB
ALT SERPL-CCNC: 26 IU/L (ref 0–32)
AST SERPL-CCNC: 31 IU/L (ref 0–40)
CREATININE (EXTERNAL): 0.94 MG/DL (ref 0.57–1)
GFR ESTIMATED (EXTERNAL): 68 ML/MIN/1.73

## 2024-08-08 ENCOUNTER — TRANSFERRED RECORDS (OUTPATIENT)
Dept: HEALTH INFORMATION MANAGEMENT | Facility: CLINIC | Age: 64
End: 2024-08-08
Payer: COMMERCIAL

## 2024-08-08 LAB
ALT SERPL-CCNC: 19 IU/L (ref 0–32)
AST SERPL-CCNC: 24 IU/L (ref 0–40)
CREATININE (EXTERNAL): 0.91 MG/DL (ref 0.57–1)
GFR ESTIMATED (EXTERNAL): 70 ML/MIN/1.73

## 2024-10-11 ENCOUNTER — TRANSFERRED RECORDS (OUTPATIENT)
Dept: HEALTH INFORMATION MANAGEMENT | Facility: CLINIC | Age: 64
End: 2024-10-11
Payer: COMMERCIAL

## 2024-10-11 LAB
ALT SERPL-CCNC: 21 IU/L (ref 0–32)
AST SERPL-CCNC: 24 IU/L (ref 0–40)
CREATININE (EXTERNAL): 0.98 MG/DL (ref 0.57–1)
GFR ESTIMATED (EXTERNAL): 64 ML/MIN/1.73

## 2024-12-12 ENCOUNTER — TRANSFERRED RECORDS (OUTPATIENT)
Dept: HEALTH INFORMATION MANAGEMENT | Facility: CLINIC | Age: 64
End: 2024-12-12
Payer: COMMERCIAL

## 2025-01-15 ENCOUNTER — TRANSFERRED RECORDS (OUTPATIENT)
Dept: HEALTH INFORMATION MANAGEMENT | Facility: CLINIC | Age: 65
End: 2025-01-15
Payer: COMMERCIAL

## 2025-01-25 ENCOUNTER — HEALTH MAINTENANCE LETTER (OUTPATIENT)
Age: 65
End: 2025-01-25

## 2025-01-30 ENCOUNTER — TRANSFERRED RECORDS (OUTPATIENT)
Dept: HEALTH INFORMATION MANAGEMENT | Facility: CLINIC | Age: 65
End: 2025-01-30
Payer: COMMERCIAL

## 2025-02-14 ENCOUNTER — TRANSFERRED RECORDS (OUTPATIENT)
Dept: HEALTH INFORMATION MANAGEMENT | Facility: CLINIC | Age: 65
End: 2025-02-14
Payer: COMMERCIAL

## 2025-05-08 ENCOUNTER — PATIENT OUTREACH (OUTPATIENT)
Dept: CARE COORDINATION | Facility: CLINIC | Age: 65
End: 2025-05-08
Payer: COMMERCIAL

## 2025-05-29 ENCOUNTER — TRANSFERRED RECORDS (OUTPATIENT)
Dept: MULTI SPECIALTY CLINIC | Facility: CLINIC | Age: 65
End: 2025-05-29
Payer: COMMERCIAL

## 2025-05-29 ENCOUNTER — TRANSFERRED RECORDS (OUTPATIENT)
Dept: ADMINISTRATIVE | Facility: CLINIC | Age: 65
End: 2025-05-29
Payer: COMMERCIAL

## 2025-05-29 LAB
ALT SERPL-CCNC: 31 IU/L (ref 0–32)
AST SERPL-CCNC: 39 IU/L (ref 0–40)
CREATININE (EXTERNAL): 0.91 MG/DL (ref 0.57–1)
GFR ESTIMATED (EXTERNAL): 70 ML/MIN/1.73

## 2025-05-30 NOTE — PROGRESS NOTES
_________________________________________________________________________________________________    Patient name: John Steven  YOB: 1960  Encounter date: 05/29/2025 08:15 AM  Current date: 05/29/2025 09:55 AM  Procedure: Infusion      Infusion/Additional Medication Orders    Assessment: Left sided ulcerative colitis without complication K51.50     Medication grids  Order Date Medication Dose Route Rate Rate 2 Rate 3 Rate 4 Int. of Treat.   09/30/2024 Avsola 7.5mg/kg  mL/hr    8 Weeks   Inf. Date Medication % Conc. Inf. # Therapy Type Bag # Vials Total mgs Lot # Exp. Date   05/29/2025 Avsola  46 maintenance  7 666.00 [id number] 08/31/2028   Inf. Date Medication IV Site IV Gauge Start Stop Total Time Wt. (lbs) Wt. (kgs)   05/29/2025 Avsola left antecubital 22 8:00 AM 0900 AM 0 hour(s) 0 minute(s) 195.50 88.662     Vitals Flowsheet  Time Temp Pulse Resp Sys BP Wendy BP Reaction Conc Rate   7:50 AM 95.8 97 17 106 54      8:30 AM 96.2 70 17 98 51      9:00 AM 96.2 67 17 117 51        Post Infusion  The patient did tolerate the infusion.     Nursing Notes  Order date:9/30/24  Last infusion date:3/27/25  Oral premeds per order: N/A  Specialty Pharmacy: N  Change in health status per assessment? N (if Y, describe)  IV maintenance 0.9% NS 500ml TKO  Start time:0750  IV start by: Ary Alvarez RN  IV and Fluid D/C time:0915  NS volume infused: <50mL  Site condition: CDI and patent throughout infusion, no redness/swelling at IV site  D/C instructions reviewed, Pt verbalized understanding.  Ary Alvarez RN    *labs drawn from IV    Date Medication Total mg Used mg Wasted mg   05/29/2025 Avsola 700.00 666.00 34.00   03/27/2025 Avsola 700.00 700.00 0.00   01/30/2025 Avsola 700.00 700.00 0.00   12/05/2024 Avsola 700.00 700.00 0.00   10/11/2024 Avsola 700.00 700.00 0.00   08/08/2024 Remicade 700.00 700.00 0.00       Visit oversight provided by:  Min Michael MD 05/29/2025 08:15 AM

## 2025-06-03 ENCOUNTER — TRANSFERRED RECORDS (OUTPATIENT)
Dept: ADMINISTRATIVE | Facility: CLINIC | Age: 65
End: 2025-06-03
Payer: COMMERCIAL

## 2025-06-04 NOTE — PROCEDURES
2025        John Steven   [address] [place] [address]      John Steven,  :  1960    I am writing to let you know the results of the tests that were done the other day.   Thank you for allowing [facility] the opportunity to take part in your healthcare.  At [facility] we strive to provide each patient with the finest gastroenterology care available.  We hope your experience was pleasant and informative.      Your labs look great. That one liver test is still mildly elevated but stable; we will monitor this.  I hope all is well with you, take care!      Creatinine 2025 07:49   Description Result Units Flags Range   Creatinine 0.91 mg/dL  0.57-1.00   eGFR 70 mL/min/1.73  >59   Comments   Performed At: DV, [facility] [address] [place], [place], [id number]  Bladimir Olmos MD, Phone: [phone]   BUN 2025 07:49   Description Result Units Flags Range   BUN 14 mg/dL  8-27   Comments   Performed At: [facility], [facility] [address] [place], [place], [id number]  Bladimir Olmos MD, Phone: [phone]   Hepatic Function Panel (7) 2025 07:49   Description Result Units Flags Range   Bilirubin, Total 0.4 mg/dL  0.0-1.2   Bilirubin, Direct 0.15 mg/dL  0.00-0.40   AST (SGOT) 39 IU/L  0-40   ALT (SGPT) 31 IU/L  0-32   Albumin 4.3 g/dL  3.9-4.9   Alkaline Phosphatase 134 IU/L H    Protein, Total 7.3 g/dL  6.0-8.5   Comments   Performed At: DV, [facility] [address] [place], [place], [id number]  Bladimir Olmos MD, Phone: [phone]   CBC With Differential/Platelet 2025 07:49   Description Result Units Flags Range   Hemoglobin 14.8 g/dL  11.1-15.9   Hematocrit 43.5 %  34.0-46.6    fL H 79-97   MCHC 34.0 g/dL  31.5-35.7   MCH 35.6 pg H 26.6-33.0   RDW 14.7 %  11.7-15.4   Platelets 336 x10E3/uL  150-450   Neutrophils 45 %  Not Estab.   Lymphs 33 %  Not Estab.   Monocytes 11 %  Not Estab.   Eos 9 %  Not Estab.   Basos 1 %  Not Estab.   Neutrophils (Absolute) 2.8  x10E3/uL  1.4-7.0   Lymphs (Absolute) 2.1 x10E3/uL  0.7-3.1   Monocytes(Absolute) 0.7 x10E3/uL  0.1-0.9   Eos (Absolute) 0.6 x10E3/uL H 0.0-0.4   Baso (Absolute) 0.1 x10E3/uL  0.0-0.2   Immature Grans (Abs) 0.0 x10E3/uL  0.0-0.1   Immature Granulocytes 1 %  Not Estab.   RBC 4.16 x10E6/uL  3.77-5.28   WBC 6.2 x10E3/uL  3.4-10.8   Comments   First Available              Performed At: , [facility] [address], [place], [place], [id number]  Bladimir Olmos MD, Phone: [phone]           Thank you.    Electronically signed by:  Vonda COLLINS 06/03/2025 06:52 PM  Document generated by:  Vonda COLLINS  06/03/2025  If your provider ordered multiple tests; the results may not become available at the same time.  If multiple test results are received within 14 days of one another, you may receive a duplicate.  cc:  Chantel Tolliver DO

## 2025-07-23 ENCOUNTER — TRANSFERRED RECORDS (OUTPATIENT)
Dept: MULTI SPECIALTY CLINIC | Facility: CLINIC | Age: 65
End: 2025-07-23
Payer: COMMERCIAL

## 2025-07-23 ENCOUNTER — TRANSFERRED RECORDS (OUTPATIENT)
Dept: GASTROENTEROLOGY | Facility: CLINIC | Age: 65
End: 2025-07-23
Payer: COMMERCIAL

## 2025-07-23 LAB
ALT SERPL-CCNC: 21 IU/L (ref 0–32)
AST SERPL-CCNC: 32 IU/L (ref 0–40)
CREATININE (EXTERNAL): 0.94 MG/DL (ref 0.57–1)
GFR ESTIMATED (EXTERNAL): 67 ML/MIN/1.73

## 2025-07-24 NOTE — PROGRESS NOTES
_________________________________________________________________________________________________    Patient name: John Steven  YOB: 1960  Encounter date: 07/23/2025 09:15 AM  Current date: 07/23/2025 10:45 AM  Procedure: Infusion      Infusion/Additional Medication Orders    Assessment: Left sided colitis without complications K51.50     Medication grids  Order Date Medication Dose Route Rate Rate 2 Rate 3 Rate 4 Int. of Treat.   09/30/2024 Avsola 7.5mg/kg  mL/hr    8 Weeks   Inf. Date Medication % Conc. Inf. # Therapy Type Bag # Vials Total mgs Lot # Exp. Date   07/23/2025 Avsola  47 maintenance  7 675.00 [id number] 10/31/2028   Inf. Date Medication IV Site IV Gauge Start Stop Total Time Wt. (lbs) Wt. (kgs)   07/23/2025 Avsola left forearm 24 9:25 AM 10:25 AM 0 hour(s) 0 minute(s) 198.00 89.796     Vitals Flowsheet  Time Temp Pulse Resp Sys BP Wendy BP Reaction Conc Rate   9:06 AM 97.5 89 12 100 59      9:55AM 97.7 68 12 90 60      10:25 AM 97.8 65 12 103 62        Post Infusion  The patient did tolerate the infusion.     Nursing Notes  Order date: 09/30/24  Last infusion date: 03/27/25  Oral premeds per order: N/A  Specialty Pharmacy: N  Change in health status per assessment? N  IV maintenance 0.9% NS 500ml TKO  Start time: 0910am  IV start by: Maggie Saenz RN- labs off IV start  Missed IV LH 24g-site CDI  IV and Fluid D/C time: 1040am  NS volume infused: <50mL  Site condition: CDI and patent throughout infusion, no redness/swelling at IV site  D/C instructions reviewed, Pt verbalized understanding.  Maggie Vivant, RN    Date Medication Total mg Used mg Wasted mg   07/23/2025 Avsola 700.00 675.00 25.00   05/29/2025 Avsola 700.00 666.00 34.00   03/27/2025 Avsola 700.00 700.00 0.00   01/30/2025 Avsola 700.00 700.00 0.00   12/05/2024 Avsola 700.00 700.00 0.00   10/11/2024 Avsola 700.00 700.00 0.00       Visit oversight provided by:  Inder Alexander MD 07/23/2025 09:15 AM

## 2025-07-25 ENCOUNTER — TRANSFERRED RECORDS (OUTPATIENT)
Dept: ADMINISTRATIVE | Facility: CLINIC | Age: 65
End: 2025-07-25
Payer: COMMERCIAL

## 2025-07-27 NOTE — PROCEDURES
2025        John Steven   [address] [place] [address]-      John Steven,  :  1960    I am writing to let you know the results of the tests that were done the other day.   Thank you for allowing [facility] the opportunity to take part in your healthcare.  At [facility] we strive to provide each patient with the finest gastroenterology care available.  We hope your experience was pleasant and informative.    Overall your labs look great. That one liver test is still a little high and we can discuss further at your upcoming appt. I hope all is well with you!      Creatinine 2025 09:04   Description Result Units Flags Range   Creatinine 0.94 mg/dL  0.57-1.00   eGFR 67 mL/min/1.73  >59   Comments   Performed At: DV, [facility] [address] [place], [place], [id number]  Bladimir Olmos MD, Phone: [phone]   BUN 2025 09:04   Description Result Units Flags Range   BUN 17 mg/dL  8-27   Comments   Performed At: DV, [facility] [address] [place], [place], [id number]  Bladimir Olmos MD, Phone: [phone]   Hepatic Function Panel (7) 2025 09:04   Description Result Units Flags Range   Bilirubin, Total 0.4 mg/dL  0.0-1.2   Bilirubin, Direct 0.13 mg/dL  0.00-0.40   AST (SGOT) 32 IU/L  0-40   ALT (SGPT) 21 IU/L  0-32   Albumin 4.1 g/dL  3.9-4.9   Alkaline Phosphatase 148 IU/L H    Protein, Total 7.1 g/dL  6.0-8.5   Comments   Performed At: DV, [facility] [address] [place], [place], [id number]  Bladimir Olmos MD, Phone: [phone]   CBC With Differential/Platelet 2025 09:04   Description Result Units Flags Range   Hemoglobin 15.0 g/dL  11.1-15.9   Hematocrit 44.6 %  34.0-46.6    fL H 79-97   MCHC 33.6 g/dL  31.5-35.7   MCH 33.8 pg H 26.6-33.0   RDW 13.4 %  11.7-15.4   Platelets 242 x10E3/uL  150-450   Neutrophils 40 %  Not Estab.   Lymphs 40 %  Not Estab.   Monocytes 11 %  Not Estab.   Eos 7 %  Not Estab.   Basos 1 %  Not Estab.   Neutrophils (Absolute) 2.7  x10E3/uL  1.4-7.0   Lymphs (Absolute) 2.6 x10E3/uL  0.7-3.1   Monocytes(Absolute) 0.7 x10E3/uL  0.1-0.9   Eos (Absolute) 0.5 x10E3/uL H 0.0-0.4   Baso (Absolute) 0.0 x10E3/uL  0.0-0.2   Immature Grans (Abs) 0.0 x10E3/uL  0.0-0.1   Immature Granulocytes 1 %  Not Estab.   RBC 4.44 x10E6/uL  3.77-5.28   WBC 6.6 x10E3/uL  3.4-10.8   Comments   First Available              Performed At: , [facility] [address], [place], [place], [id number]  Bladimir Olmos MD, Phone: [phone]           Thank you.    Electronically signed by:  Vonda COLLINS 07/25/2025 09:07 PM  Document generated by:  Vonda COLLINS  07/25/2025  If your provider ordered multiple tests; the results may not become available at the same time.  If multiple test results are received within 14 days of one another, you may receive a duplicate.  cc:  Chantel Tolliver DO

## 2025-08-11 ENCOUNTER — HOSPITAL ENCOUNTER (OUTPATIENT)
Dept: MAMMOGRAPHY | Facility: CLINIC | Age: 65
Discharge: HOME | End: 2025-08-11
Attending: INTERNAL MEDICINE | Admitting: INTERNAL MEDICINE
Payer: COMMERCIAL

## 2025-08-11 DIAGNOSIS — Z12.31 VISIT FOR SCREENING MAMMOGRAM: ICD-10-CM

## 2025-08-11 PROCEDURE — 77067 SCR MAMMO BI INCL CAD: CPT

## 2025-08-24 ENCOUNTER — PATIENT OUTREACH (OUTPATIENT)
Dept: CARE COORDINATION | Facility: CLINIC | Age: 65
End: 2025-08-24
Payer: COMMERCIAL

## (undated) DEVICE — PANTIES MESH LG/XLG 2PK 706M2

## (undated) DEVICE — KIT SURGICAL TURNOVER FVSD-01D

## (undated) DEVICE — SUCTION IRR STRYKERFLOW II W/TIP 250-070-520

## (undated) DEVICE — SEAL SET MYOSURE ROD LENS SCOPE SINGLE USE 40-902

## (undated) DEVICE — NDL INSUFFLATION 120MM VERRES 172015

## (undated) DEVICE — RETR ELEV / UTERINE MANIPULATOR V-CARE MED CUP 60-6085-201

## (undated) DEVICE — SURGICEL POWDER ABSORBABLE HEMOSTAT 3GM 3013SP

## (undated) DEVICE — SOL WATER IRRIG 3000ML BAG 2B7117

## (undated) DEVICE — EVAC SYSTEM CLEAR FLOW SC082500

## (undated) DEVICE — SOL WATER IRRIG 1000ML BOTTLE 2F7114

## (undated) DEVICE — CATH TRAY FOLEY SURESTEP 16FR W/URINE MTR STATLK LF A303416A

## (undated) DEVICE — ESU HOLSTER PLASTIC DISP E2400

## (undated) DEVICE — GLOVE PROTEXIS MICRO 7.0  2D73PM70

## (undated) DEVICE — SUCTION CANISTER MEDIVAC LINER 3000ML W/LID 65651-530

## (undated) DEVICE — SOL NACL 0.9% IRRIG 1000ML BOTTLE 2F7124

## (undated) DEVICE — PACK TVT HYSTEROSCOPY SMA15HYFSE

## (undated) DEVICE — ESU FCP OLYMPUS PK CUTTING 5MMX33CM PK-CF0533

## (undated) DEVICE — GLOVE PROTEXIS MICRO 6.5  2D73PM65

## (undated) DEVICE — GLOVE PROTEXIS BLUE W/NEU-THERA 6.5  2D73EB65

## (undated) DEVICE — BARRIER INTERCEED 3X4" 4350

## (undated) DEVICE — Device

## (undated) DEVICE — SUCTION CANISTER BEMIS HI FLOW 006772-901

## (undated) DEVICE — ENDO DISSECTOR BLUNT 05MM  BTD05

## (undated) DEVICE — GLOVE PROTEXIS BLUE W/NEU-THERA 6.0  2D73EB60

## (undated) DEVICE — ESU HANDPIECE BIPOLAR THUNDERBEAT FC 5MMX35CM TB-0535FC

## (undated) DEVICE — SOL NACL 0.9% IRRIG 3000ML BAG 2B7477

## (undated) DEVICE — ENDO SCOPE WARMER LF TM500

## (undated) DEVICE — ESU LIGASURE MARYLAND LAPAROSCOPIC SLR/DVDR 5MMX37CM LF1937

## (undated) DEVICE — ENDO APPLICATOR SURGIFLO PLASMA COBLATION MS1995

## (undated) DEVICE — ENDO TROCAR FIRST ENTRY KII FIOS Z-THRD 12X100MM CTF73

## (undated) DEVICE — ENDO TROCAR FIRST ENTRY KII FIOS Z-THRD 05X100MM CTF03

## (undated) DEVICE — GOWN XLG DISP 9545

## (undated) DEVICE — ESU HOLDER LAP INST DISP PURPLE LONG 330MM H-PRO-330

## (undated) DEVICE — ENDO TROCAR SLEEVE KII ADV FIXATION 05X100MM CFS02

## (undated) DEVICE — SU PDS II 0 CTX 60" Z990G

## (undated) DEVICE — WIPES FOLEY CARE SURESTEP PROVON DFC100

## (undated) DEVICE — SU VICRYL 0 UR-6 27" J603H

## (undated) DEVICE — LINEN TOWEL PACK X5 5464

## (undated) DEVICE — ADH SKIN CLOSURE PREMIERPRO EXOFIN 1.0ML 3470

## (undated) DEVICE — TUBING C02 INSUFFLATION LAP FILTER HEATER 6198

## (undated) DEVICE — PREP DURAPREP 26ML APL 8630

## (undated) RX ORDER — APREPITANT 40 MG/1
CAPSULE ORAL
Status: DISPENSED
Start: 2019-05-13

## (undated) RX ORDER — LIDOCAINE HYDROCHLORIDE 20 MG/ML
INJECTION, SOLUTION EPIDURAL; INFILTRATION; INTRACAUDAL; PERINEURAL
Status: DISPENSED
Start: 2019-05-13

## (undated) RX ORDER — BUPIVACAINE HYDROCHLORIDE 5 MG/ML
INJECTION, SOLUTION EPIDURAL; INTRACAUDAL
Status: DISPENSED
Start: 2019-09-09

## (undated) RX ORDER — KETOROLAC TROMETHAMINE 30 MG/ML
INJECTION, SOLUTION INTRAMUSCULAR; INTRAVENOUS
Status: DISPENSED
Start: 2019-09-09

## (undated) RX ORDER — PROPOFOL 10 MG/ML
INJECTION, EMULSION INTRAVENOUS
Status: DISPENSED
Start: 2019-05-13

## (undated) RX ORDER — GLYCOPYRROLATE 0.2 MG/ML
INJECTION, SOLUTION INTRAMUSCULAR; INTRAVENOUS
Status: DISPENSED
Start: 2019-09-09

## (undated) RX ORDER — HYDROMORPHONE HYDROCHLORIDE 1 MG/ML
INJECTION, SOLUTION INTRAMUSCULAR; INTRAVENOUS; SUBCUTANEOUS
Status: DISPENSED
Start: 2019-09-09

## (undated) RX ORDER — PROPOFOL 10 MG/ML
INJECTION, EMULSION INTRAVENOUS
Status: DISPENSED
Start: 2019-09-09

## (undated) RX ORDER — LIDOCAINE HYDROCHLORIDE 20 MG/ML
INJECTION, SOLUTION EPIDURAL; INFILTRATION; INTRACAUDAL; PERINEURAL
Status: DISPENSED
Start: 2019-09-09

## (undated) RX ORDER — HYDROXYZINE HYDROCHLORIDE 25 MG/1
TABLET, FILM COATED ORAL
Status: DISPENSED
Start: 2019-05-13

## (undated) RX ORDER — ALBUTEROL SULFATE 90 UG/1
AEROSOL, METERED RESPIRATORY (INHALATION)
Status: DISPENSED
Start: 2019-05-13

## (undated) RX ORDER — EPINEPHRINE 1 MG/ML
INJECTION, SOLUTION INTRAMUSCULAR; SUBCUTANEOUS
Status: DISPENSED
Start: 2019-09-09

## (undated) RX ORDER — DEXAMETHASONE SODIUM PHOSPHATE 4 MG/ML
INJECTION, SOLUTION INTRA-ARTICULAR; INTRALESIONAL; INTRAMUSCULAR; INTRAVENOUS; SOFT TISSUE
Status: DISPENSED
Start: 2019-09-09

## (undated) RX ORDER — CEFAZOLIN SODIUM 1 G/3ML
INJECTION, POWDER, FOR SOLUTION INTRAMUSCULAR; INTRAVENOUS
Status: DISPENSED
Start: 2019-09-09

## (undated) RX ORDER — ONDANSETRON 2 MG/ML
INJECTION INTRAMUSCULAR; INTRAVENOUS
Status: DISPENSED
Start: 2019-05-13

## (undated) RX ORDER — ONDANSETRON 2 MG/ML
INJECTION INTRAMUSCULAR; INTRAVENOUS
Status: DISPENSED
Start: 2019-09-09

## (undated) RX ORDER — APREPITANT 40 MG/1
CAPSULE ORAL
Status: DISPENSED
Start: 2019-09-09

## (undated) RX ORDER — FENTANYL CITRATE 50 UG/ML
INJECTION, SOLUTION INTRAMUSCULAR; INTRAVENOUS
Status: DISPENSED
Start: 2019-05-13

## (undated) RX ORDER — DEXAMETHASONE SODIUM PHOSPHATE 4 MG/ML
INJECTION, SOLUTION INTRA-ARTICULAR; INTRALESIONAL; INTRAMUSCULAR; INTRAVENOUS; SOFT TISSUE
Status: DISPENSED
Start: 2019-05-13

## (undated) RX ORDER — ACETAMINOPHEN 500 MG
TABLET ORAL
Status: DISPENSED
Start: 2019-05-13

## (undated) RX ORDER — CEFAZOLIN SODIUM 2 G/100ML
INJECTION, SOLUTION INTRAVENOUS
Status: DISPENSED
Start: 2019-09-09

## (undated) RX ORDER — ACETAMINOPHEN 500 MG
TABLET ORAL
Status: DISPENSED
Start: 2019-09-09

## (undated) RX ORDER — HYDROXYZINE HYDROCHLORIDE 25 MG/1
TABLET, FILM COATED ORAL
Status: DISPENSED
Start: 2019-09-09